# Patient Record
Sex: MALE | Race: WHITE | NOT HISPANIC OR LATINO | Employment: OTHER | URBAN - METROPOLITAN AREA
[De-identification: names, ages, dates, MRNs, and addresses within clinical notes are randomized per-mention and may not be internally consistent; named-entity substitution may affect disease eponyms.]

---

## 2018-09-20 ENCOUNTER — OFFICE VISIT (OUTPATIENT)
Dept: ORTHOPEDICS | Facility: CLINIC | Age: 63
End: 2018-09-20
Payer: COMMERCIAL

## 2018-09-20 VITALS
DIASTOLIC BLOOD PRESSURE: 100 MMHG | BODY MASS INDEX: 30.22 KG/M2 | WEIGHT: 228 LBS | HEIGHT: 73 IN | SYSTOLIC BLOOD PRESSURE: 160 MMHG

## 2018-09-20 DIAGNOSIS — M16.11 PRIMARY OSTEOARTHRITIS OF RIGHT HIP: ICD-10-CM

## 2018-09-20 DIAGNOSIS — M16.12 PRIMARY OSTEOARTHRITIS OF LEFT HIP: ICD-10-CM

## 2018-09-20 DIAGNOSIS — M54.16 LUMBAR RADICULITIS: Primary | ICD-10-CM

## 2018-09-20 PROCEDURE — 3008F BODY MASS INDEX DOCD: CPT | Mod: ,,, | Performed by: ORTHOPAEDIC SURGERY

## 2018-09-20 PROCEDURE — 72100 X-RAY EXAM L-S SPINE 2/3 VWS: CPT | Mod: ,,, | Performed by: ORTHOPAEDIC SURGERY

## 2018-09-20 PROCEDURE — 99203 OFFICE O/P NEW LOW 30 MIN: CPT | Mod: 57,25,, | Performed by: ORTHOPAEDIC SURGERY

## 2018-09-20 PROCEDURE — 72170 X-RAY EXAM OF PELVIS: CPT | Mod: ,,, | Performed by: ORTHOPAEDIC SURGERY

## 2018-09-20 RX ORDER — FUROSEMIDE 20 MG/1
20 TABLET ORAL 2 TIMES DAILY PRN
COMMUNITY
End: 2021-01-11 | Stop reason: SDUPTHER

## 2018-09-20 RX ORDER — METOPROLOL SUCCINATE 25 MG/1
50 TABLET, EXTENDED RELEASE ORAL DAILY
COMMUNITY
End: 2020-06-24

## 2018-09-20 NOTE — PROGRESS NOTES
[facility]    Subjective:     Chief Complaint:   Chief Complaint   Patient presents with    Lumbar Spine - Pain     Lumbar pain x 2 years radiates into both hips/ groin. He has left foot numbness. Limited sitting, standing and walking. NO bowel or bladder problems       Past Medical History:   Diagnosis Date    A-fib     Hypertension        Past Surgical History:   Procedure Laterality Date    CARDIAC SURGERY         Current Outpatient Medications   Medication Sig    apixaban (ELIQUIS) 5 mg (74 tabs) DsPk Take by mouth. For the first 7 days take two 5 mg tablets twice daily.  After 7 days take one 5 mg tablet twice daily.    furosemide (LASIX) 20 MG tablet Take 20 mg by mouth 2 (two) times daily.    losartan potassium (LOSARTAN ORAL) Take by mouth.    metoprolol succinate (TOPROL-XL) 25 MG 24 hr tablet Take 25 mg by mouth once daily.    spironolactone (CAROSPIR) 25 mg/5 mL Susp oral susp Take 37.5 mg by mouth once daily.     No current facility-administered medications for this visit.        Review of patient's allergies indicates:   Allergen Reactions    Lisinopril        History reviewed. No pertinent family history.    Social History     Socioeconomic History    Marital status:      Spouse name: Not on file    Number of children: Not on file    Years of education: Not on file    Highest education level: Not on file   Social Needs    Financial resource strain: Not on file    Food insecurity - worry: Not on file    Food insecurity - inability: Not on file    Transportation needs - medical: Not on file    Transportation needs - non-medical: Not on file   Occupational History    Not on file   Tobacco Use    Smoking status: Current Every Day Smoker   Substance and Sexual Activity    Alcohol use: Not on file    Drug use: Not on file    Sexual activity: Not on file   Other Topics Concern    Not on file   Social History Narrative    Not on file       History of present  illness: Patient comes in today for chief complaint of bilateral severe groin pain. The right is much worse than the left. He also has low back pain. This been going on for many years. He's got poor is having difficulty ambulating.      Review of Systems:    Constitution: Negative for chills, fever, and sweats.  Negative for unexplained weight loss.    HENT:  Negative for headaches and blurry vision.    Cardiovascular:Negative for chest pain or irregular heart beat. Negative for hypertension.    Respiratory:  Negative for cough and shortness of breath.    Gastrointestinal: Negative for abdominal pain, heartburn, melena, nausea, and vomitting.    Genitourinary:  Negative bladder incontinence and dysuria.    Musculoskeletal:  See HPI for details.     Neurological: Negative for numbness.    Psychiatric/Behavioral: Negative for depression.  The patient is not nervous/anxious.      Endocrine: Negative for polyuria    Hematologic/Lymphatic: Negative for bleeding problem.  Does not bruise/bleed easily.    Skin: Negative for poor would healing and rash    Objective:      Physical Examination:    Vital Signs:    Vitals:    09/20/18 1629   BP: (!) 160/100       Body mass index is 30.08 kg/m².    This a well-developed, well nourished patient in no acute distress.  They are alert and oriented and cooperative to examination.        Patient has full range of motion of his lumbar spine. His pain with flexion extension of the lumbar spine. Negative straight leg raises. EHLs are intact deep tendon reflexes are intact. He has a 10° hip flexion contracture on the right. He has severe pain with internal/external rotation of the right hip. His pain with external and internal rotation of the left hip. No flexion contracture on the left side.  Pertinent New Results:    XRAY Report / Interpretation:   AP of the pelvis demonstrates bone-on-bone osteophyte is of the right hip and severe osteophyte arthritis of the left hip.    And lateral of  the lumbar spine demonstrates moderate general changes of the lower lumbar segments.    Assessment/Plan:      Severe bone-on-bone osteophyte arthritis of the right hip. I've offered him a right total hip arthroplasty. Risks and benefits including dislocation leg length discrepancy RSD DVT another complications are discussed with him in great detail. He understood and wished to proceed      This note was created using Dragon voice recognition software that occasionally misinterpreted phrases or words.

## 2018-09-24 ENCOUNTER — TELEPHONE (OUTPATIENT)
Dept: ORTHOPEDICS | Facility: CLINIC | Age: 63
End: 2018-09-24

## 2018-09-24 NOTE — TELEPHONE ENCOUNTER
----- Message from Joan Gatica sent at 9/24/2018  3:58 PM CDT -----  Contact: PATIENT  PLEASE CALL ABOUT PATIENT ABOUT THE PROCEDURE DR CHÁVEZ TOLD HIM ABOUT

## 2018-09-24 NOTE — TELEPHONE ENCOUNTER
Pt would like to see about having an anterior LILIBETH. Will speak with Dr. Perdue and get back with him

## 2018-09-26 NOTE — TELEPHONE ENCOUNTER
Spoke with Dr. Perdue and he does not do an anterior approach but offered for the pt to come back in and see him. Adrianne't made

## 2018-10-02 ENCOUNTER — OFFICE VISIT (OUTPATIENT)
Dept: ORTHOPEDICS | Facility: CLINIC | Age: 63
End: 2018-10-02
Payer: COMMERCIAL

## 2018-10-02 VITALS
DIASTOLIC BLOOD PRESSURE: 98 MMHG | HEART RATE: 75 BPM | WEIGHT: 228 LBS | SYSTOLIC BLOOD PRESSURE: 148 MMHG | HEIGHT: 73 IN | BODY MASS INDEX: 30.22 KG/M2

## 2018-10-02 DIAGNOSIS — M16.11 PRIMARY OSTEOARTHRITIS OF RIGHT HIP: Primary | ICD-10-CM

## 2018-10-02 PROCEDURE — 3008F BODY MASS INDEX DOCD: CPT | Mod: ,,, | Performed by: ORTHOPAEDIC SURGERY

## 2018-10-02 PROCEDURE — 99499 UNLISTED E&M SERVICE: CPT | Mod: ,,, | Performed by: ORTHOPAEDIC SURGERY

## 2018-10-02 NOTE — PROGRESS NOTES
[facility]    Subjective:     Chief Complaint:   Chief Complaint   Patient presents with    Right Hip - Pain     Right hip pain x years. States that he would like to talk about setting up surgery today for the right hip.        Past Medical History:   Diagnosis Date    A-fib     Hypertension        Past Surgical History:   Procedure Laterality Date    CARDIAC SURGERY      KNEE SURGERY         Current Outpatient Medications   Medication Sig    apixaban (ELIQUIS) 5 mg (74 tabs) DsPk Take by mouth. For the first 7 days take two 5 mg tablets twice daily.  After 7 days take one 5 mg tablet twice daily.    furosemide (LASIX) 20 MG tablet Take 20 mg by mouth 2 (two) times daily.    losartan potassium (LOSARTAN ORAL) Take by mouth.    metoprolol succinate (TOPROL-XL) 25 MG 24 hr tablet Take 25 mg by mouth once daily.    spironolactone (CAROSPIR) 25 mg/5 mL Susp oral susp Take 37.5 mg by mouth once daily.     No current facility-administered medications for this visit.        Review of patient's allergies indicates:   Allergen Reactions    Lisinopril        History reviewed. No pertinent family history.    Social History     Socioeconomic History    Marital status:      Spouse name: Not on file    Number of children: Not on file    Years of education: Not on file    Highest education level: Not on file   Social Needs    Financial resource strain: Not on file    Food insecurity - worry: Not on file    Food insecurity - inability: Not on file    Transportation needs - medical: Not on file    Transportation needs - non-medical: Not on file   Occupational History    Not on file   Tobacco Use    Smoking status: Current Every Day Smoker   Substance and Sexual Activity    Alcohol use: Not on file    Drug use: Not on file    Sexual activity: Not on file   Other Topics Concern    Not on file   Social History Narrative    Not on file       History of present illness: Patient comes in today  with a chief complaint of right hip pain. He really comes in today for his upcoming total hip.      Review of Systems:    Constitution: Negative for chills, fever, and sweats.  Negative for unexplained weight loss.    HENT:  Negative for headaches and blurry vision.    Cardiovascular:Negative for chest pain or irregular heart beat. Negative for hypertension.    Respiratory:  Negative for cough and shortness of breath.    Gastrointestinal: Negative for abdominal pain, heartburn, melena, nausea, and vomitting.    Genitourinary:  Negative bladder incontinence and dysuria.    Musculoskeletal:  See HPI for details.     Neurological: Negative for numbness.    Psychiatric/Behavioral: Negative for depression.  The patient is not nervous/anxious.      Endocrine: Negative for polyuria    Hematologic/Lymphatic: Negative for bleeding problem.  Does not bruise/bleed easily.    Skin: Negative for poor would healing and rash    Objective:      Physical Examination:    Vital Signs:    Vitals:    10/02/18 1619   BP: (!) 148/98   Pulse: 75       Body mass index is 30.08 kg/m².    This a well-developed, well nourished patient in no acute distress.  They are alert and oriented and cooperative to examination.        Patient has limited range of motion of his right hip. No change in his exam from prior visit.  Pertinent New Results:    XRAY Report / Interpretation:   No new XRAYS Today.    Assessment/Plan:      Right hip bone-on-bone osteophyte arthritis. I answered all his questions. We spoke about the procedure in extensive detail. He will follow-up for the surgery      This note was created using Dragon voice recognition software that occasionally misinterpreted phrases or words.

## 2018-10-05 ENCOUNTER — OFFICE VISIT (OUTPATIENT)
Dept: ORTHOPEDICS | Facility: CLINIC | Age: 63
End: 2018-10-05
Payer: COMMERCIAL

## 2018-10-05 VITALS — HEIGHT: 73 IN | WEIGHT: 228 LBS | BODY MASS INDEX: 30.22 KG/M2

## 2018-10-05 DIAGNOSIS — M16.11 PRIMARY OSTEOARTHRITIS OF RIGHT HIP: Primary | ICD-10-CM

## 2018-10-05 PROCEDURE — 99213 OFFICE O/P EST LOW 20 MIN: CPT | Mod: ,,, | Performed by: ORTHOPAEDIC SURGERY

## 2018-10-05 PROCEDURE — 3008F BODY MASS INDEX DOCD: CPT | Mod: ,,, | Performed by: ORTHOPAEDIC SURGERY

## 2018-10-05 PROCEDURE — 73502 X-RAY EXAM HIP UNI 2-3 VIEWS: CPT | Mod: FY,RT,, | Performed by: ORTHOPAEDIC SURGERY

## 2018-10-05 NOTE — PROGRESS NOTES
Subjective:       Chief Complaint    Chief Complaint   Patient presents with    Hip Pain     Pt states that he has had hip pain x 2-3 years. He states that over time it is getting progressively worse. He describes his pain as a constant heavy aching pain. He states that his pain depends on his activity. He takes Tylenol for pain with some relief.       THOMAS Andrea is a 63 y.o.  male who presents painful right hip. He is requesting anterior hip surgery.      Past History  Past Medical History:   Diagnosis Date    A-fib     Hypertension      Past Surgical History:   Procedure Laterality Date    CARDIAC SURGERY      KNEE SURGERY       Social History     Socioeconomic History    Marital status:      Spouse name: Not on file    Number of children: Not on file    Years of education: Not on file    Highest education level: Not on file   Social Needs    Financial resource strain: Not on file    Food insecurity - worry: Not on file    Food insecurity - inability: Not on file    Transportation needs - medical: Not on file    Transportation needs - non-medical: Not on file   Occupational History    Not on file   Tobacco Use    Smoking status: Current Every Day Smoker   Substance and Sexual Activity    Alcohol use: Not on file    Drug use: Not on file    Sexual activity: Not on file   Other Topics Concern    Not on file   Social History Narrative    Not on file         Medications  Current Outpatient Medications   Medication Sig    apixaban (ELIQUIS) 5 mg (74 tabs) DsPk Take by mouth. For the first 7 days take two 5 mg tablets twice daily.  After 7 days take one 5 mg tablet twice daily.    furosemide (LASIX) 20 MG tablet Take 20 mg by mouth 2 (two) times daily.    losartan potassium (LOSARTAN ORAL) Take by mouth.    metoprolol succinate (TOPROL-XL) 25 MG 24 hr tablet Take 25 mg by mouth once daily.    spironolactone (CAROSPIR) 25 mg/5 mL Susp oral susp Take 37.5 mg by mouth once  daily.     No current facility-administered medications for this visit.        Allergies  Review of patient's allergies indicates:   Allergen Reactions    Lisinopril          Review of Systems     Constitutional: Negative    HENT: Negative  Eyes: Negative  Respiratory: Negative  Cardiovascular: Negative  Musculoskeletal: HPI  Skin: Negative  Neurological: Negative  Hematological: Negative  Endocrine: Negative      Physical Exam    There were no vitals filed for this visit.  Physical Examination: Well-developed, well-nourished white male, alert, cooperative. Tender over the anterior aspect of the right hip. Abduction, 30° with pain, flexion 90° with pain. There is total of 30°, rotation with pain. Is an external rotation contracture present 20°.     Skin-clear  General appearance -  well appearing, and in no distress  Mental status - awake  Neck - supple  Chest -  symmetric air entry  Heart - normal rate   Abdomen - soft      Assessment/Plan   Primary osteoarthritis of right hip    He is scheduled for October 22 on Monday. His preop October 17. He is currently on eliquis, which will have to be stopped preop day      This note was dictated using voice recognition software and may contain grammatical errors.

## 2018-10-08 DIAGNOSIS — M16.11 OSTEOARTHRITIS OF RIGHT HIP: Primary | ICD-10-CM

## 2018-10-11 ENCOUNTER — TELEPHONE (OUTPATIENT)
Dept: ORTHOPEDICS | Facility: CLINIC | Age: 63
End: 2018-10-11

## 2018-10-11 NOTE — TELEPHONE ENCOUNTER
----- Message from Erika Velasquez sent at 10/11/2018 10:07 AM CDT -----  Please does not want to schedule hip surgery with Dr. Perdue.  He is going to a different doctor.  Call him if you have any questions.

## 2018-10-17 ENCOUNTER — OFFICE VISIT (OUTPATIENT)
Dept: ORTHOPEDICS | Facility: CLINIC | Age: 63
End: 2018-10-17
Payer: COMMERCIAL

## 2018-10-17 VITALS
DIASTOLIC BLOOD PRESSURE: 84 MMHG | HEIGHT: 73 IN | SYSTOLIC BLOOD PRESSURE: 136 MMHG | WEIGHT: 228 LBS | BODY MASS INDEX: 30.22 KG/M2 | HEART RATE: 89 BPM

## 2018-10-17 DIAGNOSIS — M16.11 PRIMARY OSTEOARTHRITIS OF RIGHT HIP: Primary | ICD-10-CM

## 2018-10-17 LAB
ALBUMIN SERPL-MCNC: 4.2 G/DL (ref 3.1–4.7)
ALP SERPL-CCNC: 75 IU/L (ref 40–104)
ALT (SGPT): 19 IU/L (ref 3–33)
AST SERPL-CCNC: 24 IU/L (ref 10–40)
BILIRUB SERPL-MCNC: 0.7 MG/DL (ref 0.3–1)
BUN SERPL-MCNC: 13 MG/DL (ref 8–20)
CALCIUM SERPL-MCNC: 8.9 MG/DL (ref 7.7–10.4)
CHLORIDE: 105 MMOL/L (ref 98–110)
CO2 SERPL-SCNC: 27.2 MMOL/L (ref 22.8–31.6)
CREATININE: 1.06 MG/DL (ref 0.6–1.4)
GLUCOSE: 98 MG/DL (ref 70–99)
HCT VFR BLD AUTO: 44.3 % (ref 39–55)
HGB BLD-MCNC: 15 G/DL (ref 14–16)
MCH RBC QN AUTO: 32.3 PG (ref 25–35)
MCHC RBC AUTO-ENTMCNC: 33.9 G/DL (ref 31–36)
MCV RBC AUTO: 95.3 FL (ref 80–100)
MRSA SCREEN BY PCR: NORMAL
NUCLEATED RBCS: 0 %
PLATELET # BLD AUTO: 151 K/UL (ref 140–440)
POTASSIUM SERPL-SCNC: 4.2 MMOL/L (ref 3.5–5)
PROT SERPL-MCNC: 7.1 G/DL (ref 6–8.2)
RBC # BLD AUTO: 4.65 M/UL (ref 4.3–5.9)
SODIUM: 138 MMOL/L (ref 134–144)
WBC # BLD AUTO: 8.5 K/UL (ref 5–10)

## 2018-10-17 PROCEDURE — 99499 UNLISTED E&M SERVICE: CPT | Mod: ,,, | Performed by: ORTHOPAEDIC SURGERY

## 2018-10-17 NOTE — PROGRESS NOTES
Subjective:       Chief Complaint    Chief Complaint   Patient presents with    Pre-op Exam     Patient is scheduled for a right total hip arthroplasty on 10/22/18 w/ Dr. Cosme.  Chronic hip pain since approx. 2015.  Stopped Eliquis as of today.  Dr. Oliver advised him to start Lovenox in place of the Eliquis.       THOMAS Andrea is a 63 y.o.  male who presents preop total hip arthroplasty on the right. He has stopped his eliquis. He does have a history of a mini stroke and has atrial fibrillation.      Past History  Past Medical History:   Diagnosis Date    A-fib     A-fib     Hypertension      Past Surgical History:   Procedure Laterality Date    CARDIAC SURGERY      Correct abnormal heart rhythm    KNEE SURGERY       Social History     Socioeconomic History    Marital status:      Spouse name: Not on file    Number of children: Not on file    Years of education: Not on file    Highest education level: Not on file   Social Needs    Financial resource strain: Not on file    Food insecurity - worry: Not on file    Food insecurity - inability: Not on file    Transportation needs - medical: Not on file    Transportation needs - non-medical: Not on file   Occupational History    Not on file   Tobacco Use    Smoking status: Current Every Day Smoker    Smokeless tobacco: Never Used   Substance and Sexual Activity    Alcohol use: Not on file    Drug use: Not on file    Sexual activity: Not on file   Other Topics Concern    Not on file   Social History Narrative    Not on file         Medications  Current Outpatient Medications   Medication Sig    furosemide (LASIX) 20 MG tablet Take 20 mg by mouth 2 (two) times daily.    losartan potassium (LOSARTAN ORAL) Take by mouth.    metoprolol succinate (TOPROL-XL) 25 MG 24 hr tablet Take 25 mg by mouth 2 (two) times daily.     SPIRONOLACTONE ORAL Take 37.5 mg by mouth once daily.    apixaban (ELIQUIS) 5 mg (74 tabs) DsPk Take by  mouth. For the first 7 days take two 5 mg tablets twice daily.  After 7 days take one 5 mg tablet twice daily.     No current facility-administered medications for this visit.        Allergies  Review of patient's allergies indicates:   Allergen Reactions    Lisinopril          Review of Systems     Constitutional: Negative    HENT: Negative  Eyes: Negative  Respiratory: Negative  Cardiovascular: Negative  Musculoskeletal: HPI  Skin: Negative  Neurological: Negative  Hematological: Negative  Endocrine: Negative      Physical Exam    Vitals:    10/17/18 0852   BP: 136/84   Pulse: 89     Physical Examination: Right hip examination reveals tenderness over the front of the right hip. Abduction, 30° flexion 90°, there is a 30° external rotation contracture with no internal rotation. Neurovascular intact. Some venous insufficiency changes noted in the around the ankle and leg area     Skin-clear  General appearance -  well appearing, and in no distress  Mental status - awake  Neck - supple  Chest -  symmetric air entry  Heart - normal rate   Abdomen - soft      Assessment/Plan   Primary osteoarthritis of right hip      All questions answered. Patient is scheduled for October 22. He will undergo anterior approach total hip arthroplasty. His postoperative pain pill of choice is Percocet.      This note was dictated using voice recognition software and may contain grammatical errors.

## 2018-10-25 ENCOUNTER — TELEPHONE (OUTPATIENT)
Dept: ORTHOPEDICS | Facility: CLINIC | Age: 63
End: 2018-10-25

## 2018-10-25 NOTE — TELEPHONE ENCOUNTER
Spoke to Romelia with [facility] @ [phone], new auth created and approved. Auth # [id number] for DOS: 10/29/2018  Notified Lisa with [facility] insurance.

## 2018-10-26 DIAGNOSIS — M16.11 PRIMARY OSTEOARTHRITIS OF RIGHT HIP: Primary | ICD-10-CM

## 2018-10-26 DIAGNOSIS — Z01.818 PRE-OP TESTING: ICD-10-CM

## 2018-10-26 LAB
BASOPHILS NFR BLD: 0.1 K/UL (ref 0–0.2)
BASOPHILS NFR BLD: 0.8 %
EOSINOPHIL NFR BLD: 0.3 K/UL (ref 0–0.7)
EOSINOPHIL NFR BLD: 5.3 %
ERYTHROCYTE [DISTWIDTH] IN BLOOD BY AUTOMATED COUNT: 12.3 % (ref 11.7–14.9)
GRAN #: 3.2 K/UL (ref 1.4–6.5)
GRAN%: 53.6 %
HCT VFR BLD AUTO: 46.1 % (ref 39–55)
HGB BLD-MCNC: 15.5 G/DL (ref 14–16)
IMMATURE GRANS (ABS): 0 K/UL (ref 0–1)
IMMATURE GRANULOCYTES: 0.5 %
LYMPH #: 1.7 K/UL (ref 1.2–3.4)
LYMPH%: 28.8 %
MCH RBC QN AUTO: 32.4 PG (ref 25–35)
MCHC RBC AUTO-ENTMCNC: 33.6 G/DL (ref 31–36)
MCV RBC AUTO: 96.4 FL (ref 80–100)
MONO #: 0.7 K/UL (ref 0.1–0.6)
MONO%: 11 %
NUCLEATED RBCS: 0 %
PLATELET # BLD AUTO: 126 K/UL (ref 140–440)
PMV BLD AUTO: 12 FL (ref 8.8–12.7)
RBC # BLD AUTO: 4.78 M/UL (ref 4.3–5.9)
WBC # BLD AUTO: 6 K/UL (ref 5–10)

## 2018-10-29 LAB
HCT VFR BLD AUTO: 28.4 % (ref 39–55)
HGB BLD-MCNC: 9.7 G/DL (ref 14–16)

## 2018-10-30 LAB
HCT VFR BLD AUTO: 25.9 % (ref 39–55)
HGB BLD-MCNC: 8.7 G/DL (ref 14–16)
MCH RBC QN AUTO: 32.8 PG (ref 25–35)
MCHC RBC AUTO-ENTMCNC: 33.6 G/DL (ref 31–36)
MCV RBC AUTO: 97.7 FL (ref 80–100)
NUCLEATED RBCS: 0 %
PLATELET # BLD AUTO: 124 K/UL (ref 140–440)
RBC # BLD AUTO: 2.65 M/UL (ref 4.3–5.9)
WBC # BLD AUTO: 10.5 K/UL (ref 5–10)

## 2018-11-02 DIAGNOSIS — Z96.641 STATUS POST TOTAL HIP REPLACEMENT, RIGHT: Primary | ICD-10-CM

## 2018-11-02 DIAGNOSIS — M16.11 PRIMARY OSTEOARTHRITIS OF RIGHT HIP: ICD-10-CM

## 2018-11-02 RX ORDER — OXYCODONE AND ACETAMINOPHEN 7.5; 325 MG/1; MG/1
1 TABLET ORAL EVERY 6 HOURS PRN
Qty: 40 TABLET | Refills: 0 | Status: SHIPPED | OUTPATIENT
Start: 2018-11-02 | End: 2018-12-13

## 2018-11-02 NOTE — TELEPHONE ENCOUNTER
Patient was discharged without a RX for pain medication. Home health nurse Viviane states he is unable to take Norco due to headaches so a RX for Percocet is being given. Patient notified to have someone pick it up from the office for him.

## 2018-11-20 ENCOUNTER — OFFICE VISIT (OUTPATIENT)
Dept: ORTHOPEDICS | Facility: CLINIC | Age: 63
End: 2018-11-20
Payer: COMMERCIAL

## 2018-11-20 VITALS — BODY MASS INDEX: 30.22 KG/M2 | WEIGHT: 228 LBS | HEIGHT: 73 IN

## 2018-11-20 DIAGNOSIS — Z98.890 POSTOPERATIVE STATE: ICD-10-CM

## 2018-11-20 DIAGNOSIS — M16.11 PRIMARY OSTEOARTHRITIS OF RIGHT HIP: Primary | ICD-10-CM

## 2018-11-20 DIAGNOSIS — Z96.641 HISTORY OF TOTAL RIGHT HIP REPLACEMENT: ICD-10-CM

## 2018-11-20 PROCEDURE — 99024 POSTOP FOLLOW-UP VISIT: CPT | Mod: ,,, | Performed by: ORTHOPAEDIC SURGERY

## 2018-11-26 DIAGNOSIS — M16.11 PRIMARY OSTEOARTHRITIS OF RIGHT HIP: Primary | ICD-10-CM

## 2018-11-26 DIAGNOSIS — Z98.890 POSTOPERATIVE STATE: ICD-10-CM

## 2018-11-26 DIAGNOSIS — Z96.641 HISTORY OF TOTAL RIGHT HIP REPLACEMENT: ICD-10-CM

## 2018-12-06 ENCOUNTER — TELEPHONE (OUTPATIENT)
Dept: ORTHOPEDICS | Facility: CLINIC | Age: 63
End: 2018-12-06

## 2018-12-06 NOTE — TELEPHONE ENCOUNTER
Informed patient that Dr. Cosme is no longer with the [facility].  Advised him/her that Rhonda Gabriel, [facility], and [facility] have agreed to assume care of Dr. Cosme's patients.  He is wanting to be released to return to work before 12/17/18.  He will call to make an appointment with Dr. Perdue since he saw him back in October.  The patient had his surgery w/ Dr. Cosme because Dr. Perdue did not do the anterior approach the patient wanted done.    Return to work form will be scanned into the patient's chart for [facility] to access.

## 2018-12-13 ENCOUNTER — OFFICE VISIT (OUTPATIENT)
Dept: ORTHOPEDICS | Facility: CLINIC | Age: 63
End: 2018-12-13
Payer: COMMERCIAL

## 2018-12-13 VITALS
SYSTOLIC BLOOD PRESSURE: 128 MMHG | HEIGHT: 73 IN | BODY MASS INDEX: 30.22 KG/M2 | HEART RATE: 70 BPM | DIASTOLIC BLOOD PRESSURE: 80 MMHG | WEIGHT: 228 LBS

## 2018-12-13 DIAGNOSIS — Z96.641 STATUS POST TOTAL HIP REPLACEMENT, RIGHT: Primary | ICD-10-CM

## 2018-12-13 PROCEDURE — 99024 POSTOP FOLLOW-UP VISIT: CPT | Mod: ,,, | Performed by: ORTHOPAEDIC SURGERY

## 2018-12-13 PROCEDURE — 73501 X-RAY EXAM HIP UNI 1 VIEW: CPT | Mod: RT,,, | Performed by: ORTHOPAEDIC SURGERY

## 2018-12-13 NOTE — PROGRESS NOTES
[facility]    Subjective:     Chief Complaint:   Chief Complaint   Patient presents with    Right Hip - Post-op Evaluation     P/O RT LILIBETH 10/29/18 by DR PARSONS. States overall he is doing well, is in outpatient PT. States that he is here simply for a work release saying he can go back to work. He brought paperwork.        Past Medical History:   Diagnosis Date    A-fib     Hypertension        Past Surgical History:   Procedure Laterality Date    CARDIAC SURGERY      Correct abnormal heart rhythm    KNEE SURGERY      TOTAL HIP ARTHROPLASTY Right 10/29/2018    anterior approach (depuy hardware)    TOTAL HIP ARTHROPLASTY Right        Current Outpatient Medications   Medication Sig    apixaban (ELIQUIS) 5 mg (74 tabs) DsPk Take by mouth. For the first 7 days take two 5 mg tablets twice daily.  After 7 days take one 5 mg tablet twice daily.    furosemide (LASIX) 20 MG tablet Take 20 mg by mouth 2 (two) times daily as needed.     losartan potassium (LOSARTAN ORAL) Take by mouth.    metoprolol succinate (TOPROL-XL) 25 MG 24 hr tablet Take 25 mg by mouth 2 (two) times daily.     SPIRONOLACTONE ORAL Take 37.5 mg by mouth once daily.     No current facility-administered medications for this visit.        Review of patient's allergies indicates:   Allergen Reactions    Lisinopril        History reviewed. No pertinent family history.    Social History     Socioeconomic History    Marital status:      Spouse name: Not on file    Number of children: Not on file    Years of education: Not on file    Highest education level: Not on file   Social Needs    Financial resource strain: Not on file    Food insecurity - worry: Not on file    Food insecurity - inability: Not on file    Transportation needs - medical: Not on file    Transportation needs - non-medical: Not on file   Occupational History    Not on file   Tobacco Use    Smoking status: Current Every Day Smoker    Smokeless tobacco:  Never Used   Substance and Sexual Activity    Alcohol use: Not on file    Drug use: Not on file    Sexual activity: Not on file   Other Topics Concern    Not on file   Social History Narrative    Not on file       History of present illness: Patient returns for follow-up for his right total hip. His right total hip was done by Dr. Cosme. He is here for routine follow-up.      Review of Systems:    Constitution: Negative for chills, fever, and sweats.  Negative for unexplained weight loss.    HENT:  Negative for headaches and blurry vision.    Cardiovascular:Negative for chest pain or irregular heart beat. Negative for hypertension.    Respiratory:  Negative for cough and shortness of breath.    Gastrointestinal: Negative for abdominal pain, heartburn, melena, nausea, and vomitting.    Genitourinary:  Negative bladder incontinence and dysuria.    Musculoskeletal:  See HPI for details.     Neurological: Negative for numbness.    Psychiatric/Behavioral: Negative for depression.  The patient is not nervous/anxious.      Endocrine: Negative for polyuria    Hematologic/Lymphatic: Negative for bleeding problem.  Does not bruise/bleed easily.    Skin: Negative for poor would healing and rash    Objective:      Physical Examination:    Vital Signs:    Vitals:    12/13/18 1510   BP: 128/80   Pulse: 70       Body mass index is 30.08 kg/m².    This a well-developed, well nourished patient in no acute distress.  They are alert and oriented and cooperative to examination.        Wounds are clean dry and intact. Patient has little or no discomfort with motion of his hip. Some pain with flexion.  Pertinent New Results:    XRAY Report / Interpretation:   AP of the right hip demonstrates a right total hip arthroplasty be in ideal position    Assessment/Plan:      Stable following right total hip. No further intervention. Follow-up when necessary      This note was created using Dragon voice recognition software that  occasionally misinterpreted phrases or words.

## 2019-02-01 ENCOUNTER — OFFICE VISIT (OUTPATIENT)
Dept: PRIMARY CARE CLINIC | Facility: CLINIC | Age: 64
End: 2019-02-01
Payer: COMMERCIAL

## 2019-02-01 VITALS
DIASTOLIC BLOOD PRESSURE: 83 MMHG | SYSTOLIC BLOOD PRESSURE: 145 MMHG | OXYGEN SATURATION: 97 % | HEIGHT: 73 IN | RESPIRATION RATE: 20 BRPM | HEART RATE: 82 BPM | TEMPERATURE: 99 F | WEIGHT: 232.56 LBS | BODY MASS INDEX: 30.82 KG/M2

## 2019-02-01 DIAGNOSIS — I50.9 CONGESTIVE HEART FAILURE, UNSPECIFIED HF CHRONICITY, UNSPECIFIED HEART FAILURE TYPE: ICD-10-CM

## 2019-02-01 DIAGNOSIS — I48.0 PAROXYSMAL ATRIAL FIBRILLATION: ICD-10-CM

## 2019-02-01 DIAGNOSIS — Z79.01 CURRENT USE OF LONG TERM ANTICOAGULATION: ICD-10-CM

## 2019-02-01 DIAGNOSIS — R05.3 CHRONIC COUGH: ICD-10-CM

## 2019-02-01 DIAGNOSIS — R60.0 BILATERAL LOWER EXTREMITY EDEMA: ICD-10-CM

## 2019-02-01 DIAGNOSIS — F17.200 CURRENT EVERY DAY SMOKER: ICD-10-CM

## 2019-02-01 DIAGNOSIS — Z12.11 COLON CANCER SCREENING: ICD-10-CM

## 2019-02-01 PROCEDURE — 99999 PR PBB SHADOW E&M-NEW PATIENT-LVL IV: ICD-10-PCS | Mod: PBBFAC,,, | Performed by: NURSE PRACTITIONER

## 2019-02-01 PROCEDURE — 3008F PR BODY MASS INDEX (BMI) DOCUMENTED: ICD-10-PCS | Mod: CPTII,S$GLB,, | Performed by: NURSE PRACTITIONER

## 2019-02-01 PROCEDURE — 99214 OFFICE O/P EST MOD 30 MIN: CPT | Mod: S$GLB,,, | Performed by: NURSE PRACTITIONER

## 2019-02-01 PROCEDURE — 3008F BODY MASS INDEX DOCD: CPT | Mod: CPTII,S$GLB,, | Performed by: NURSE PRACTITIONER

## 2019-02-01 PROCEDURE — 99214 PR OFFICE/OUTPT VISIT, EST, LEVL IV, 30-39 MIN: ICD-10-PCS | Mod: S$GLB,,, | Performed by: NURSE PRACTITIONER

## 2019-02-01 PROCEDURE — 99999 PR PBB SHADOW E&M-NEW PATIENT-LVL IV: CPT | Mod: PBBFAC,,, | Performed by: NURSE PRACTITIONER

## 2019-02-01 RX ORDER — APIXABAN 5 MG/1
TABLET, FILM COATED ORAL
COMMUNITY
Start: 2019-01-23 | End: 2019-02-01 | Stop reason: SDUPTHER

## 2019-02-01 RX ORDER — LEVALBUTEROL TARTRATE 45 UG/1
1-2 AEROSOL, METERED ORAL EVERY 6 HOURS PRN
Qty: 15 G | Refills: 0 | Status: SHIPPED | OUTPATIENT
Start: 2019-02-01 | End: 2019-10-09

## 2019-02-01 RX ORDER — BENZONATATE 200 MG/1
200 CAPSULE ORAL 3 TIMES DAILY PRN
Qty: 30 CAPSULE | Refills: 0 | Status: SHIPPED | OUTPATIENT
Start: 2019-02-01 | End: 2019-02-11

## 2019-02-01 RX ORDER — METOPROLOL SUCCINATE 50 MG/1
TABLET, EXTENDED RELEASE ORAL
COMMUNITY
Start: 2019-01-07 | End: 2019-02-01 | Stop reason: SDUPTHER

## 2019-02-01 RX ORDER — VARENICLINE TARTRATE 0.5 (11)-1
KIT ORAL
Qty: 1 PACKAGE | Refills: 0 | Status: SHIPPED | OUTPATIENT
Start: 2019-02-01 | End: 2019-10-09

## 2019-02-01 RX ORDER — LOSARTAN POTASSIUM 25 MG/1
25 TABLET ORAL DAILY
COMMUNITY
Start: 2019-01-24 | End: 2021-11-19 | Stop reason: SDUPTHER

## 2019-02-01 RX ORDER — SPIRONOLACTONE 25 MG/1
TABLET ORAL
COMMUNITY
Start: 2019-01-07 | End: 2019-02-01 | Stop reason: SDUPTHER

## 2019-02-01 NOTE — PROGRESS NOTES
Subjective:       Patient ID: Gregorio Guerra is a 63 y.o. male.    Chief Complaint: Cough    Cough   This is a chronic problem. The current episode started more than 1 month ago. The problem has been waxing and waning. The problem occurs every few minutes. The cough is productive of sputum (is productive at times and non productive at other times ). Associated symptoms include wheezing (intertmittent. ). Pertinent negatives include no chest pain, chills, ear congestion, ear pain, fever, headaches, heartburn, hemoptysis, myalgias, nasal congestion, postnasal drip, rash, rhinorrhea, sore throat, shortness of breath, sweats or weight loss. Associated symptoms comments: Patient reports increased lower extremity edema.  He started noticing it 2 weeks ago with his socks making marks on his legs.  He restarted taking his Lasix routinely instead of as needed.  He does not weight him self daily.  He reports his edema is much improved.  . The symptoms are aggravated by lying down. Risk factors for lung disease include smoking/tobacco exposure. Treatments tried: used a friends nebulizer  The treatment provided mild relief. His past medical history is significant for pneumonia (states had pneumonia 3 months ago after right hip replacement ). unknown class of heart failure - followd by Dr. Pa in [facility].  Also has h/o Afib is s/p cardioversion      Review of Systems   Constitutional: Negative for chills, fatigue, fever and weight loss.   HENT: Negative for congestion, ear pain, postnasal drip, rhinorrhea, sinus pressure, sinus pain and sore throat.    Eyes: Negative.    Respiratory: Positive for cough and wheezing (intertmittent. ). Negative for hemoptysis, chest tightness and shortness of breath.    Cardiovascular: Positive for leg swelling. Negative for chest pain and palpitations.   Gastrointestinal: Negative.  Negative for heartburn.   Genitourinary: Negative.    Musculoskeletal: Negative.  Negative for  myalgias.   Skin: Negative.  Negative for pallor and rash.   Neurological: Negative for dizziness, weakness and headaches.   Hematological: Negative for adenopathy. Bruises/bleeds easily.   All other systems reviewed and are negative.      Is on anticoagulation therapy    Objective:      Physical Exam   Constitutional: He is oriented to person, place, and time. He appears well-developed and well-nourished. No distress.   HENT:   Head: Normocephalic and atraumatic.   Right Ear: External ear normal.   Left Ear: External ear normal.   Nose: Nose normal.   Mouth/Throat: Oropharynx is clear and moist. No oropharyngeal exudate.   Eyes: Conjunctivae are normal. Pupils are equal, round, and reactive to light. Right eye exhibits no discharge. Left eye exhibits no discharge. No scleral icterus.   Neck: Normal range of motion. Neck supple. No JVD present. No thyromegaly present.   Cardiovascular: Normal rate, regular rhythm, normal heart sounds and intact distal pulses.   No murmur heard.  Pulmonary/Chest: Effort normal and breath sounds normal. No respiratory distress. He has no wheezes. He has no rales.   Abdominal: Soft. Bowel sounds are normal. He exhibits no distension. There is no tenderness.   Musculoskeletal: Normal range of motion. He exhibits edema (trace bilateral pedal edema ).   Lymphadenopathy:     He has no cervical adenopathy.   Neurological: He is alert and oriented to person, place, and time.   Skin: Skin is warm and dry. Capillary refill takes less than 2 seconds. He is not diaphoretic. No pallor.   Psychiatric: He has a normal mood and affect. His behavior is normal.   Nursing note and vitals reviewed.      Assessment:       1. Chronic cough    2. Current use of long term anticoagulation    3. Bilateral lower extremity edema    4. Paroxysmal atrial fibrillation    5. Current every day smoker    6. Congestive heart failure, unspecified HF chronicity, unspecified heart failure type        Plan:       Chronic  cough  -     X-Ray Chest PA And Lateral; Future; Expected date: 02/01/2019  -     levalbuterol (XOPENEX HFA) 45 mcg/actuation inhaler; Inhale 1-2 puffs into the lungs every 6 (six) hours as needed for Wheezing or Shortness of Breath. Rescue  Dispense: 15 g; Refill: 0   Will send patient for chest xray.  He reports he had an MRI at [facility] after his surgery and it proved no significant findings.  Will have him sign consent to obtain previous radiology reports.  Suspect his cough is related to tobacco use.  He has agreed to a trial of Chantix to assist in cessation efforts.  He will be given a xopenx inhaler to use as a PRN medication for relief of wheezing.  No wheezing on exam today.  Also discussed patients need to follow up with his cardiologist as his cough may also be related to his known CHF.  He was encouraged to continue his Lasix as prescribed and weight himself daily. If he gains more than 2 to 3 pounds daily he is to call his cardiologist for an appointment.  He states he has an appointment scheduled for 2/20/19.   Current use of long term anticoagulation  Bleeding risks reinforced to patient.    Bilateral lower extremity edema  Resolving.  Pt. To avoid high sodium foods and take current medications as prescribed by cardiology.   Paroxysmal atrial fibrillation  Regular rhythm today.  Patient did undergo cardioversion and is on anticoagulation.  Cardiology following.   Current every day smoker  -     varenicline (CHANTIX STARTING MONTH BOX) 0.5 mg (11)- 1 mg (42) tablet; Take one 0.5mg tab by mouth once daily X3 days,then increase to one 0.5mg tab twice daily X4 days,then increase to one 1mg tab twice daily  Dispense: 1 Package; Refill: 0  -     levalbuterol (XOPENEX HFA) 45 mcg/actuation inhaler; Inhale 1-2 puffs into the lungs every 6 (six) hours as needed for Wheezing or Shortness of Breath. Rescue  Dispense: 15 g; Refill: 0  Counseled briefly on risks associated with tobacco use.  Chantix as  prescribed.  F/u as scheduled   Congestive heart failure, unspecified HF chronicity, unspecified heart failure type  Plan as noted.   Other orders  -     benzonatate (TESSALON) 200 MG capsule; Take 1 capsule (200 mg total) by mouth 3 (three) times daily as needed for Cough.  Dispense: 30 capsule; Refill: 0    I have reviewed the patient's past medical/surgical and social histories and updated as appropriate. Medications were reviewed and discussed as appropriate including side effects and risks versus benefit.   Plan of care was reviewed and agreed upon with the patient.  An opportunity to ask questions was provided and explanation given. Patient verbalized understanding on all information reviewed and discussed.  The patient will follow in 2 weeks to establish as a primary care patient or sooner if needed. If symptoms worsen patient may call for ASAP appointment or report to the emergency department for further evaluation.       Medication List with Changes/Refills   New Medications    BENZONATATE (TESSALON) 200 MG CAPSULE    Take 1 capsule (200 mg total) by mouth 3 (three) times daily as needed for Cough.    LEVALBUTEROL (XOPENEX HFA) 45 MCG/ACTUATION INHALER    Inhale 1-2 puffs into the lungs every 6 (six) hours as needed for Wheezing or Shortness of Breath. Rescue    VARENICLINE (CHANTIX STARTING MONTH BOX) 0.5 MG (11)- 1 MG (42) TABLET    Take one 0.5mg tab by mouth once daily X3 days,then increase to one 0.5mg tab twice daily X4 days,then increase to one 1mg tab twice daily   Current Medications    APIXABAN (ELIQUIS) 5 MG (74 TABS) DSPK    Take by mouth. For the first 7 days take two 5 mg tablets twice daily.  After 7 days take one 5 mg tablet twice daily.    FUROSEMIDE (LASIX) 20 MG TABLET    Take 20 mg by mouth 2 (two) times daily as needed.     LOSARTAN (COZAAR) 25 MG TABLET        METOPROLOL SUCCINATE (TOPROL-XL) 25 MG 24 HR TABLET    Take 25 mg by mouth 2 (two) times daily.     SPIRONOLACTONE ORAL    Take  37.5 mg by mouth once daily.   Discontinued Medications    ELIQUIS 5 MG TAB        LOSARTAN POTASSIUM (LOSARTAN ORAL)    Take by mouth.    METOPROLOL SUCCINATE (TOPROL-XL) 50 MG 24 HR TABLET        SPIRONOLACTONE (ALDACTONE) 25 MG TABLET

## 2019-02-01 NOTE — PATIENT INSTRUCTIONS
How to Quit Smoking  Smoking is one of the hardest habits to break. About half of all people who have ever smoked have been able to quit. Most people who still smoke want to quit. Here are some of the best ways to stop smoking.    Keep trying  Most smokers make many attempts at quitting before they are successful. Its important not to give up.  Go cold turkey  Most former smokers quit cold turkey (all at once). Trying to cut back gradually doesn't seem to work as well, perhaps because it continues the smoking habit. Also, it is possible to inhale more while smoking fewer cigarettes. This results in the same amount of nicotine in your body.  Get support  Support programs can be a big help, especially for heavy smokers. These groups offer lectures, ways to change behavior, and peer support. Here are some ways to find a support program:  · Free national quitline: 800-QUIT-NOW (078-180-3951).  · Hospital quit-smoking programs.  · American Lung Association: (363.566.3522).  · American Cancer Society (062-355-5455).  Support at home is important too. Nonsmokers can offer praise and encouragement. If the smoker in your life finds it hard to quit, encourage them to keep trying.  Over-the-counter medicines  Nicotine replacement therapy may make quitting easier. Certain aids, such as the nicotine patch, gum, and lozenges, are available without a prescription. It is best to use these under a doctors care, though. The skin patch provides a steady supply of nicotine. Nicotine gum and lozenges give temporary bursts of low levels of nicotine. Both methods reduce the craving for cigarettes. Warning: If you have nausea, vomiting, dizziness, weakness, or a fast heartbeat, stop using these products and see your doctor.  Prescription medicines  After reviewing your smoking patterns and past attempts to quit, your doctor may offer a prescription medicine such as bupropion, varenicline, a nicotine inhaler, or nasal spray. Each has  "advantages and side effects. Your doctor can review these with you.  Health benefits of quitting  The benefits of quitting start right away and keep improving the longer you go without smoking. These benefits occur at any age.  So whether you are 17 or 70, quitting is a good decision. Some of the benefits include:  · 20 minutes: Blood pressure and pulse return to normal.  · 8 hours: Oxygen levels return to normal.  · 2 days: Ability to smell and taste begin to improve as damaged nerves regrow.  · 2 to 3 weeks: Circulation and lung function improve.  · 1 to 9 months: Coughing, congestion, and shortness of breath decrease; tiredness decreases.  · 1 year: Risk of heart attack decreases by half.  · 5 years: Risk of lung cancer decreases by half; risk of stroke becomes the same as a nonsmokers.  For more on how to quit smoking, try these online resources:   · Med fusion.gov  · "Clearing the Air" booklet from the National Cancer Turner: [employer].gov/sites/default/files/pdf/clearing-the-air-accessible.pdf  Date Last Reviewed: 3/1/2017  © 1764-8617 [employer]. [address] [place] [address]. All rights reserved. This information is not intended as a substitute for professional medical care. Always follow your healthcare professional's instructions.        Varenicline oral tablets  What is this medicine?  VARENICLINE (dany EN i kleen) is used to help people quit smoking. It can reduce the symptoms caused by stopping smoking. It is used with a patient support program recommended by your physician.  How should I use this medicine?  Take this medicine by mouth after eating. Take with a full glass of water. Follow the directions on the prescription label. Take your doses at regular intervals. Do not take your medicine more often than directed.  There are 3 ways you can use this medicine to help you quit smoking; talk to your health care professional to decide which plan is right for you:  1) you can " choose a quit date and start this medicine 1 week before the quit date, or,  2) you can start taking this medicine before you choose a quit date, and then pick a quit date between day 8 and 35 days of treatment, or,  3) if you are not sure that you are able or willing to quit smoking right away, start taking this medicine and slowly decrease the amount you smoke as directed by your health care professional with the goal of being cigarette-free by week 12 of treatment.  Stick to your plan; ask about support groups or other ways to help you remain cigarette-free. If you are motivated to quit smoking and did not succeed during a previous attempt with this medicine for reasons other than side effects, or if you returned to smoking after this treatment, speak with your health care professional about whether another course of this medicine may be right for you.  A special MedGuide will be given to you by the pharmacist with each prescription and refill. Be sure to read this information carefully each time.  Talk to your pediatrician regarding the use of this medicine in children. This medicine is not approved for use in children.  What side effects may I notice from receiving this medicine?  Side effects that you should report to your doctor or health care professional as soon as possible:  · allergic reactions like skin rash, itching or hives, swelling of the face, lips, tongue, or throat  · acting aggressive, being angry or violent, or acting on dangerous impulses  · breathing problems  · changes in vision  · chest pain or chest tightness  · confusion, trouble speaking or understanding  · new or worsening depression, anxiety, or panic attacks  · extreme increase in activity and talking (oli)  · fast, irregular heartbeat  · feeling faint or lightheaded, falls  · fever  · pain in legs when walking  · problems with balance, talking, walking  · redness, blistering, peeling or loosening of the skin, including inside the  mouth  · ringing in ears  · seeing or hearing things that aren't there (hallucinations)  · seizures  · sleepwalking  · sudden numbness or weakness of the face, arm or leg  · thoughts about suicide or dying, or attempts to commit suicide  · trouble passing urine or change in the amount of urine  · unusual bleeding or bruising  · unusually weak or tired  Side effects that usually do not require medical attention (report to your doctor or health care professional if they continue or are bothersome):  · constipation  · headache  · nausea, vomiting  · strange dreams  · stomach gas  · trouble sleeping  What may interact with this medicine?  · alcohol or any product that contains alcohol  · insulin  · other stop smoking aids  · theophylline  · warfarin  What if I miss a dose?  If you miss a dose, take it as soon as you can. If it is almost time for your next dose, take only that dose. Do not take double or extra doses.  Where should I keep my medicine?  Keep out of the reach of children.  Store at room temperature between 15 and 30 degrees C (59 and 86 degrees F). Throw away any unused medicine after the expiration date.  What should I tell my health care provider before I take this medicine?  They need to know if you have any of these conditions:  · bipolar disorder, depression, schizophrenia or other mental illness  · heart disease  · if you often drink alcohol  · kidney disease  · peripheral vascular disease  · seizures  · stroke  · suicidal thoughts, plans, or attempt; a previous suicide attempt by you or a family member  · an unusual or allergic reaction to varenicline, other medicines, foods, dyes, or preservatives  · pregnant or trying to get pregnant  · breast-feeding  What should I watch for while using this medicine?  Visit your doctor or health care professional for regular check ups. Ask for ongoing advice and encouragement from your doctor or healthcare professional, friends, and family to help you quit. If  you smoke while on this medication, quit again  Your mouth may get dry. Chewing sugarless gum or sucking hard candy, and drinking plenty of water may help. Contact your doctor if the problem does not go away or is severe.  You may get drowsy or dizzy. Do not drive, use machinery, or do anything that needs mental alertness until you know how this medicine affects you. Do not stand or sit up quickly, especially if you are an older patient. This reduces the risk of dizzy or fainting spells.  Sleepwalking can happen during treatment with this medicine, and can sometimes lead to behavior that is harmful to you, other people, or property. Stop taking this medicine and tell your doctor if you start sleepwalking or have other unusual sleep-related activity.  Decrease the amount of alcoholic beverages that you drink during treatment with this medicine until you know if this medicine affects your ability to tolerate alcohol. Some people have experienced increased drunkenness (intoxication), unusual or sometimes aggressive behavior, or no memory of things that have happened (amnesia) during treatment with this medicine.  The use of this medicine may increase the chance of suicidal thoughts or actions. Pay special attention to how you are responding while on this medicine. Any worsening of mood, or thoughts of suicide or dying should be reported to your health care professional right away.  NOTE:This sheet is a summary. It may not cover all possible information. If you have questions about this medicine, talk to your doctor, pharmacist, or health care provider. Copyright© 2017 [employer]

## 2019-02-02 ENCOUNTER — HOSPITAL ENCOUNTER (OUTPATIENT)
Dept: RADIOLOGY | Facility: CLINIC | Age: 64
Discharge: HOME | End: 2019-02-02
Attending: NURSE PRACTITIONER
Payer: COMMERCIAL

## 2019-02-02 DIAGNOSIS — R05.3 CHRONIC COUGH: ICD-10-CM

## 2019-02-02 PROCEDURE — 71046 XR CHEST PA AND LATERAL: ICD-10-PCS | Mod: 26,,, | Performed by: RADIOLOGY

## 2019-02-02 PROCEDURE — 71046 X-RAY EXAM CHEST 2 VIEWS: CPT | Mod: 26,,, | Performed by: RADIOLOGY

## 2019-02-02 PROCEDURE — 71046 X-RAY EXAM CHEST 2 VIEWS: CPT | Mod: TC,FY,PO

## 2019-09-30 ENCOUNTER — OFFICE VISIT (OUTPATIENT)
Dept: PRIMARY CARE CLINIC | Facility: CLINIC | Age: 64
End: 2019-09-30
Attending: NURSE PRACTITIONER
Payer: COMMERCIAL

## 2019-09-30 VITALS
RESPIRATION RATE: 18 BRPM | HEIGHT: 73 IN | DIASTOLIC BLOOD PRESSURE: 88 MMHG | TEMPERATURE: 99 F | BODY MASS INDEX: 31.43 KG/M2 | OXYGEN SATURATION: 96 % | WEIGHT: 237.13 LBS | SYSTOLIC BLOOD PRESSURE: 140 MMHG | HEART RATE: 82 BPM

## 2019-09-30 DIAGNOSIS — Z79.01 CURRENT USE OF LONG TERM ANTICOAGULATION: ICD-10-CM

## 2019-09-30 DIAGNOSIS — G89.29 CHRONIC LEFT-SIDED LOW BACK PAIN WITH LEFT-SIDED SCIATICA: Primary | ICD-10-CM

## 2019-09-30 DIAGNOSIS — M54.42 CHRONIC LEFT-SIDED LOW BACK PAIN WITH LEFT-SIDED SCIATICA: Primary | ICD-10-CM

## 2019-09-30 DIAGNOSIS — M51.36 DEGENERATIVE DISC DISEASE, LUMBAR: ICD-10-CM

## 2019-09-30 DIAGNOSIS — M54.16 LUMBAR RADICULOPATHY: ICD-10-CM

## 2019-09-30 PROCEDURE — 3008F BODY MASS INDEX DOCD: CPT | Mod: CPTII,S$GLB,, | Performed by: NURSE PRACTITIONER

## 2019-09-30 PROCEDURE — 99213 OFFICE O/P EST LOW 20 MIN: CPT | Mod: S$GLB,,, | Performed by: NURSE PRACTITIONER

## 2019-09-30 PROCEDURE — 1101F PR PT FALLS ASSESS DOC 0-1 FALLS W/OUT INJ PAST YR: ICD-10-PCS | Mod: CPTII,S$GLB,, | Performed by: NURSE PRACTITIONER

## 2019-09-30 PROCEDURE — 99999 PR PBB SHADOW E&M-EST. PATIENT-LVL IV: ICD-10-PCS | Mod: PBBFAC,,, | Performed by: NURSE PRACTITIONER

## 2019-09-30 PROCEDURE — 99213 PR OFFICE/OUTPT VISIT, EST, LEVL III, 20-29 MIN: ICD-10-PCS | Mod: S$GLB,,, | Performed by: NURSE PRACTITIONER

## 2019-09-30 PROCEDURE — 3008F PR BODY MASS INDEX (BMI) DOCUMENTED: ICD-10-PCS | Mod: CPTII,S$GLB,, | Performed by: NURSE PRACTITIONER

## 2019-09-30 PROCEDURE — 99999 PR PBB SHADOW E&M-EST. PATIENT-LVL IV: CPT | Mod: PBBFAC,,, | Performed by: NURSE PRACTITIONER

## 2019-09-30 PROCEDURE — 1101F PT FALLS ASSESS-DOCD LE1/YR: CPT | Mod: CPTII,S$GLB,, | Performed by: NURSE PRACTITIONER

## 2019-09-30 RX ORDER — APIXABAN 5 MG/1
5 TABLET, FILM COATED ORAL 2 TIMES DAILY
COMMUNITY
Start: 2019-09-27 | End: 2021-07-06 | Stop reason: SDUPTHER

## 2019-09-30 RX ORDER — METOPROLOL SUCCINATE 50 MG/1
50 TABLET, EXTENDED RELEASE ORAL DAILY
Refills: 3 | COMMUNITY
Start: 2019-09-18 | End: 2021-11-19 | Stop reason: SDUPTHER

## 2019-09-30 RX ORDER — SPIRONOLACTONE 25 MG/1
25 TABLET ORAL DAILY
Refills: 3 | COMMUNITY
Start: 2019-09-18 | End: 2021-03-11 | Stop reason: SDUPTHER

## 2019-09-30 RX ORDER — SILDENAFIL 50 MG/1
TABLET, FILM COATED ORAL
Refills: 0 | COMMUNITY
Start: 2019-08-23 | End: 2020-09-08

## 2019-09-30 RX ORDER — METHOCARBAMOL 500 MG/1
500 TABLET, FILM COATED ORAL 3 TIMES DAILY PRN
Qty: 30 TABLET | Refills: 0 | Status: SHIPPED | OUTPATIENT
Start: 2019-09-30 | End: 2020-06-24

## 2019-09-30 NOTE — PATIENT INSTRUCTIONS
Methocarbamol tablets  What is this medicine?  METHOCARBAMOL (meth oh BRANDY ba mole) helps to relieve pain and stiffness in muscles caused by strains, sprains, or other injury to your muscles.  How should I use this medicine?  Take this medicine by mouth with a full glass of water. Follow the directions on the prescription label. Take your medicine at regular intervals. Do not take your medicine more often than directed.  Talk to your pediatrician regarding the use of this medicine in children. Special care may be needed.  What side effects may I notice from receiving this medicine?  Side effects that you should report to your doctor or health care professional as soon as possible:  · allergic reactions like skin rash, itching or hives, swelling of the face, lips, or tongue  · breathing problems  · confusion  · seizures  · unusually weak or tired  Side effects that usually do not require medical attention (report to your doctor or health care professional if they continue or are bothersome):  · dizziness  · headache  · metallic taste  · tiredness  · upset stomach  What may interact with this medicine?  Do not take this medication with any of the following medicines:  · narcotic medicines for cough  This medicine may also interact with the following medications:  · alcohol  · antihistamines for allergy, cough and cold  · certain medicines for anxiety or sleep  · certain medicines for depression like amitriptyline, fluoxetine, sertraline  · certain medicines for seizures like phenobarbital, primidone  · cholinesterase inhibitors like neostigmine, ambenonium, and pyridostigmine bromide  · general anesthetics like halothane, isoflurane, methoxyflurane, propofol  · local anesthetics like lidocaine, pramoxine, tetracaine  · medicines that relax muscles for surgery  · narcotic medicines for pain  · phenothiazines like chlorpromazine, mesoridazine, prochlorperazine, thioridazine  What if I miss a dose?  If you miss a dose,  take it as soon as you can. If it is almost time for your next dose, take only the next dose. Do not take double or extra doses.  Where should I keep my medicine?  Keep out of the reach of children.  Store at room temperature between 20 and 25 degrees C (68 and 77 degrees F). Keep container tightly closed. Throw away any unused medicine after the expiration date.  What should I tell my health care provider before I take this medicine?  They need to know if you have any of these conditions:  · kidney disease  · seizures  · an unusual or allergic reaction to methocarbamol, other medicines, foods, dyes, or preservatives  · pregnant or trying to get pregnant  · breast-feeding  What should I watch for while using this medicine?  Tell your doctor or health care professional if your symptoms do not start to get better or if they get worse.  You may get drowsy or dizzy. Do not drive, use machinery, or do anything that needs mental alertness until you know how this medicine affects you. Do not stand or sit up quickly, especially if you are an older patient. This reduces the risk of dizzy or fainting spells. Alcohol may interfere with the effect of this medicine. Avoid alcoholic drinks.  If you are taking another medicine that also causes drowsiness, you may have more side effects. Give your health care provider a list of all medicines you use. Your doctor will tell you how much medicine to take. Do not take more medicine than directed. Call emergency for help if you have problems breathing or unusual sleepiness.  NOTE:This sheet is a summary. It may not cover all possible information. If you have questions about this medicine, talk to your doctor, pharmacist, or health care provider. Copyright© 2017 [employer]

## 2019-09-30 NOTE — PROGRESS NOTES
"Subjective:       Patient ID: Gregorio Guerra is a 64 y.o. male.    Chief Complaint: Back Pain (lower back pain, seeing chiropractor for 20 years)    Low-back Pain   This is a chronic (Is followed by Dr. Rohit Perdue and Dr. Abraham, Ortho.  Last xray 9/18 per Dr. Mariee DDD lumbar spine.  Pt. complained at ortho visit same symptoms greater than 2 years.  Today pt. reports symptoms are without change.  He is requesting MRI of back. ) problem. The current episode started more than 1 year ago. The problem occurs constantly. The problem has been waxing and waning. Associated symptoms include arthralgias and numbness. Pertinent negatives include no abdominal pain, anorexia, change in bowel habit, chest pain, congestion, diaphoresis, fatigue, fever, joint swelling, myalgias, nausea, rash, urinary symptoms, vomiting or weakness. Associated symptoms comments: Left hip with chronic pain "needs replacement has arthritis".  Left bottom of foot "numb". Exacerbated by: certain position changes. He has tried acetaminophen, NSAIDs, rest, position changes and relaxation for the symptoms. The treatment provided no relief.     Review of Systems   Constitutional: Negative for diaphoresis, fatigue and fever.   HENT: Negative.  Negative for congestion.    Respiratory: Negative.    Cardiovascular: Negative.  Negative for chest pain, palpitations and leg swelling.   Gastrointestinal: Negative for abdominal distention, abdominal pain, anorexia, blood in stool, change in bowel habit, constipation, nausea, rectal pain and vomiting.   Genitourinary: Negative for decreased urine volume and difficulty urinating.   Musculoskeletal: Positive for arthralgias and back pain. Negative for joint swelling and myalgias.   Skin: Negative for rash.   Neurological: Positive for numbness. Negative for dizziness, weakness and light-headedness.   Psychiatric/Behavioral: Positive for sleep disturbance.   All other systems reviewed and are negative.    "   Objective:      Physical Exam   Constitutional: He is oriented to person, place, and time. He appears well-developed and well-nourished. No distress.   HENT:   Head: Normocephalic and atraumatic.   Cardiovascular: Normal rate, regular rhythm, normal heart sounds and intact distal pulses.   Pulmonary/Chest: Effort normal and breath sounds normal. No respiratory distress.   Abdominal: Soft. He exhibits no distension.   Musculoskeletal: Normal range of motion. He exhibits tenderness (pt. with complaints of pain paraspinal muscle lumbar spine with flexion.  SLR negative today.  ). He exhibits no edema or deformity.   Neurological: He is alert and oriented to person, place, and time. He has normal strength and normal reflexes. No cranial nerve deficit. He exhibits normal muscle tone. Coordination and gait normal.   Skin: Skin is warm and dry. He is not diaphoretic.   Nursing note and vitals reviewed.      Medication List with Changes/Refills   New Medications    METHOCARBAMOL (ROBAXIN) 500 MG TAB    Take 1 tablet (500 mg total) by mouth 3 (three) times daily as needed.   Current Medications    APIXABAN (ELIQUIS) 5 MG (74 TABS) DSPK    Take by mouth. For the first 7 days take two 5 mg tablets twice daily.  After 7 days take one 5 mg tablet twice daily.    ELIQUIS 5 MG TAB        FUROSEMIDE (LASIX) 20 MG TABLET    Take 20 mg by mouth 2 (two) times daily as needed.     LEVALBUTEROL (XOPENEX HFA) 45 MCG/ACTUATION INHALER    Inhale 1-2 puffs into the lungs every 6 (six) hours as needed for Wheezing or Shortness of Breath. Rescue    LOSARTAN (COZAAR) 25 MG TABLET        METOPROLOL SUCCINATE (TOPROL-XL) 25 MG 24 HR TABLET    Take 25 mg by mouth 2 (two) times daily.     METOPROLOL SUCCINATE (TOPROL-XL) 50 MG 24 HR TABLET    Take 50 mg by mouth once daily.    SILDENAFIL (VIAGRA) 50 MG TABLET    TAKE 1 TABLET BY MOUTH AS NEEDED FOR ERECTILE DYSFUNCTION    SPIRONOLACTONE (ALDACTONE) 25 MG TABLET    Take 25 mg by mouth once daily.     SPIRONOLACTONE ORAL    Take 37.5 mg by mouth once daily.    VARENICLINE (CHANTIX STARTING MONTH BOX) 0.5 MG (11)- 1 MG (42) TABLET    Take one 0.5mg tab by mouth once daily X3 days,then increase to one 0.5mg tab twice daily X4 days,then increase to one 1mg tab twice daily       Assessment:       1. Chronic left-sided low back pain with left-sided sciatica    2. Lumbar radiculopathy    3. Current use of long term anticoagulation    4. Degenerative disc disease, lumbar        Plan:       Chronic left-sided low back pain with left-sided sciatica  -     Ambulatory Referral to Back & Spine Clinic  -     CT Lumbar Spine Without Contrast; Future; Expected date: 09/30/2019  -     methocarbamol (ROBAXIN) 500 MG Tab; Take 1 tablet (500 mg total) by mouth 3 (three) times daily as needed.  Dispense: 30 tablet; Refill: 0  Pt. Was instructed to not take any NSAIDS as he is on Eliquis.  He may take OTC tylenol as per label instructions.  He will be given a one time RX for muscle relaxer to use as needed for muscle tightness and spasm.  We will place referral to back and spine clinic and also order CT for further evaluation.  Pt. Is to call his ortho Dr. Perdue and PCP for follow up appts.    Lumbar radiculopathy  -     Ambulatory Referral to Back & Spine Clinic  -     CT Lumbar Spine Without Contrast; Future; Expected date: 09/30/2019  No changes in bowel or bladder habits.  No change in symptoms.  Plan as noted above.   Current use of long term anticoagulation  Bleeding precautions advised.  NO NSAID use.   Degenerative disc disease, lumbar  -     CT Lumbar Spine Without Contrast; Future; Expected date: 09/30/2019          Follow up if symptoms worsen or fail to improve, for follow up with PCP and spine clinic.  Also call Dr. Perdue for appt. .If symptoms worsen patient may call for ASAP appointment or report to the emergency department for further evaluation.       I have reviewed the patient's past medical/surgical and  social histories and updated as appropriate. Medications were reviewed and discussed as appropriate including side effects and risks versus benefit. Plan of care was reviewed and agreed upon with the patient.  An opportunity to ask questions was provided and explanation given. Patient verbalized understanding on all information reviewed and discussed.

## 2019-10-03 ENCOUNTER — HOSPITAL ENCOUNTER (OUTPATIENT)
Dept: RADIOLOGY | Facility: HOSPITAL | Age: 64
Discharge: HOME | End: 2019-10-03
Attending: NURSE PRACTITIONER
Payer: COMMERCIAL

## 2019-10-03 DIAGNOSIS — M51.36 DEGENERATIVE DISC DISEASE, LUMBAR: ICD-10-CM

## 2019-10-03 DIAGNOSIS — M54.42 CHRONIC LEFT-SIDED LOW BACK PAIN WITH LEFT-SIDED SCIATICA: ICD-10-CM

## 2019-10-03 DIAGNOSIS — G89.29 CHRONIC LEFT-SIDED LOW BACK PAIN WITH LEFT-SIDED SCIATICA: ICD-10-CM

## 2019-10-03 DIAGNOSIS — M54.16 LUMBAR RADICULOPATHY: ICD-10-CM

## 2019-10-03 PROCEDURE — 72131 CT LUMBAR SPINE W/O DYE: CPT | Mod: TC

## 2019-10-03 PROCEDURE — 72131 CT LUMBAR SPINE WITHOUT CONTRAST: ICD-10-PCS | Mod: 26,,, | Performed by: RADIOLOGY

## 2019-10-03 PROCEDURE — 72131 CT LUMBAR SPINE W/O DYE: CPT | Mod: 26,,, | Performed by: RADIOLOGY

## 2019-10-09 ENCOUNTER — OFFICE VISIT (OUTPATIENT)
Dept: UROLOGY | Facility: CLINIC | Age: 64
End: 2019-10-09
Payer: COMMERCIAL

## 2019-10-09 ENCOUNTER — OFFICE VISIT (OUTPATIENT)
Dept: SPINE | Facility: CLINIC | Age: 64
End: 2019-10-09
Payer: COMMERCIAL

## 2019-10-09 VITALS
BODY MASS INDEX: 31.41 KG/M2 | SYSTOLIC BLOOD PRESSURE: 125 MMHG | TEMPERATURE: 98 F | DIASTOLIC BLOOD PRESSURE: 78 MMHG | HEART RATE: 80 BPM | WEIGHT: 237 LBS | RESPIRATION RATE: 18 BRPM | HEIGHT: 73 IN

## 2019-10-09 VITALS
BODY MASS INDEX: 31.44 KG/M2 | SYSTOLIC BLOOD PRESSURE: 106 MMHG | HEIGHT: 73 IN | HEART RATE: 80 BPM | DIASTOLIC BLOOD PRESSURE: 73 MMHG | WEIGHT: 237.19 LBS

## 2019-10-09 DIAGNOSIS — M54.50 CHRONIC BILATERAL LOW BACK PAIN WITHOUT SCIATICA: Primary | ICD-10-CM

## 2019-10-09 DIAGNOSIS — N40.0 BENIGN PROSTATIC HYPERPLASIA WITHOUT LOWER URINARY TRACT SYMPTOMS: ICD-10-CM

## 2019-10-09 DIAGNOSIS — M48.061 SPINAL STENOSIS OF LUMBAR REGION WITHOUT NEUROGENIC CLAUDICATION: ICD-10-CM

## 2019-10-09 DIAGNOSIS — N20.0 CALCULUS OF KIDNEY: Primary | ICD-10-CM

## 2019-10-09 DIAGNOSIS — M47.816 LUMBAR SPONDYLOSIS: ICD-10-CM

## 2019-10-09 DIAGNOSIS — G89.29 CHRONIC BILATERAL LOW BACK PAIN WITHOUT SCIATICA: Primary | ICD-10-CM

## 2019-10-09 LAB
BILIRUB SERPL-MCNC: NORMAL MG/DL
BLOOD URINE, POC: NORMAL
COLOR, POC UA: YELLOW
GLUCOSE UR QL STRIP: NORMAL
KETONES UR QL STRIP: NORMAL
LEUKOCYTE ESTERASE URINE, POC: NORMAL
NITRITE, POC UA: NORMAL
PH, POC UA: 6
PROTEIN, POC: NORMAL
SPECIFIC GRAVITY, POC UA: 1.01
UROBILINOGEN, POC UA: NORMAL

## 2019-10-09 PROCEDURE — 99999 PR PBB SHADOW E&M-EST. PATIENT-LVL IV: CPT | Mod: PBBFAC,,, | Performed by: UROLOGY

## 2019-10-09 PROCEDURE — 1101F PR PT FALLS ASSESS DOC 0-1 FALLS W/OUT INJ PAST YR: ICD-10-PCS | Mod: CPTII,S$GLB,, | Performed by: UROLOGY

## 2019-10-09 PROCEDURE — 99244 PR OFFICE CONSULTATION,LEVEL IV: ICD-10-PCS | Mod: S$GLB,,, | Performed by: PHYSICIAN ASSISTANT

## 2019-10-09 PROCEDURE — 81002 POCT URINE DIPSTICK WITHOUT MICROSCOPE: ICD-10-PCS | Mod: S$GLB,,, | Performed by: UROLOGY

## 2019-10-09 PROCEDURE — 81002 URINALYSIS NONAUTO W/O SCOPE: CPT | Mod: S$GLB,,, | Performed by: UROLOGY

## 2019-10-09 PROCEDURE — 1101F PT FALLS ASSESS-DOCD LE1/YR: CPT | Mod: CPTII,S$GLB,, | Performed by: UROLOGY

## 2019-10-09 PROCEDURE — 3008F PR BODY MASS INDEX (BMI) DOCUMENTED: ICD-10-PCS | Mod: CPTII,S$GLB,, | Performed by: UROLOGY

## 2019-10-09 PROCEDURE — 99999 PR PBB SHADOW E&M-EST. PATIENT-LVL IV: ICD-10-PCS | Mod: PBBFAC,,, | Performed by: UROLOGY

## 2019-10-09 PROCEDURE — 99204 PR OFFICE/OUTPT VISIT, NEW, LEVL IV, 45-59 MIN: ICD-10-PCS | Mod: 25,S$GLB,, | Performed by: UROLOGY

## 2019-10-09 PROCEDURE — 99999 PR PBB SHADOW E&M-EST. PATIENT-LVL IV: CPT | Mod: PBBFAC,,, | Performed by: PHYSICIAN ASSISTANT

## 2019-10-09 PROCEDURE — 99999 PR PBB SHADOW E&M-EST. PATIENT-LVL IV: ICD-10-PCS | Mod: PBBFAC,,, | Performed by: PHYSICIAN ASSISTANT

## 2019-10-09 PROCEDURE — 99204 OFFICE O/P NEW MOD 45 MIN: CPT | Mod: 25,S$GLB,, | Performed by: UROLOGY

## 2019-10-09 PROCEDURE — 3008F BODY MASS INDEX DOCD: CPT | Mod: CPTII,S$GLB,, | Performed by: UROLOGY

## 2019-10-09 PROCEDURE — 99244 OFF/OP CNSLTJ NEW/EST MOD 40: CPT | Mod: S$GLB,,, | Performed by: PHYSICIAN ASSISTANT

## 2019-10-09 NOTE — LETTER
October 9, 2019      Mandi Landry, DNP, NP-C  [address]  [address]  [facility] [address]           [place] - Urology  [address] [place] [address] [address]  [facility] [address]  Phone: [phone]  Fax: [phone]          Patient: Gregorio Guerra   MR Number: [id number]   YOB: 1955   Date of Visit: 10/9/2019       Dear Mandi Landry:    Thank you for referring Gregorio Guerra to me for evaluation. Attached you will find relevant portions of my assessment and plan of care.    If you have questions, please do not hesitate to call me. I look forward to following Gregorio Guerra along with you.    Sincerely,    Louie Sunshine MD    Enclosure  CC:  No Recipients    If you would like to receive this communication electronically, please contact [email or web address] or [phone] to request more information on [employer] access.    For providers and/or their staff who would like to refer a patient to [facility], please contact us through our one-stop-shop provider referral line, [facility], at [phone].    If you feel you have received this communication in error or would no longer like to receive these types of communications, please e-mail [email or web address]

## 2019-10-09 NOTE — LETTER
October 16, 2019      Mandi Landry, DNP, NP-C  [address]  [address]  [place] [address]           [place] [facility]  [address]  [facility] [address]  Phone: [phone]  Fax: [phone]          Patient: Gregorio Guerra   MR Number: [id number]   YOB: 1955   Date of Visit: 10/9/2019       Dear Mandi Landry:    Thank you for referring Gregorio Guerra to me for evaluation. Attached you will find relevant portions of my assessment and plan of care.    If you have questions, please do not hesitate to call me. I look forward to following Gregorio Guerra along with you.    Sincerely,    ALEX Azevedoosure  CC:  No Recipients    If you would like to receive this communication electronically, please contact [email or web address] or [phone] to request more information on [employer] Link access.    For providers and/or their staff who would like to refer a patient to [facility], please contact us through our one-stop-shop provider referral line, [facility], at [phone].    If you feel you have received this communication in error or would no longer like to receive these types of communications, please e-mail [email or web address]

## 2019-10-09 NOTE — PROGRESS NOTES
"[unfilled]  [id number]  AGE:  64 y.o.     10/09/2019      DRUG ALLERGIES:  Lisinopril    CHIEF COMPLAINT:  Renal calculi    HISTORY OF PRESENT ILLNESS:  .  This 64-year-old male had undergone a CT scan of the lumbar spine on 10/03/2019 for evaluation of back pain.  As an incidental finding he was found to have bilateral nonobstructing renal calculi for which he is coming to us for further follow-up evaluation.  He denies any flank pain.  He does have a prior history of urinary calculi having passed stones approximately 8-10 years ago and none since then.    MEDICAL HISTORY:  Hypertension, atrial fibrillation    PAST SURGICAL HISTORY:  Cardioversion, right knee arthroscopy, left hip replacement, exploratory laparotomy following an injury in .    PRESENT MEDICATIONS:  Eliquis, Cozaar, Lasix, Robaxin, Aldactone, Toprol XL.    FAMILY HISTORY:  Father  from cirrhosis.  Mother  had breast cancer.  He has had 3 sisters all of whom are .    SOCIAL HISTORY:  He works as an  and [employer].  Single no children.  Tobacco 3 cigarettes a day.  Alcohol social intake.    REVIEW OF SYMPTOMS:  GENERAL:  No weight change.  Good appetite.  HEAD AND NECK:  No headaches.  Wears prescription reading glasses.  CARDIORESPIRATORY:  No chest pain or shortness of breath no cough   GASTROINTESTINAL:  No trouble swallowing no constipation or diarrhea.  MUSCULOSKELETAL:  Arthritis and low back pain.    PHYSICAL EXAMINATION:  VITAL SIGNS:  /78   Pulse 80   Temp 98.2 °F (36.8 °C) (Oral)   Resp 18   Ht 6' 1" (1.854 m)   Wt 107.5 kg (237 lb)   BMI 31.27 kg/m²   GENERAL:  Well-developed, well-nourished 64 y.o.  -year-old male, alert, oriented, cooperative, in no acute distress.  HEAD AND NECK:  Throat clear.  No adenopathy.  CHEST:  Clear.  HEART:  Regular.  No murmur.  ABDOMEN:  Soft, benign and nontender.  Well-healed midline surgical scar.  No masses.  No hernias.  No organomegaly.  EXTERNAL " GENITAL:  Normal phallus with adequate meatus.  Testes descended and   feel normal.  No scrotal masses.  RECTAL:   25 g smooth prostate.  No nodules.  Normal sphincter tone.    UA - negative pH 6.0    PSA not documented    FINAL IMPRESSION:  .  Renal calculi, BPH    RECOMMENDATIONS:  CT scan kidney stone protocol.  KUB.  PSA.

## 2019-10-15 NOTE — PROGRESS NOTES
Back and Spine New Patient    Patient ID: Gregorio Guerra is a 64 y.o. male.    Chief Complaint   Patient presents with    Low-back Pain     He has had low back pain for 40 years but it has gotten worse in the last 2 years. Pain radiates down left buttock and he has numbness in the bottom of his left foot. Nothing helps pain. The intensity changes. Exercise makes the left foot numb quicker. Stending on concrete all day at work makes the pain worse.       Review of Systems   Constitutional: Negative for activity change, chills, fatigue and unexpected weight change.   HENT: Negative for hearing loss, tinnitus, trouble swallowing and voice change.    Eyes: Negative for visual disturbance.   Respiratory: Negative for apnea, chest tightness and shortness of breath.    Cardiovascular: Negative for chest pain and palpitations.   Gastrointestinal: Negative for abdominal pain, constipation, diarrhea, nausea and vomiting.   Genitourinary: Negative for difficulty urinating, dysuria and frequency.   Musculoskeletal: Positive for back pain and myalgias. Negative for gait problem, neck pain and neck stiffness.   Skin: Negative for wound.   Neurological: Positive for numbness. Negative for dizziness, tremors, seizures, facial asymmetry, speech difficulty, weakness, light-headedness and headaches.   Psychiatric/Behavioral: Negative for confusion and decreased concentration.       Past Medical History:   Diagnosis Date    A-fib     Hypertension      Social History     Socioeconomic History    Marital status:      Spouse name: Not on file    Number of children: Not on file    Years of education: Not on file    Highest education level: Not on file   Occupational History    Not on file   Social Needs    Financial resource strain: Not on file    Food insecurity:     Worry: Not on file     Inability: Not on file    Transportation needs:     Medical: Not on file     Non-medical: Not on file   Tobacco Use    Smoking  status: Current Every Day Smoker     Packs/day: 0.50     Types: Cigarettes    Smokeless tobacco: Never Used   Substance and Sexual Activity    Alcohol use: Yes    Drug use: No    Sexual activity: Yes     Partners: Female   Lifestyle    Physical activity:     Days per week: Not on file     Minutes per session: Not on file    Stress: Not at all   Relationships    Social connections:     Talks on phone: Not on file     Gets together: Not on file     Attends [employer] service: Not on file     Active member of club or organization: Not on file     Attends meetings of clubs or organizations: Not on file     Relationship status: Not on file   Other Topics Concern    Not on file   Social History Narrative    Not on file     Family History   Problem Relation Age of Onset    Breast cancer Mother     Breast cancer Sister      Review of patient's allergies indicates:   Allergen Reactions    Lisinopril        Current Outpatient Medications:     apixaban (ELIQUIS) 5 mg (74 tabs) DsPk, Take by mouth. For the first 7 days take two 5 mg tablets twice daily.  After 7 days take one 5 mg tablet twice daily., Disp: , Rfl:     ELIQUIS 5 mg Tab, , Disp: , Rfl:     furosemide (LASIX) 20 MG tablet, Take 20 mg by mouth 2 (two) times daily as needed. , Disp: , Rfl:     losartan (COZAAR) 25 MG tablet, , Disp: , Rfl:     methocarbamol (ROBAXIN) 500 MG Tab, Take 1 tablet (500 mg total) by mouth 3 (three) times daily as needed., Disp: 30 tablet, Rfl: 0    metoprolol succinate (TOPROL-XL) 25 MG 24 hr tablet, Take 25 mg by mouth 2 (two) times daily. , Disp: , Rfl:     metoprolol succinate (TOPROL-XL) 50 MG 24 hr tablet, Take 50 mg by mouth once daily., Disp: , Rfl: 3    sildenafil (VIAGRA) 50 MG tablet, TAKE 1 TABLET BY MOUTH AS NEEDED FOR ERECTILE DYSFUNCTION, Disp: , Rfl: 0    spironolactone (ALDACTONE) 25 MG tablet, Take 25 mg by mouth once daily., Disp: , Rfl: 3    SPIRONOLACTONE ORAL, Take 37.5 mg by mouth once daily.,  "Disp: , Rfl:     Vitals:    10/09/19 1358   BP: 106/73   BP Location: Left arm   Patient Position: Sitting   BP Method: Large (Automatic)   Pulse: 80   Weight: 107.6 kg (237 lb 3.4 oz)   Height: 6' 1" (1.854 m)       Physical Exam   Constitutional: He is oriented to person, place, and time. He appears well-developed and well-nourished.   HENT:   Head: Normocephalic and atraumatic.   Eyes: Pupils are equal, round, and reactive to light.   Neck: Normal range of motion. Neck supple.   Cardiovascular: Normal rate.   Pulmonary/Chest: Effort normal.   Abdominal: He exhibits no distension.   Musculoskeletal: Normal range of motion. He exhibits no edema.   Neurological: He is alert and oriented to person, place, and time. He has a normal Finger-Nose-Finger Test, a normal Heel to Shin Test, a normal Romberg Test and a normal Tandem Gait Test. Gait normal.   Reflex Scores:       Tricep reflexes are 2+ on the right side and 2+ on the left side.       Bicep reflexes are 2+ on the right side and 2+ on the left side.       Brachioradialis reflexes are 2+ on the right side and 2+ on the left side.       Patellar reflexes are 2+ on the right side and 2+ on the left side.       Achilles reflexes are 2+ on the right side and 2+ on the left side.  Skin: Skin is warm and dry.   Psychiatric: He has a normal mood and affect. His speech is normal and behavior is normal. Judgment and thought content normal.   Nursing note and vitals reviewed.      Neurologic Exam     Mental Status   Oriented to person, place, and time.   Oriented to person.   Oriented to place.   Oriented to time.   Follows 3 step commands.   Attention: normal. Concentration: normal.   Speech: speech is normal   Level of consciousness: alert  Knowledge: consistent with education.   Able to name object. Able to read. Able to repeat. Able to write. Normal comprehension.     Cranial Nerves     CN II   Visual acuity: normal  Right visual field deficit: none  Left visual field " deficit: none     CN III, IV, VI   Pupils are equal, round, and reactive to light.  Right pupil: Size: 3 mm. Shape: regular. Reactivity: brisk. Consensual response: intact.   Left pupil: Size: 3 mm. Shape: regular. Reactivity: brisk. Consensual response: intact.   CN III: no CN III palsy  CN VI: no CN VI palsy  Nystagmus: none   Diplopia: none  Ophthalmoparesis: none  Conjugate gaze: present    CN V   Right facial sensation deficit: none  Left facial sensation deficit: none    CN VII   Right facial weakness: none  Left facial weakness: none    CN VIII   Hearing: intact    CN IX, X   CN IX normal.   CN X normal.     CN XI   Right sternocleidomastoid strength: normal  Left sternocleidomastoid strength: normal  Right trapezius strength: normal  Left trapezius strength: normal    CN XII   Fasciculations: absent  Tongue deviation: none    Motor Exam   Muscle bulk: normal  Overall muscle tone: normal  Right arm pronator drift: absent  Left arm pronator drift: absent    Strength   Right neck flexion: 5/5  Left neck flexion: 5/5  Right neck extension: 5/5  Left neck extension: 5/5  Right deltoid: 5/5  Left deltoid: 5/5  Right biceps: 5/5  Left biceps: 5/5  Right triceps: 5/5  Left triceps: 5/5  Right wrist flexion: 5/5  Left wrist flexion: 5/5  Right wrist extension: 5/5  Left wrist extension: 5/5  Right interossei: 5/5  Left interossei: 5/5  Right abdominals: 5/5  Left abdominals: 5/5  Right iliopsoas: 5/5  Left iliopsoas: 5/5  Right quadriceps: 5/5  Left quadriceps: 5/5  Right hamstrin/5  Left hamstrin/5  Right glutei: 5/5  Left glutei: 5/5  Right anterior tibial: 5/5  Left anterior tibial: 5/5  Right posterior tibial: 5/5  Left posterior tibial: 5/5  Right peroneal: 5/5  Left peroneal: 5/5  Right gastroc: 5/5  Left gastroc: 5/5    Sensory Exam   Right arm light touch: normal  Left arm light touch: normal  Right leg light touch: normal  Left leg light touch: normal  Right arm vibration: normal  Left arm vibration:  normal  Right arm pinprick: normal  Left arm pinprick: normal    Gait, Coordination, and Reflexes     Gait  Gait: normal    Coordination   Romberg: negative  Finger to nose coordination: normal  Heel to shin coordination: normal  Tandem walking coordination: normal    Tremor   Resting tremor: absent  Intention tremor: absent  Action tremor: absent    Reflexes   Right brachioradialis: 2+  Left brachioradialis: 2+  Right biceps: 2+  Left biceps: 2+  Right triceps: 2+  Left triceps: 2+  Right patellar: 2+  Left patellar: 2+  Right achilles: 2+  Left achilles: 2+  Right Hooper: absent  Left Hooper: absent  Right ankle clonus: absent  Left ankle clonus: absent      Provider dictation:  64 year old male smoker with hypertension and atrial fibrillation who is maintained on eliquis is referred by Mandi Landry for evaluation of back pain.  He has had lower back pain for 40 years and has increased over the last 2 years.  Pain is felt across the entire lower lumbar region.  He denies any associated radicular leg pain, numbness, tingling.  However, he has isolated numbness in the left foot plantar surface.  He has tried managing pain with Tylenol and Robaxin (causes drowsiness).  He has undergone significant chiropractic care since 1985 and only stopped 3-4 years ago.  Has not had any other treatments.  Oswestry score: 44%.  PHQ:  7.    On exam he is neurologically intact with 5/5 strength, 2+ muscle stretch reflexes, and no sensory deficits throughout the upper and lower extremities.    I reviewed a CT lumbar spine from 10/03/2019 revealing multilevel degenerative changes.  At L2-3 there is a diffuse disc bulge and ligamentous hypertrophy resulting in moderate bilateral foraminal narrowing with severe central canal stenosis.  At L3-4 and L4-5 broad-based and central disc extrusions create severe right and moderate left foraminal narrowing in addition to severe central canal stenosis.    In conclusion, this is a 64-year-old  male with chronic lower back pain and no significant radiculopathy.  He does have evidence of severe central canal stenosis with degenerative changes at L2-3, L3-4, L4-5.  However with no neurogenic claudication/ lower extremity radicular symptoms the nerves are not being affected at this time and lower back pain is more likely myofascial in nature than related to the stenosis.  I am recommending he attned physical therapy.  If no improvement with physical therapy we will obtain an MRI for further assessment and referred to pain management in [place].  Follow-up with me after physical therapy.    Visit Diagnosis:  Chronic bilateral low back pain without sciatica  -     Ambulatory Referral to Physical/Occupational Therapy; Future; Expected date: 10/09/2019    Lumbar spondylosis  -     Ambulatory Referral to Physical/Occupational Therapy; Future; Expected date: 10/09/2019    Spinal stenosis of lumbar region without neurogenic claudication  -     Ambulatory Referral to Physical/Occupational Therapy; Future; Expected date: 10/09/2019        Total time spent counseling greater than fifty percent of total visit time.  Counseling included discussion regarding imaging findings, diagnosis possibilities, treatment options, risks and benefits.   The patient had many questions regarding the options and long-term effects.

## 2019-10-21 ENCOUNTER — CLINICAL SUPPORT (OUTPATIENT)
Dept: URGENT CARE | Facility: CLINIC | Age: 64
End: 2019-10-21
Payer: COMMERCIAL

## 2019-10-21 VITALS
OXYGEN SATURATION: 96 % | HEART RATE: 93 BPM | SYSTOLIC BLOOD PRESSURE: 121 MMHG | WEIGHT: 235 LBS | TEMPERATURE: 99 F | DIASTOLIC BLOOD PRESSURE: 83 MMHG | BODY MASS INDEX: 31.14 KG/M2 | HEIGHT: 73 IN

## 2019-10-21 DIAGNOSIS — R06.2 WHEEZING: ICD-10-CM

## 2019-10-21 DIAGNOSIS — J40 BRONCHITIS: ICD-10-CM

## 2019-10-21 DIAGNOSIS — R50.9 FEVER, UNSPECIFIED FEVER CAUSE: Primary | ICD-10-CM

## 2019-10-21 LAB
CTP QC/QA: YES
FLUAV AG NPH QL: NEGATIVE
FLUBV AG NPH QL: NEGATIVE

## 2019-10-21 PROCEDURE — 96372 THER/PROPH/DIAG INJ SC/IM: CPT | Mod: S$GLB,,, | Performed by: NURSE PRACTITIONER

## 2019-10-21 PROCEDURE — 71046 PR XRAY, CHEST, 2 VIEWS: ICD-10-PCS | Mod: S$GLB,,, | Performed by: NURSE PRACTITIONER

## 2019-10-21 PROCEDURE — 96372 PR INJECTION,THERAP/PROPH/DIAG2ST, IM OR SUBCUT: ICD-10-PCS | Mod: S$GLB,,, | Performed by: NURSE PRACTITIONER

## 2019-10-21 PROCEDURE — 87804 INFLUENZA ASSAY W/OPTIC: CPT | Mod: QW,,, | Performed by: NURSE PRACTITIONER

## 2019-10-21 PROCEDURE — 99204 OFFICE O/P NEW MOD 45 MIN: CPT | Mod: 25,S$GLB,, | Performed by: NURSE PRACTITIONER

## 2019-10-21 PROCEDURE — 71046 X-RAY EXAM CHEST 2 VIEWS: CPT | Mod: S$GLB,,, | Performed by: NURSE PRACTITIONER

## 2019-10-21 PROCEDURE — 99204 PR OFFICE/OUTPT VISIT, NEW, LEVL IV, 45-59 MIN: ICD-10-PCS | Mod: 25,S$GLB,, | Performed by: NURSE PRACTITIONER

## 2019-10-21 PROCEDURE — 87804 POCT INFLUENZA A/B: ICD-10-PCS | Mod: 59,QW,, | Performed by: NURSE PRACTITIONER

## 2019-10-21 RX ORDER — IPRATROPIUM BROMIDE 0.5 MG/2.5ML
0.5 SOLUTION RESPIRATORY (INHALATION)
Status: COMPLETED | OUTPATIENT
Start: 2019-10-21 | End: 2019-10-21

## 2019-10-21 RX ORDER — ALBUTEROL SULFATE 90 UG/1
2 AEROSOL, METERED RESPIRATORY (INHALATION) EVERY 6 HOURS PRN
Qty: 18 G | Refills: 0 | Status: SHIPPED | OUTPATIENT
Start: 2019-10-21 | End: 2020-01-14 | Stop reason: SDUPTHER

## 2019-10-21 RX ORDER — AZITHROMYCIN 250 MG/1
TABLET, FILM COATED ORAL
Qty: 6 TABLET | Refills: 0 | Status: SHIPPED | OUTPATIENT
Start: 2019-10-21 | End: 2020-01-14

## 2019-10-21 RX ORDER — ALBUTEROL SULFATE 0.83 MG/ML
2.5 SOLUTION RESPIRATORY (INHALATION)
Status: COMPLETED | OUTPATIENT
Start: 2019-10-21 | End: 2019-10-21

## 2019-10-21 RX ORDER — ALBUTEROL SULFATE 0.83 MG/ML
2.5 SOLUTION RESPIRATORY (INHALATION) EVERY 6 HOURS PRN
Qty: 1 BOX | Refills: 0 | Status: SHIPPED | OUTPATIENT
Start: 2019-10-21 | End: 2020-01-14 | Stop reason: SDUPTHER

## 2019-10-21 RX ORDER — DEXAMETHASONE SODIUM PHOSPHATE 4 MG/ML
8 INJECTION, SOLUTION INTRA-ARTICULAR; INTRALESIONAL; INTRAMUSCULAR; INTRAVENOUS; SOFT TISSUE
Status: COMPLETED | OUTPATIENT
Start: 2019-10-21 | End: 2019-10-21

## 2019-10-21 RX ORDER — PREDNISONE 20 MG/1
40 TABLET ORAL DAILY
Qty: 10 TABLET | Refills: 0 | Status: SHIPPED | OUTPATIENT
Start: 2019-10-21 | End: 2019-10-26

## 2019-10-21 RX ADMIN — IPRATROPIUM BROMIDE 0.5 MG: 0.5 SOLUTION RESPIRATORY (INHALATION) at 01:10

## 2019-10-21 RX ADMIN — DEXAMETHASONE SODIUM PHOSPHATE 8 MG: 4 INJECTION, SOLUTION INTRA-ARTICULAR; INTRALESIONAL; INTRAMUSCULAR; INTRAVENOUS; SOFT TISSUE at 01:10

## 2019-10-21 RX ADMIN — ALBUTEROL SULFATE 2.5 MG: 0.83 SOLUTION RESPIRATORY (INHALATION) at 01:10

## 2019-10-21 NOTE — PROGRESS NOTES
"Subjective:       Patient ID: Gregorio Guerra is a 64 y.o. male.    Vitals:  height is 6' 1" (1.854 m) and weight is 106.6 kg (235 lb). His oral temperature is 98.5 °F (36.9 °C). His blood pressure is 121/83 and his pulse is 93. His oxygen saturation is 96%.     Chief Complaint: Sinus Problem    Patient complains of sinus congestion/pressure, cough, wheezing, shortness of breath and chills that began 2 days ago. Reports he is an everyday smoker. Denies chest pain, nausea, vomiting, diarrhea, dizziness, weakness.     Sinus Problem   This is a new problem. The current episode started in the past 7 days. The problem has been gradually worsening since onset. Associated symptoms include chills, congestion, coughing, a hoarse voice, shortness of breath, sinus pressure, sneezing and a sore throat. Pertinent negatives include no headaches. Past treatments include acetaminophen (Salt gargles, Nyquil, Zycam). The treatment provided no relief.       Constitution: Positive for chills. Negative for fatigue and fever.   HENT: Positive for congestion, sinus pressure and sore throat.    Neck: Negative for painful lymph nodes.   Cardiovascular: Negative for chest pain and leg swelling.   Eyes: Negative for double vision and blurred vision.   Respiratory: Positive for cough, shortness of breath and wheezing.    Gastrointestinal: Negative for abdominal pain, nausea, vomiting and diarrhea.   Genitourinary: Negative for dysuria, frequency and urgency.   Musculoskeletal: Negative for joint pain, joint swelling, muscle cramps and muscle ache.   Skin: Negative for color change, pale and rash.   Allergic/Immunologic: Positive for sneezing. Negative for seasonal allergies.   Neurological: Negative for dizziness, history of vertigo, light-headedness, passing out and headaches.   Hematologic/Lymphatic: Negative for swollen lymph nodes, easy bruising/bleeding and history of blood clots. Does not bruise/bleed easily. "   Psychiatric/Behavioral: Negative for nervous/anxious, sleep disturbance and depression. The patient is not nervous/anxious.        Objective:      Physical Exam   Constitutional: He is oriented to person, place, and time. Vital signs are normal. He appears well-developed and well-nourished. He is cooperative.  Non-toxic appearance. He does not have a sickly appearance. He does not appear ill. No distress.   HENT:   Head: Normocephalic and atraumatic.   Right Ear: Hearing, tympanic membrane, external ear and ear canal normal.   Left Ear: Hearing, tympanic membrane, external ear and ear canal normal.   Nose: No mucosal edema, rhinorrhea or nasal deformity. No epistaxis. Right sinus exhibits maxillary sinus tenderness and frontal sinus tenderness. Left sinus exhibits maxillary sinus tenderness and frontal sinus tenderness.   Mouth/Throat: Uvula is midline, oropharynx is clear and moist and mucous membranes are normal. No trismus in the jaw. Normal dentition. No uvula swelling. No posterior oropharyngeal erythema.   Eyes: Conjunctivae and lids are normal. Right eye exhibits no discharge. Left eye exhibits no discharge. No scleral icterus.   Neck: Trachea normal, normal range of motion, full passive range of motion without pain and phonation normal. Neck supple.   Cardiovascular: Normal rate, regular rhythm, normal heart sounds, intact distal pulses and normal pulses.   Pulmonary/Chest: Effort normal. No respiratory distress. He has wheezes in the right upper field, the right lower field, the left upper field and the left lower field.   Abdominal: Soft. Normal appearance and bowel sounds are normal. He exhibits no distension, no pulsatile midline mass and no mass. There is no tenderness.   Musculoskeletal: Normal range of motion. He exhibits no edema or deformity.   Lymphadenopathy:     He has no cervical adenopathy.   Neurological: He is alert and oriented to person, place, and time. He exhibits normal muscle tone.  Coordination normal. GCS eye subscore is 4. GCS verbal subscore is 5. GCS motor subscore is 6.   Skin: Skin is warm, dry, intact, not diaphoretic and not pale.   Psychiatric: He has a normal mood and affect. His speech is normal and behavior is normal. Judgment and thought content normal. Cognition and memory are normal.   Nursing note and vitals reviewed.        Assessment:       1. Fever, unspecified fever cause    2. Bronchitis    3. Wheezing        Plan:       Lungs still wheezing throughout, but much improved,  sats increased from 93% to 96% after breathing treatment , and no respiratory distress.  I do not suspect pneumonia, pulmonary embolism or any other more serious condition requiring further treatment. Advised pt to return to clinic or go to ER for any worsening of symptoms. Based on my clinical evaluation, I do not appreciate any immediate, emergent, or life threatening condition or etiology that warrants additional workup today and feel that the patient can be discharged with close follow up care. Discussed smoking cessation.     Fever, unspecified fever cause  -     POCT Influenza A/B  -     X-Ray Chest PA And Lateral; Future; Expected date: 10/21/2019    Bronchitis    Wheezing  -     NEBULIZER KIT (SUPPLIES) FOR HOME USE    Other orders  -     dexamethasone injection 8 mg  -     albuterol nebulizer solution 2.5 mg  -     ipratropium 0.02 % nebulizer solution 0.5 mg  -     azithromycin (ZITHROMAX) 250 MG tablet; Take 2 tablets (500 mg) on  Day 1,  followed by 1 tablet (250 mg) once daily on Days 2 through 5.  Dispense: 6 tablet; Refill: 0  -     dexchlorphen-phenylephrine-DM (POLYTUSSIN DM) 1-5-10 mg/5 mL Syrp; Take 5 mLs by mouth every 4 (four) hours as needed.  Dispense: 120 mL; Refill: 0  -     predniSONE (DELTASONE) 20 MG tablet; Take 2 tablets (40 mg total) by mouth once daily. for 5 days  Dispense: 10 tablet; Refill: 0  -     albuterol (PROVENTIL HFA) 90 mcg/actuation inhaler; Inhale 2 puffs into  the lungs every 6 (six) hours as needed for Wheezing. Rescue  Dispense: 18 g; Refill: 0  -     albuterol (PROVENTIL) 2.5 mg /3 mL (0.083 %) nebulizer solution; Take 3 mLs (2.5 mg total) by nebulization every 6 (six) hours as needed for Wheezing. Rescue  Dispense: 1 Box; Refill: 0

## 2019-10-21 NOTE — PATIENT INSTRUCTIONS
Bronchitis with Wheezing (Viral or Bacterial: Adult)    Bronchitis is an infection of the air passages. It often occurs during a cold and is usually caused by a virus. Symptoms include cough with mucus (phlegm) and low-grade fever. This illness is contagious during the first few days and is spread through the air by coughing and sneezing, or by direct contact (touching the sick person and then touching your own eyes, nose, or mouth).  If there is a lot of inflammation, air flow is restricted. The air passages may also go into spasm, especially if you have asthma. This causes wheezing and difficulty breathing even in people who do not have asthma.  Bronchitis usually lasts 7 to 14 days. The wheezing should improve with treatment during the first week. An inhaler is often prescribed to relax the air passages and stop wheezing. Antibiotics will be prescribed if your doctor thinks there is also a secondary bacterial infection.  Home care  · If symptoms are severe, rest at home for the first 2 to 3 days. When you go back to your usual activities, don't let yourself get too tired.  · Do not smoke. Also avoid being exposed to secondhand smoke.  · You may use over-the-counter medicine to control fever or pain, unless another medicine was prescribed. Note: If you have chronic liver or kidney disease or have ever had a stomach ulcer or gastrointestinal bleeding, talk with your healthcare provider before using these medicines. Also talk to your provider if you are taking medicine to prevent blood clots.) Aspirin should never be given to anyone younger than 18 years of age who is ill with a viral infection or fever. It may cause severe liver or brain damage.  · Your appetite may be poor, so a light diet is fine. Avoid dehydration by drinking 6 to 8 glasses of fluids per day (such as water, soft drinks, sports drinks, juices, tea, or soup). Extra fluids will help loosen secretions in the nose and lungs.  · Over-the-counter  cough, cold, and sore-throat medicines will not shorten the length of the illness, but they may be helpful to reduce symptoms. (Note: Do not use decongestants if you have high blood pressure.)  · If you were given an inhaler, use it exactly as directed. If you need to use it more often than prescribed, your condition may be worsening. If this happens, contact your healthcare provider.  · If prescribed, finish all antibiotic medicine, even if you are feeling better after only a few days.  Follow-up care  Follow up with your healthcare provider, or as advised. If you had an X-ray or ECG (electrocardiogram), a specialist will review it. You will be notified of any new findings that may affect your care.  Note: If you are age 65 or older, or if you have a chronic lung disease or condition that affects your immune system, or you smoke, talk to your healthcare provider about having a pneumococcal vaccinations and a yearly influenza vaccination (flu shot).  When to seek medical advice  Call your healthcare provider right away if any of these occur:  · Fever of 100.4°F (38°C) or higher  · Coughing up increasing amounts of colored sputum  · Weakness, drowsiness, headache, facial pain, ear pain, or a stiff neck  Call 911, or get immediate medical care  Contact emergency services right away if any of these occur.  · Coughing up blood  · Worsening weakness, drowsiness, headache, or stiff neck  · Increased wheezing not helped with medication, shortness of breath, or pain with breathing  Date Last Reviewed: 9/13/2015  © 0428-0444 [employer]. [address] [place] [address], [place] [address]. All rights reserved. This information is not intended as a substitute for professional medical care. Always follow your healthcare professional's instructions.        Fever Control (Adult)  A fever is a normal reaction of your body to an illness. The temperature itself usually isnt harmful.  It actually helps your body fight  infections. You dont need to treat a fever unless you feel very uncomfortable.   Home care  Follow these tips to take care of yourself at home:  · If you feel warm, check your temperature.  · Dress in light clothing. This will help you lose extra body heat through your skin. The fever will go up if you wear extra layers or wrap in blankets.  · Fever causes your body to lose water through evaporation. Drink plenty of fluids. These include water, juice, clear sodas, ginger ale, or lemonade.  Fever medicines  You can take acetaminophen every 4 to 6 hours if:  · You feel very uncomfortable  · Your oral temperature is 100.4ºF (38ºC) or higher  If you cant take or keep down oral medicine, ask your pharmacist for acetaminophen suppositories. You dont need a prescription for these.  If the fever doesnt get better within 1 hour after you take acetaminophen, take ibuprofen. If this works, keep taking the ibuprofen every 6 to 8 hours.  Note: If you have chronic liver or kidney disease, talk with your healthcare provider before taking these medicines. Also talk with your provider if you ever had a stomach ulcer or GI (gastrointestinal) bleeding.  If either medicine alone doesnt keep the fever down, you may switch off between the 2 medicines every 3 to 4 hours. But do this only if your healthcare provider has told you to. For example, take ibuprofen. Wait 3 hours. Then take acetaminophen. Wait 3 hours. Take ibuprofen, and so on. Follow your providers instructions exactly.  Note: Do not give aspirin to anyone younger than age 19 who is ill with a fever. Aspirin can cause serious side effects such as liver damage and Reye syndrome. Although rare, Reye syndrome is a very serious illness usually found in children younger than age 15. The syndrome is closely linked to the use of aspirin or aspirin-containing medicine during viral infection.  Follow-up care  Follow up with your healthcare provider if you don't get better after  48 hours.  When to seek medical advice  Call your healthcare provider right away if any of these occur:  · Fever, as directed by your healthcare provider, or:  ¨ Fever of 100.4°F (38°C) or above lasting for 24 to 48 hours  ¨ Fever lasting more than 3 days, even without other symptoms  ¨ Fever that happens after visiting a foreign country  ¨ Fever that happens within a month after visiting a country with malaria. Malaria is a serious illness. A fever can still be malaria even if you took medicine to prevent it. The medicine does not work in all cases.  · Confusion or trouble thinking  · Headache or stiff neck  · Flat, small, purplish red spots on your skin  · Low blood pressure  · Fast heart rate  · Fast (rapid) breathing  · You are pregnant  · You just had surgery, another medical procedure, or were just discharged from the hospital  · Use of medicines that suppress the immune system (immunosuppressants). These include Prednisone, cancer medicines, and organ transplant rejection medicines. If you are not sure about whether your medicines suppress your immune system, ask your healthcare provider.  Call 911  Someone should call 911 if you:  · Are having trouble breathing or shortness of breath  · Are unresponsive  Important reminder  Call your healthcare provider if you get a fever after visiting a place where infectious diseases are common. Many people  a cold or other virus while traveling. This usually goes away without a problem. But, some places have more serious diseases. Fever with certain other symptoms may mean you have a serious illness. Symptoms to watch for include diarrhea, skin rashes, insect bites, and skin boils, or infections. Your provider may ask you:  · What you did on your trip  · How long you were there  · Where you stayed (hotel, native house, tent)  · What you ate and drank  · If you were bitten by insects or other bugs  · If you swam in freshwater  · If you had sex or got a tattoo or  piercing while you were there  Check the Centers for Disease Control and Prevention to get more information about specific infectious diseases in the areas you have traveled.  Date Last Reviewed: 1/1/2017  © 8232-1469 [employer]. [address] [place] [address], [place] [address]. All rights reserved. This information is not intended as a substitute for professional medical care. Always follow your healthcare professional's instructions.

## 2020-01-14 ENCOUNTER — CLINICAL SUPPORT (OUTPATIENT)
Dept: URGENT CARE | Facility: CLINIC | Age: 65
End: 2020-01-14
Payer: COMMERCIAL

## 2020-01-14 VITALS
OXYGEN SATURATION: 95 % | DIASTOLIC BLOOD PRESSURE: 72 MMHG | HEIGHT: 73 IN | TEMPERATURE: 99 F | SYSTOLIC BLOOD PRESSURE: 116 MMHG | HEART RATE: 72 BPM | RESPIRATION RATE: 16 BRPM | BODY MASS INDEX: 31.81 KG/M2 | WEIGHT: 240 LBS

## 2020-01-14 DIAGNOSIS — R06.2 WHEEZING: ICD-10-CM

## 2020-01-14 DIAGNOSIS — J40 BRONCHITIS: Primary | ICD-10-CM

## 2020-01-14 PROCEDURE — 99214 PR OFFICE/OUTPT VISIT, EST, LEVL IV, 30-39 MIN: ICD-10-PCS | Mod: 25,S$GLB,, | Performed by: NURSE PRACTITIONER

## 2020-01-14 PROCEDURE — 96372 PR INJECTION,THERAP/PROPH/DIAG2ST, IM OR SUBCUT: ICD-10-PCS | Mod: S$GLB,,, | Performed by: NURSE PRACTITIONER

## 2020-01-14 PROCEDURE — 96372 THER/PROPH/DIAG INJ SC/IM: CPT | Mod: S$GLB,,, | Performed by: NURSE PRACTITIONER

## 2020-01-14 PROCEDURE — 99214 OFFICE O/P EST MOD 30 MIN: CPT | Mod: 25,S$GLB,, | Performed by: NURSE PRACTITIONER

## 2020-01-14 RX ORDER — FLUTICASONE PROPIONATE 50 MCG
2 SPRAY, SUSPENSION (ML) NASAL DAILY
Qty: 15.8 ML | Refills: 0 | Status: SHIPPED | OUTPATIENT
Start: 2020-01-14 | End: 2021-03-23

## 2020-01-14 RX ORDER — PREDNISONE 20 MG/1
40 TABLET ORAL DAILY
Qty: 10 TABLET | Refills: 0 | Status: SHIPPED | OUTPATIENT
Start: 2020-01-14 | End: 2020-01-19

## 2020-01-14 RX ORDER — CETIRIZINE HYDROCHLORIDE 10 MG/1
10 TABLET ORAL DAILY
Qty: 30 TABLET | Refills: 0 | Status: SHIPPED | OUTPATIENT
Start: 2020-01-14 | End: 2020-06-24

## 2020-01-14 RX ORDER — ALBUTEROL SULFATE 0.83 MG/ML
2.5 SOLUTION RESPIRATORY (INHALATION) EVERY 6 HOURS PRN
Qty: 1 BOX | Refills: 0 | Status: SHIPPED | OUTPATIENT
Start: 2020-01-14 | End: 2021-01-13

## 2020-01-14 RX ORDER — AZITHROMYCIN 250 MG/1
TABLET, FILM COATED ORAL
Qty: 6 TABLET | Refills: 0 | Status: SHIPPED | OUTPATIENT
Start: 2020-01-14 | End: 2020-06-24

## 2020-01-14 RX ORDER — DEXAMETHASONE SODIUM PHOSPHATE 4 MG/ML
8 INJECTION, SOLUTION INTRA-ARTICULAR; INTRALESIONAL; INTRAMUSCULAR; INTRAVENOUS; SOFT TISSUE
Status: COMPLETED | OUTPATIENT
Start: 2020-01-14 | End: 2020-01-14

## 2020-01-14 RX ORDER — ALBUTEROL SULFATE 90 UG/1
2 AEROSOL, METERED RESPIRATORY (INHALATION) EVERY 6 HOURS PRN
Qty: 18 G | Refills: 0 | Status: SHIPPED | OUTPATIENT
Start: 2020-01-14 | End: 2021-12-21

## 2020-01-14 RX ADMIN — DEXAMETHASONE SODIUM PHOSPHATE 8 MG: 4 INJECTION, SOLUTION INTRA-ARTICULAR; INTRALESIONAL; INTRAMUSCULAR; INTRAVENOUS; SOFT TISSUE at 05:01

## 2020-01-14 NOTE — PROGRESS NOTES
"Subjective:       Patient ID: Gregorio Guerra is a 64 y.o. male.    Vitals:  height is 6' 1" (1.854 m) and weight is 108.9 kg (240 lb). His oral temperature is 98.7 °F (37.1 °C). His blood pressure is 116/72 and his pulse is 72. His respiration is 16 and oxygen saturation is 95%.     Chief Complaint: Cough    Patient complains of cough, wheezing and shortness of breath for a few days. Denies fever, nausea, vomiting, diarrhea, chest pain. Patient reports a history of bronchitis. Reports he needs a refill on albuterol.     Cough   This is a new problem. The current episode started yesterday. The problem has been gradually worsening. The cough is productive of sputum. Associated symptoms include shortness of breath and wheezing. Pertinent negatives include no chest pain, chills, fever, headaches, myalgias, rash or sore throat. Associated symptoms comments: congestion. Treatments tried: Albuterol breathing tx. The treatment provided moderate relief. His past medical history is significant for bronchitis.       Constitution: Negative for chills, fatigue and fever.   HENT: Negative for congestion and sore throat.    Neck: Negative for painful lymph nodes.   Cardiovascular: Negative for chest pain and leg swelling.   Eyes: Negative for double vision and blurred vision.   Respiratory: Positive for cough, shortness of breath and wheezing.    Gastrointestinal: Negative for abdominal pain, nausea, vomiting and diarrhea.   Endocrine: negative.   Genitourinary: Negative for dysuria, frequency and urgency.   Musculoskeletal: Negative for joint pain, joint swelling, muscle cramps and muscle ache.   Skin: Negative for color change, pale and rash.   Allergic/Immunologic: Negative for seasonal allergies.   Neurological: Negative for dizziness, history of vertigo, light-headedness, passing out and headaches.   Hematologic/Lymphatic: Negative for swollen lymph nodes, easy bruising/bleeding and history of blood clots. Does not " bruise/bleed easily.   Psychiatric/Behavioral: Negative for nervous/anxious, sleep disturbance and depression. The patient is not nervous/anxious.        Objective:      Physical Exam   Constitutional: He is oriented to person, place, and time. Vital signs are normal. He appears well-developed and well-nourished. He is cooperative.  Non-toxic appearance. He does not have a sickly appearance. He does not appear ill. No distress.   HENT:   Head: Normocephalic and atraumatic.   Right Ear: Hearing, tympanic membrane, external ear and ear canal normal.   Left Ear: Hearing, tympanic membrane, external ear and ear canal normal.   Nose: Nose normal. No mucosal edema, rhinorrhea or nasal deformity. No epistaxis. Right sinus exhibits no maxillary sinus tenderness and no frontal sinus tenderness. Left sinus exhibits no maxillary sinus tenderness and no frontal sinus tenderness.   Mouth/Throat: Uvula is midline, oropharynx is clear and moist and mucous membranes are normal. No trismus in the jaw. Normal dentition. No uvula swelling. No posterior oropharyngeal erythema.   Eyes: Conjunctivae and lids are normal. Right eye exhibits no discharge. Left eye exhibits no discharge. No scleral icterus.   Neck: Trachea normal, normal range of motion, full passive range of motion without pain and phonation normal. Neck supple.   Cardiovascular: Normal rate, regular rhythm, normal heart sounds, intact distal pulses and normal pulses.   Pulmonary/Chest: Effort normal. No respiratory distress. He has wheezes in the right lower field and the left lower field.   Abdominal: Soft. Normal appearance and bowel sounds are normal. He exhibits no distension, no pulsatile midline mass and no mass. There is no tenderness.   Musculoskeletal: Normal range of motion. He exhibits no edema or deformity.   Neurological: He is alert and oriented to person, place, and time. He exhibits normal muscle tone. Coordination normal.   Skin: Skin is warm, dry, intact,  not diaphoretic and not pale.   Psychiatric: He has a normal mood and affect. His speech is normal and behavior is normal. Judgment and thought content normal. Cognition and memory are normal.   Nursing note and vitals reviewed.        Assessment:       1. Bronchitis    2. Wheezing        Plan:       Patient declined chest xray.     Lungs clear throughout, sats 95% , and no respiratory distress.  I do not suspect pneumonia, pulmonary embolism or any other more serious condition requiring further treatment. Advised pt to return to clinic or go to ER for any worsening of symptoms. Based on my clinical evaluation, I do not appreciate any immediate, emergent, or life threatening condition or etiology that warrants additional workup today and feel that the patient can be discharged with close follow up care.     Bronchitis    Wheezing    Other orders  -     dexamethasone injection 8 mg  -     cetirizine (ZYRTEC) 10 MG tablet; Take 1 tablet (10 mg total) by mouth once daily.  Dispense: 30 tablet; Refill: 0  -     predniSONE (DELTASONE) 20 MG tablet; Take 2 tablets (40 mg total) by mouth once daily. for 5 days  Dispense: 10 tablet; Refill: 0  -     fluticasone propionate (FLONASE) 50 mcg/actuation nasal spray; 2 sprays (100 mcg total) by Each Nostril route once daily.  Dispense: 15.8 mL; Refill: 0  -     azithromycin (ZITHROMAX) 250 MG tablet; Take 2 tablets (500 mg) on  Day 1,  followed by 1 tablet (250 mg) once daily on Days 2 through 5.  Dispense: 6 tablet; Refill: 0  -     dexchlorphen-phenylephrine-DM (POLYTUSSIN DM) 1-5-10 mg/5 mL Syrp; Take 5 mLs by mouth every 4 (four) hours as needed.  Dispense: 120 mL; Refill: 0  -     albuterol (PROVENTIL) 2.5 mg /3 mL (0.083 %) nebulizer solution; Take 3 mLs (2.5 mg total) by nebulization every 6 (six) hours as needed for Wheezing. Rescue  Dispense: 1 Box; Refill: 0  -     albuterol (PROVENTIL HFA) 90 mcg/actuation inhaler; Inhale 2 puffs into the lungs every 6 (six) hours as  needed for Wheezing. Rescue  Dispense: 18 g; Refill: 0

## 2020-01-14 NOTE — PATIENT INSTRUCTIONS
Bronchitis with Wheezing (Viral or Bacterial: Adult)    Bronchitis is an infection of the air passages. It often occurs during a cold and is usually caused by a virus. Symptoms include cough with mucus (phlegm) and low-grade fever. This illness is contagious during the first few days and is spread through the air by coughing and sneezing, or by direct contact (touching the sick person and then touching your own eyes, nose, or mouth).  If there is a lot of inflammation, air flow is restricted. The air passages may also go into spasm, especially if you have asthma. This causes wheezing and difficulty breathing even in people who do not have asthma.  Bronchitis usually lasts 7 to 14 days. The wheezing should improve with treatment during the first week. An inhaler is often prescribed to relax the air passages and stop wheezing. Antibiotics will be prescribed if your doctor thinks there is also a secondary bacterial infection.  Home care  · If symptoms are severe, rest at home for the first 2 to 3 days. When you go back to your usual activities, don't let yourself get too tired.  · Do not smoke. Also avoid being exposed to secondhand smoke.  · You may use over-the-counter medicine to control fever or pain, unless another medicine was prescribed. Note: If you have chronic liver or kidney disease or have ever had a stomach ulcer or gastrointestinal bleeding, talk with your healthcare provider before using these medicines. Also talk to your provider if you are taking medicine to prevent blood clots.) Aspirin should never be given to anyone younger than 18 years of age who is ill with a viral infection or fever. It may cause severe liver or brain damage.  · Your appetite may be poor, so a light diet is fine. Avoid dehydration by drinking 6 to 8 glasses of fluids per day (such as water, soft drinks, sports drinks, juices, tea, or soup). Extra fluids will help loosen secretions in the nose and lungs.  · Over-the-counter  cough, cold, and sore-throat medicines will not shorten the length of the illness, but they may be helpful to reduce symptoms. (Note: Do not use decongestants if you have high blood pressure.)  · If you were given an inhaler, use it exactly as directed. If you need to use it more often than prescribed, your condition may be worsening. If this happens, contact your healthcare provider.  · If prescribed, finish all antibiotic medicine, even if you are feeling better after only a few days.  Follow-up care  Follow up with your healthcare provider, or as advised. If you had an X-ray or ECG (electrocardiogram), a specialist will review it. You will be notified of any new findings that may affect your care.  Note: If you are age 65 or older, or if you have a chronic lung disease or condition that affects your immune system, or you smoke, talk to your healthcare provider about having a pneumococcal vaccinations and a yearly influenza vaccination (flu shot).  When to seek medical advice  Call your healthcare provider right away if any of these occur:  · Fever of 100.4°F (38°C) or higher  · Coughing up increasing amounts of colored sputum  · Weakness, drowsiness, headache, facial pain, ear pain, or a stiff neck  Call 911, or get immediate medical care  Contact emergency services right away if any of these occur.  · Coughing up blood  · Worsening weakness, drowsiness, headache, or stiff neck  · Increased wheezing not helped with medication, shortness of breath, or pain with breathing  Date Last Reviewed: 9/13/2015  © 8227-0038 [employer]. [address] [place] [address], [place] [address]. All rights reserved. This information is not intended as a substitute for professional medical care. Always follow your healthcare professional's instructions.

## 2020-02-28 ENCOUNTER — CLINICAL SUPPORT (OUTPATIENT)
Dept: URGENT CARE | Facility: CLINIC | Age: 65
End: 2020-02-28
Payer: COMMERCIAL

## 2020-02-28 VITALS
DIASTOLIC BLOOD PRESSURE: 77 MMHG | RESPIRATION RATE: 16 BRPM | OXYGEN SATURATION: 99 % | TEMPERATURE: 97 F | WEIGHT: 240 LBS | HEART RATE: 59 BPM | SYSTOLIC BLOOD PRESSURE: 113 MMHG | BODY MASS INDEX: 31.66 KG/M2

## 2020-02-28 DIAGNOSIS — R05.9 COUGH: ICD-10-CM

## 2020-02-28 DIAGNOSIS — J40 BRONCHITIS: Primary | ICD-10-CM

## 2020-02-28 PROCEDURE — 99214 PR OFFICE/OUTPT VISIT, EST, LEVL IV, 30-39 MIN: ICD-10-PCS | Mod: 25,S$GLB,, | Performed by: NURSE PRACTITIONER

## 2020-02-28 PROCEDURE — 71046 PR XRAY, CHEST, 2 VIEWS: ICD-10-PCS | Mod: S$GLB,,, | Performed by: NURSE PRACTITIONER

## 2020-02-28 PROCEDURE — 71046 X-RAY EXAM CHEST 2 VIEWS: CPT | Mod: S$GLB,,, | Performed by: NURSE PRACTITIONER

## 2020-02-28 PROCEDURE — 99214 OFFICE O/P EST MOD 30 MIN: CPT | Mod: 25,S$GLB,, | Performed by: NURSE PRACTITIONER

## 2020-02-28 RX ORDER — IPRATROPIUM BROMIDE 0.5 MG/2.5ML
500 SOLUTION RESPIRATORY (INHALATION) 4 TIMES DAILY
Qty: 1 BOX | Refills: 0 | Status: SHIPPED | OUTPATIENT
Start: 2020-02-28 | End: 2020-06-24

## 2020-02-28 RX ORDER — DOXYCYCLINE 100 MG/1
100 CAPSULE ORAL 2 TIMES DAILY
Qty: 20 CAPSULE | Refills: 0 | Status: SHIPPED | OUTPATIENT
Start: 2020-02-28 | End: 2020-03-09

## 2020-02-28 RX ORDER — ALBUTEROL SULFATE 0.83 MG/ML
2.5 SOLUTION RESPIRATORY (INHALATION)
Status: DISCONTINUED | OUTPATIENT
Start: 2020-02-28 | End: 2021-06-16 | Stop reason: HOSPADM

## 2020-02-28 RX ORDER — IPRATROPIUM BROMIDE 0.5 MG/2.5ML
0.5 SOLUTION RESPIRATORY (INHALATION)
Status: DISCONTINUED | OUTPATIENT
Start: 2020-02-28 | End: 2024-03-01

## 2020-02-28 NOTE — PATIENT INSTRUCTIONS
Bronchitis, Antibiotic Treatment (Adult)    Bronchitis is an infection of the air passages (bronchial tubes) in your lungs. It often occurs when you have a cold. This illness is contagious during the first few days and is spread through the air by coughing and sneezing, or by direct contact (touching the sick person and then touching your own eyes, nose, or mouth).  Symptoms of bronchitis include cough with mucus (phlegm) and low-grade fever. Bronchitis usually lasts 7 to 14 days. Mild cases can be treated with simple home remedies. More severe infection is treated with an antibiotic.  Home care  Follow these guidelines when caring for yourself at home:  · If your symptoms are severe, rest at home for the first 2 to 3 days. When you go back to your usual activities, don't let yourself get too tired.  · Do not smoke. Also avoid being exposed to secondhand smoke.  · You may use over-the-counter medicines to control fever or pain, unless another medicine was prescribed. (Note: If you have chronic liver or kidney disease or have ever had a stomach ulcer or gastrointestinal bleeding, talk with your healthcare provider before using these medicines. Also talk to your provider if you are taking medicine to prevent blood clots.) Aspirin should never be given to anyone younger than 18 years of age who is ill with a viral infection or fever. It may cause severe liver or brain damage.  · Your appetite may be poor, so a light diet is fine. Avoid dehydration by drinking 6 to 8 glasses of fluids per day (such as water, soft drinks, sports drinks, juices, tea, or soup). Extra fluids will help loosen secretions in the nose and lungs.  · Over-the-counter cough, cold, and sore-throat medicines will not shorten the length of the illness, but they may be helpful to reduce symptoms. (Note: Do not use decongestants if you have high blood pressure.)  · Finish all antibiotic medicine. Do this even if you are feeling better after only a  few days.  Follow-up care  Follow up with your healthcare provider, or as advised. If you had an X-ray or ECG (electrocardiogram), a specialist will review it. You will be notified of any new findings that may affect your care.  Note: If you are age 65 or older, or if you have a chronic lung disease or condition that affects your immune system, or you smoke, talk to your healthcare provider about having pneumococcal vaccinations and a yearly influenza vaccination (flu shot).  When to seek medical advice  Call your healthcare provider right away if any of these occur:  · Fever of 100.4°F (38°C) or higher  · Coughing up increased amounts of colored sputum  · Weakness, drowsiness, headache, facial pain, ear pain, or a stiff neck  Call 911, or get immediate medical care  Contact emergency services right away if any of these occur.  · Coughing up blood  · Worsening weakness, drowsiness, headache, or stiff neck  · Trouble breathing, wheezing, or pain with breathing  Date Last Reviewed: 9/13/2015  © 5434-0785 [employer]. [address] [place] [address], [place] [address]. All rights reserved. This information is not intended as a substitute for professional medical care. Always follow your healthcare professional's instructions.

## 2020-02-28 NOTE — PROGRESS NOTES
Subjective: cough, wheezing, congestion x 3 days       Patient ID: Gregorio Guerra is a 64 y.o. male.    Vitals:  weight is 108.9 kg (240 lb). His temperature is 97.4 °F (36.3 °C). His blood pressure is 113/77 and his pulse is 59 (abnormal). His respiration is 16 and oxygen saturation is 99%.     Chief Complaint: Cough (cough, wheezing,congestion)    Cough   This is a new problem. The current episode started in the past 7 days. Associated symptoms include postnasal drip, a sore throat and wheezing. Pertinent negatives include no chest pain, chills, fever, headaches, myalgias, rash or shortness of breath.       Constitution: Positive for fatigue. Negative for chills and fever.   HENT: Positive for congestion, postnasal drip, sinus pain, sinus pressure and sore throat.    Neck: Negative for painful lymph nodes.   Cardiovascular: Negative for chest pain and leg swelling.   Eyes: Negative for double vision and blurred vision.   Respiratory: Positive for cough and wheezing. Negative for shortness of breath.    Gastrointestinal: Negative for nausea, vomiting and diarrhea.   Genitourinary: Negative for dysuria, frequency and urgency.   Musculoskeletal: Negative for joint pain, joint swelling, muscle cramps and muscle ache.   Skin: Negative for color change, pale and rash.   Allergic/Immunologic: Negative for seasonal allergies.   Neurological: Negative for dizziness, history of vertigo, light-headedness, passing out and headaches.   Hematologic/Lymphatic: Negative for swollen lymph nodes, easy bruising/bleeding and history of blood clots. Does not bruise/bleed easily.   Psychiatric/Behavioral: Negative for nervous/anxious, sleep disturbance and depression. The patient is not nervous/anxious.        Objective:      Physical Exam   Constitutional: He is oriented to person, place, and time. He appears well-developed and well-nourished. He is cooperative.  Non-toxic appearance. He does not appear ill. No distress.   HENT:    Head: Normocephalic and atraumatic.   Right Ear: Hearing, external ear and ear canal normal. A middle ear effusion is present.   Left Ear: Hearing, external ear and ear canal normal. A middle ear effusion is present.   Nose: Mucosal edema and rhinorrhea present. No nasal deformity. No epistaxis. Right sinus exhibits no maxillary sinus tenderness and no frontal sinus tenderness. Left sinus exhibits no maxillary sinus tenderness and no frontal sinus tenderness.   Mouth/Throat: Uvula is midline and mucous membranes are normal. No trismus in the jaw. Normal dentition. No uvula swelling. Posterior oropharyngeal erythema and cobblestoning present.   Eyes: Conjunctivae and lids are normal. Right eye exhibits no discharge. Left eye exhibits no discharge. No scleral icterus.   Neck: Trachea normal, normal range of motion, full passive range of motion without pain and phonation normal. Neck supple.   Cardiovascular: Normal rate, regular rhythm, normal heart sounds, intact distal pulses and normal pulses.   Pulmonary/Chest: Effort normal. No respiratory distress. He has rales (to all lung fields on expiration).   Abdominal: Soft. Normal appearance and bowel sounds are normal. He exhibits no distension, no pulsatile midline mass and no mass. There is no tenderness.   Musculoskeletal: Normal range of motion. He exhibits no edema or deformity.   Neurological: He is alert and oriented to person, place, and time. He exhibits normal muscle tone. Coordination normal.   Skin: Skin is warm, dry, intact, not diaphoretic and not pale.   Psychiatric: He has a normal mood and affect. His speech is normal and behavior is normal. Judgment and thought content normal. Cognition and memory are normal.   Nursing note and vitals reviewed.        Assessment:       1. Bronchitis    2. Cough        Plan:     cxr reviewed by me, no pneumonia noted. +increased hilar markings, no indication of CHF  BBS CTA after treatment, pt states hes feeling  better    Bronchitis    Cough  -     X-Ray Chest PA And Lateral; Future; Expected date: 02/28/2020    Other orders  -     albuterol nebulizer solution 2.5 mg  -     ipratropium 0.02 % nebulizer solution 0.5 mg  -     ipratropium (ATROVENT) 0.02 % nebulizer solution; Take 2.5 mLs (500 mcg total) by nebulization 4 (four) times daily. Rescue  Dispense: 1 Box; Refill: 0  -     dexchlorphen-phenylephrine-DM 1-5-10 mg/5 mL Syrp; Take 5 mLs by mouth every 4 (four) hours as needed.  Dispense: 480 mL; Refill: 0  -     doxycycline (MONODOX) 100 MG capsule; Take 1 capsule (100 mg total) by mouth 2 (two) times daily. for 10 days  Dispense: 20 capsule; Refill: 0

## 2020-06-24 ENCOUNTER — HOSPITAL ENCOUNTER (OUTPATIENT)
Dept: PREADMISSION TESTING | Facility: HOSPITAL | Age: 65
Discharge: HOME | End: 2020-06-24
Attending: INTERNAL MEDICINE
Payer: COMMERCIAL

## 2020-06-24 VITALS — BODY MASS INDEX: 31.81 KG/M2 | HEIGHT: 73 IN | WEIGHT: 240 LBS

## 2020-06-24 DIAGNOSIS — Z01.810 PREOP CARDIOVASCULAR EXAM: ICD-10-CM

## 2020-06-24 DIAGNOSIS — Z01.810 PREOPERATIVE CARDIOVASCULAR EXAMINATION: Primary | ICD-10-CM

## 2020-06-24 LAB
ALBUMIN SERPL BCP-MCNC: 4.3 G/DL (ref 3.5–5.2)
ALP SERPL-CCNC: 76 U/L (ref 55–135)
ALT SERPL W/O P-5'-P-CCNC: 27 U/L (ref 10–44)
ANION GAP SERPL CALC-SCNC: 9 MMOL/L (ref 8–16)
APTT PPP: 34.9 SEC (ref 23.6–33.3)
AST SERPL-CCNC: 26 U/L (ref 10–40)
BASOPHILS # BLD AUTO: 0.08 K/UL (ref 0–0.2)
BASOPHILS NFR BLD: 1.1 % (ref 0–1.9)
BILIRUB SERPL-MCNC: 0.9 MG/DL (ref 0.1–1)
BUN SERPL-MCNC: 18 MG/DL (ref 8–23)
CALCIUM SERPL-MCNC: 9.2 MG/DL (ref 8.7–10.5)
CHLORIDE SERPL-SCNC: 105 MMOL/L (ref 95–110)
CO2 SERPL-SCNC: 25 MMOL/L (ref 23–29)
CREAT SERPL-MCNC: 1.1 MG/DL (ref 0.5–1.4)
DIFFERENTIAL METHOD: ABNORMAL
EOSINOPHIL # BLD AUTO: 0.2 K/UL (ref 0–0.5)
EOSINOPHIL NFR BLD: 2.5 % (ref 0–8)
ERYTHROCYTE [DISTWIDTH] IN BLOOD BY AUTOMATED COUNT: 12.7 % (ref 11.5–14.5)
EST. GFR  (AFRICAN AMERICAN): >60 ML/MIN/1.73 M^2
EST. GFR  (NON AFRICAN AMERICAN): >60 ML/MIN/1.73 M^2
GLUCOSE SERPL-MCNC: 116 MG/DL (ref 70–110)
HCT VFR BLD AUTO: 46.7 % (ref 40–54)
HGB BLD-MCNC: 16 G/DL (ref 14–18)
IMM GRANULOCYTES # BLD AUTO: 0.02 K/UL (ref 0–0.04)
IMM GRANULOCYTES NFR BLD AUTO: 0.3 % (ref 0–0.5)
INR PPP: 1.2
LYMPHOCYTES # BLD AUTO: 2.1 K/UL (ref 1–4.8)
LYMPHOCYTES NFR BLD: 27.9 % (ref 18–48)
MCH RBC QN AUTO: 33.8 PG (ref 27–31)
MCHC RBC AUTO-ENTMCNC: 34.3 G/DL (ref 32–36)
MCV RBC AUTO: 99 FL (ref 82–98)
MONOCYTES # BLD AUTO: 0.7 K/UL (ref 0.3–1)
MONOCYTES NFR BLD: 9.3 % (ref 4–15)
NEUTROPHILS # BLD AUTO: 4.4 K/UL (ref 1.8–7.7)
NEUTROPHILS NFR BLD: 58.9 % (ref 38–73)
NRBC BLD-RTO: 0 /100 WBC
PLATELET # BLD AUTO: 141 K/UL (ref 150–350)
PMV BLD AUTO: 12.5 FL (ref 9.2–12.9)
POTASSIUM SERPL-SCNC: 3.9 MMOL/L (ref 3.5–5.1)
PROT SERPL-MCNC: 7.6 G/DL (ref 6–8.4)
PROTHROMBIN TIME: 14.6 SEC (ref 10.6–14.8)
RBC # BLD AUTO: 4.74 M/UL (ref 4.6–6.2)
SODIUM SERPL-SCNC: 139 MMOL/L (ref 136–145)
WBC # BLD AUTO: 7.5 K/UL (ref 3.9–12.7)

## 2020-06-24 PROCEDURE — 36415 COLL VENOUS BLD VENIPUNCTURE: CPT

## 2020-06-24 PROCEDURE — U0003 INFECTIOUS AGENT DETECTION BY NUCLEIC ACID (DNA OR RNA); SEVERE ACUTE RESPIRATORY SYNDROME CORONAVIRUS 2 (SARS-COV-2) (CORONAVIRUS DISEASE [COVID-19]), AMPLIFIED PROBE TECHNIQUE, MAKING USE OF HIGH THROUGHPUT TECHNOLOGIES AS DESCRIBED BY CMS-2020-01-R: HCPCS

## 2020-06-24 PROCEDURE — 85025 COMPLETE CBC W/AUTO DIFF WBC: CPT

## 2020-06-24 PROCEDURE — 85610 PROTHROMBIN TIME: CPT

## 2020-06-24 PROCEDURE — 80053 COMPREHEN METABOLIC PANEL: CPT

## 2020-06-24 PROCEDURE — 85730 THROMBOPLASTIN TIME PARTIAL: CPT

## 2020-06-26 ENCOUNTER — CLINICAL SUPPORT (OUTPATIENT)
Dept: CARDIOLOGY | Facility: HOSPITAL | Age: 65
End: 2020-06-26
Attending: INTERNAL MEDICINE
Payer: COMMERCIAL

## 2020-06-26 ENCOUNTER — ANESTHESIA (OUTPATIENT)
Dept: CARDIOLOGY | Facility: HOSPITAL | Age: 65
End: 2020-06-26
Payer: COMMERCIAL

## 2020-06-26 ENCOUNTER — ANESTHESIA EVENT (OUTPATIENT)
Dept: CARDIOLOGY | Facility: HOSPITAL | Age: 65
End: 2020-06-26
Payer: COMMERCIAL

## 2020-06-26 ENCOUNTER — HOSPITAL ENCOUNTER (OUTPATIENT)
Facility: HOSPITAL | Age: 65
Discharge: HOME | End: 2020-06-26
Attending: INTERNAL MEDICINE | Admitting: INTERNAL MEDICINE
Payer: COMMERCIAL

## 2020-06-26 VITALS
OXYGEN SATURATION: 95 % | TEMPERATURE: 98 F | SYSTOLIC BLOOD PRESSURE: 108 MMHG | DIASTOLIC BLOOD PRESSURE: 67 MMHG | RESPIRATION RATE: 20 BRPM | HEART RATE: 67 BPM

## 2020-06-26 DIAGNOSIS — I48.91 ATRIAL FIBRILLATION: ICD-10-CM

## 2020-06-26 DIAGNOSIS — I48.91 A-FIB: ICD-10-CM

## 2020-06-26 DIAGNOSIS — I48.0 PAROXYSMAL A-FIB: ICD-10-CM

## 2020-06-26 LAB — SARS-COV-2 RNA RESP QL NAA+PROBE: NOT DETECTED

## 2020-06-26 PROCEDURE — 25000003 PHARM REV CODE 250: Performed by: NURSE ANESTHETIST, CERTIFIED REGISTERED

## 2020-06-26 PROCEDURE — 37000008 HC ANESTHESIA 1ST 15 MINUTES: Performed by: INTERNAL MEDICINE

## 2020-06-26 PROCEDURE — 63600175 PHARM REV CODE 636 W HCPCS: Performed by: NURSE ANESTHETIST, CERTIFIED REGISTERED

## 2020-06-26 PROCEDURE — 93321 DOPPLER ECHO F-UP/LMTD STD: CPT

## 2020-06-26 PROCEDURE — 27000675 HC TUBING MICRODRIP: Performed by: ANESTHESIOLOGY

## 2020-06-26 PROCEDURE — 27202103: Performed by: ANESTHESIOLOGY

## 2020-06-26 PROCEDURE — 93005 ELECTROCARDIOGRAM TRACING: CPT | Mod: 59 | Performed by: INTERNAL MEDICINE

## 2020-06-26 RX ORDER — PROPOFOL 10 MG/ML
INJECTION, EMULSION INTRAVENOUS
Status: DISCONTINUED | OUTPATIENT
Start: 2020-06-26 | End: 2020-06-26

## 2020-06-26 RX ORDER — SODIUM CHLORIDE 9 MG/ML
INJECTION, SOLUTION INTRAVENOUS CONTINUOUS PRN
Status: DISCONTINUED | OUTPATIENT
Start: 2020-06-26 | End: 2020-06-26

## 2020-06-26 RX ADMIN — PROPOFOL 100 MG: 10 INJECTION, EMULSION INTRAVENOUS at 09:06

## 2020-06-26 RX ADMIN — PROPOFOL 50 MG: 10 INJECTION, EMULSION INTRAVENOUS at 09:06

## 2020-06-26 RX ADMIN — SODIUM CHLORIDE: 9 INJECTION, SOLUTION INTRAVENOUS at 08:06

## 2020-06-26 NOTE — ANESTHESIA POSTPROCEDURE EVALUATION
Anesthesia Post Evaluation    Patient: Gregorio Guerra    Procedure(s) Performed: Procedure(s) (LRB):  ECHOCARDIOGRAM,TRANSESOPHAGEAL (N/A)  CARDIOVERSION (N/A)    Final Anesthesia Type: general    Patient location: [facility].  Patient participation: Yes- Able to Participate  Level of consciousness: awake and alert  Post-procedure vital signs: reviewed and stable  Pain management: adequate  Airway patency: patent    PONV status at discharge: No PONV  Anesthetic complications: no      Cardiovascular status: blood pressure returned to baseline and stable  Respiratory status: unassisted and room air  Hydration status: euvolemic  Follow-up not needed.  Comments: Converted after one shock. Moves all extremities.  Reports feeling much better now.          Vitals Value Taken Time   /59 06/26/20 0915   Temp 97.5 06/26/20 0915   Pulse 73 06/26/20 0913   Resp 65 06/26/20 0913   SpO2 96 % 06/26/20 0913   Vitals shown include unvalidated device data.      No case tracking events are documented in the log.      Pain/Jena Score: Jena Score: 10 (6/26/2020  9:12 AM)

## 2020-06-26 NOTE — DISCHARGE INSTRUCTIONS
SAL and Cardioversion Discharge Instructions:   Start with soft foods, once you can tolerate soft foods easily, you may begin eating solid foods.    Ask your physician when you can return to your normal activities   Do not drive for at least 24 hours    SEEK CARE IMMEDIATELY IF:   You feel your heart beating fast or fluttering   You feel weak or faint   Your leg or arm is larger than usual, painful, or warm      CALL YOUR DOCTOR IMMEDIATELY IF:   You have fever or chills   You taste blood in your mouth   You have severe sore throat   You have difficulty swallowing   Your skin is itchy, swollen, or if you have a rash    You have questions or concerns about your condition or care.

## 2020-06-26 NOTE — TRANSFER OF CARE
Anesthesia Transfer of Care Note    Patient: Gregorio Guerra    Procedure(s) Performed: Procedure(s) (LRB):  ECHOCARDIOGRAM,TRANSESOPHAGEAL (N/A)  CARDIOVERSION (N/A)    Patient location: PACU    Anesthesia Type: general    Transport from OR: Transported from OR on room air with adequate spontaneous ventilation    Post pain: adequate analgesia    Post assessment: no apparent anesthetic complications    Post vital signs: stable    Level of consciousness: awake and alert    Nausea/Vomiting: no nausea/vomiting    Complications: none    Transfer of care protocol was followed      Last vitals:   Visit Vitals  /84   Pulse 81   Resp (!) 68   SpO2 95%

## 2020-06-26 NOTE — ANESTHESIA PREPROCEDURE EVALUATION
06/26/2020  Gregorio Guerra is a 65 y.o., male.    Patient Active Problem List   Diagnosis    Primary osteoarthritis of right hip    History of total right hip replacement    Postoperative state    Chronic cough    Current use of long term anticoagulation    Bilateral lower extremity edema    Paroxysmal atrial fibrillation    Current every day smoker    Congestive heart failure (CHF)    Paroxysmal A-fib       Past Surgical History:   Procedure Laterality Date    CARDIAC SURGERY      Correct abnormal heart rhythm    EXPLORATORY LAPAROTOMY  1986    KNEE SURGERY      TOTAL HIP ARTHROPLASTY Right 10/29/2018    anterior approach (depuy hardware)    TOTAL HIP ARTHROPLASTY Right         Tobacco Use:  The patient  reports that he has been smoking cigarettes. He has been smoking about 0.50 packs per day. He has never used smokeless tobacco.     No results found for this or any previous visit.          Lab Results   Component Value Date    WBC 7.50 06/24/2020    HGB 16.0 06/24/2020    HCT 46.7 06/24/2020    MCV 99 (H) 06/24/2020     (L) 06/24/2020     BMP  Lab Results   Component Value Date     06/24/2020    K 3.9 06/24/2020     06/24/2020    CO2 25 06/24/2020    BUN 18 06/24/2020    CREATININE 1.1 06/24/2020    CALCIUM 9.2 06/24/2020    ANIONGAP 9 06/24/2020     (H) 06/24/2020    GLU 98 10/17/2018    GLU 90 08/16/2005       No results found for this or any previous visit.        Anesthesia Evaluation    I have reviewed the Patient Summary Reports.    I have reviewed the Nursing Notes.    I have reviewed the Medications.     Review of Systems  Anesthesia Hx:  No problems with previous Anesthesia  Denies Family Hx of Anesthesia complications.   Denies Personal Hx of Anesthesia complications.   Social:  Non-Smoker    Hematology/Oncology:  Hematology Normal         EENT/Dental:EENT/Dental Normal   Cardiovascular:  Cardiovascular Normal Hypertension  CHF  Disorder of Cardiac Rhythm (paroxysmal atrial fibrillation)    Pulmonary:  Pulmonary Normal    Renal/:  Renal/ Normal     Hepatic/GI:  Hepatic/GI Normal    Musculoskeletal:   Arthritis (right hip)     Neurological:  Neurology Normal    Endocrine:  Endocrine Normal    Psych:  Psychiatric Normal           Physical Exam  General:  Well nourished    Airway/Jaw/Neck:  Airway Findings: Mouth Opening: Normal Tongue: Normal  General Airway Assessment: Adult  Mallampati: II  TM Distance: Normal, at least 6 cm  Jaw/Neck Findings:  Neck ROM: Normal ROM     Eyes/Ears/Nose:  Eyes/Ears/Nose Findings:    Dental:  Dental Findings: In tact   Chest/Lungs:  Chest/Lungs Findings: Clear to auscultation, Normal Respiratory Rate     Heart/Vascular:  Heart Findings: Rate: Normal  Rhythm: Irregularly Irregular  Sounds: Normal     Abdomen:  Abdomen Findings:     Musculoskeletal:  Musculoskeletal Findings:    Skin:  Skin Findings:     Mental Status:  Mental Status Findings:  Cooperative, Alert and Oriented         Anesthesia Plan  Type of Anesthesia, risks & benefits discussed:  Anesthesia Type:  MAC  Patient's Preference: MAC  Intra-op Monitoring Plan: standard ASA monitors  Intra-op Monitoring Plan Comments:   Post Op Pain Control Plan: IV/PO Opioids PRN  Post Op Pain Control Plan Comments:   Induction:   IV  Beta Blocker:         Informed Consent: Patient understands risks and agrees with Anesthesia plan.  Questions answered. Anesthesia consent signed with patient.  ASA Score: 3     Day of Surgery Review of History & Physical:    H&P update referred to the provider.         Ready For Surgery From Anesthesia Perspective.

## 2021-01-11 RX ORDER — FUROSEMIDE 20 MG/1
20 TABLET ORAL 2 TIMES DAILY PRN
Qty: 180 TABLET | Refills: 3 | Status: SHIPPED | OUTPATIENT
Start: 2021-01-11 | End: 2021-01-20 | Stop reason: SDUPTHER

## 2021-01-11 NOTE — TELEPHONE ENCOUNTER
----- Message from Santa Villavicencio, Patient Care Assistant sent at 1/11/2021  1:49 PM CST -----  Regarding: refill  Contact: pt  Type:  RX Refill Request    Who Called:  pt   Refill or New Rx:  refill   RX Name and Strength:  furosemide (LASIX) 20 MG tablet  How is the patient currently taking it? 1Xday   Is this a 30 day or 90 day RX:  30  [facility] with phone number[facility]    [facility] [address] [place] [place] [facility] [address] [facility] [address] [address]  Phone: [phone] Fax: [phone]  Local or Mail Order:  local   Ordering Provider:  Dr. hutchison  Best Call Back Number:  [phone] (home) [phone] (work)  Additional Information:  please call pt to advise. Thanks!

## 2021-03-08 ENCOUNTER — TELEPHONE (OUTPATIENT)
Dept: CARDIOLOGY | Facility: CLINIC | Age: 66
End: 2021-03-08

## 2021-03-11 RX ORDER — SPIRONOLACTONE 25 MG/1
25 TABLET ORAL DAILY
Qty: 90 TABLET | Refills: 3 | Status: SHIPPED | OUTPATIENT
Start: 2021-03-11 | End: 2022-03-25

## 2021-03-11 NOTE — TELEPHONE ENCOUNTER
----- Message from Rula Agustin sent at 3/11/2021  2:30 PM CST -----  Regarding: Sooner appt  Contact: Patient  Type: Sooner Apt Request:    Caller is requesting a sooner appt.  Caller declined first available listed below.  Caller will not accept being placed on the wait list and are requesting a message be sent to the doctor.    Name of Caller:Patient  When is the 1st available appt:07/21  Symptoms:Check up  Best Call Back #:[phone] (home) [phone] (work)  Additional Info-Please advise-Thank you~

## 2021-03-11 NOTE — TELEPHONE ENCOUNTER
----- Message from Rula Agustin sent at 3/11/2021  2:28 PM CST -----  Regarding: Refill  Contact: Patient  Type: RX Refill Request:    Who Called:  Patient  Refill or New Rx:  Refill  RX Name and Strength:  spironolactone (ALDACTONE) 25 MG tablet  How is the patient currently taking it? (ex. 1XDay):  1 x day  Is this a 30 day or 90 day RX:    Preferred Pharm[facility] with phone number[facility]    [facility] [address] [address] [employer] [address] [address]  Phone: [phone] Fax: [phone]  Local or Mail Order:  Local  Ordering Provider:    Best Call Back Number:  [phone] (home) [phone] (work)  Additional Information:

## 2021-03-16 ENCOUNTER — TELEPHONE (OUTPATIENT)
Dept: CARDIOLOGY | Facility: CLINIC | Age: 66
End: 2021-03-16

## 2021-03-23 ENCOUNTER — OFFICE VISIT (OUTPATIENT)
Dept: CARDIOLOGY | Facility: CLINIC | Age: 66
End: 2021-03-23
Payer: MEDICARE

## 2021-03-23 VITALS
OXYGEN SATURATION: 98 % | HEIGHT: 73 IN | DIASTOLIC BLOOD PRESSURE: 84 MMHG | HEART RATE: 68 BPM | BODY MASS INDEX: 33.93 KG/M2 | SYSTOLIC BLOOD PRESSURE: 128 MMHG | WEIGHT: 256 LBS | RESPIRATION RATE: 18 BRPM

## 2021-03-23 DIAGNOSIS — I48.0 PAROXYSMAL ATRIAL FIBRILLATION: ICD-10-CM

## 2021-03-23 DIAGNOSIS — I25.10 CORONARY ARTERY DISEASE INVOLVING NATIVE CORONARY ARTERY OF NATIVE HEART WITHOUT ANGINA PECTORIS: ICD-10-CM

## 2021-03-23 DIAGNOSIS — Z00.00 ROUTINE CHECK-UP: Primary | ICD-10-CM

## 2021-03-23 DIAGNOSIS — R53.83 OTHER FATIGUE: ICD-10-CM

## 2021-03-23 DIAGNOSIS — R94.31 NONSPECIFIC ABNORMAL ELECTROCARDIOGRAM (ECG) (EKG): ICD-10-CM

## 2021-03-23 DIAGNOSIS — I50.32 CHRONIC HEART FAILURE WITH PRESERVED EJECTION FRACTION: ICD-10-CM

## 2021-03-23 DIAGNOSIS — F17.200 CURRENT EVERY DAY SMOKER: ICD-10-CM

## 2021-03-23 DIAGNOSIS — R06.02 SOB (SHORTNESS OF BREATH) ON EXERTION: ICD-10-CM

## 2021-03-23 PROCEDURE — 1159F MED LIST DOCD IN RCRD: CPT | Mod: S$GLB,,, | Performed by: NURSE PRACTITIONER

## 2021-03-23 PROCEDURE — 3008F BODY MASS INDEX DOCD: CPT | Mod: CPTII,S$GLB,, | Performed by: NURSE PRACTITIONER

## 2021-03-23 PROCEDURE — 99214 PR OFFICE/OUTPT VISIT, EST, LEVL IV, 30-39 MIN: ICD-10-PCS | Mod: 25,S$GLB,, | Performed by: NURSE PRACTITIONER

## 2021-03-23 PROCEDURE — 1159F PR MEDICATION LIST DOCUMENTED IN MEDICAL RECORD: ICD-10-PCS | Mod: S$GLB,,, | Performed by: NURSE PRACTITIONER

## 2021-03-23 PROCEDURE — 93000 EKG 12-LEAD: ICD-10-PCS | Mod: S$GLB,,, | Performed by: SPECIALIST

## 2021-03-23 PROCEDURE — 3008F PR BODY MASS INDEX (BMI) DOCUMENTED: ICD-10-PCS | Mod: CPTII,S$GLB,, | Performed by: NURSE PRACTITIONER

## 2021-03-23 PROCEDURE — 99214 OFFICE O/P EST MOD 30 MIN: CPT | Mod: 25,S$GLB,, | Performed by: NURSE PRACTITIONER

## 2021-03-23 PROCEDURE — 3288F PR FALLS RISK ASSESSMENT DOCUMENTED: ICD-10-PCS | Mod: CPTII,S$GLB,, | Performed by: NURSE PRACTITIONER

## 2021-03-23 PROCEDURE — 1101F PT FALLS ASSESS-DOCD LE1/YR: CPT | Mod: CPTII,S$GLB,, | Performed by: NURSE PRACTITIONER

## 2021-03-23 PROCEDURE — 1101F PR PT FALLS ASSESS DOC 0-1 FALLS W/OUT INJ PAST YR: ICD-10-PCS | Mod: CPTII,S$GLB,, | Performed by: NURSE PRACTITIONER

## 2021-03-23 PROCEDURE — 93000 ELECTROCARDIOGRAM COMPLETE: CPT | Mod: S$GLB,,, | Performed by: SPECIALIST

## 2021-03-23 PROCEDURE — 3288F FALL RISK ASSESSMENT DOCD: CPT | Mod: CPTII,S$GLB,, | Performed by: NURSE PRACTITIONER

## 2021-04-06 ENCOUNTER — HOSPITAL ENCOUNTER (OUTPATIENT)
Dept: CARDIOLOGY | Facility: CLINIC | Age: 66
Discharge: HOME | End: 2021-04-06
Attending: NURSE PRACTITIONER
Payer: MEDICARE

## 2021-04-06 ENCOUNTER — HOSPITAL ENCOUNTER (OUTPATIENT)
Dept: RADIOLOGY | Facility: CLINIC | Age: 66
Discharge: HOME | End: 2021-04-06
Attending: NURSE PRACTITIONER
Payer: MEDICARE

## 2021-04-06 VITALS — HEIGHT: 73 IN | WEIGHT: 256 LBS | BODY MASS INDEX: 33.93 KG/M2

## 2021-04-06 DIAGNOSIS — R94.31 NONSPECIFIC ABNORMAL ELECTROCARDIOGRAM (ECG) (EKG): ICD-10-CM

## 2021-04-06 DIAGNOSIS — R06.02 SOB (SHORTNESS OF BREATH) ON EXERTION: ICD-10-CM

## 2021-04-06 DIAGNOSIS — I25.10 CORONARY ARTERY DISEASE INVOLVING NATIVE CORONARY ARTERY OF NATIVE HEART WITHOUT ANGINA PECTORIS: ICD-10-CM

## 2021-04-06 PROCEDURE — A9502 TC99M TETROFOSMIN: HCPCS | Mod: S$GLB,,, | Performed by: INTERNAL MEDICINE

## 2021-04-06 PROCEDURE — 93306 ECHO (CUPID ONLY): ICD-10-PCS | Mod: S$GLB,,, | Performed by: INTERNAL MEDICINE

## 2021-04-06 PROCEDURE — 78452 STRESS TEST WITH MYOCARDIAL PERFUSION (CUPID ONLY): ICD-10-PCS | Mod: S$GLB,,, | Performed by: INTERNAL MEDICINE

## 2021-04-06 PROCEDURE — A9502 STRESS TEST WITH MYOCARDIAL PERFUSION (CUPID ONLY): ICD-10-PCS | Mod: S$GLB,,, | Performed by: INTERNAL MEDICINE

## 2021-04-06 PROCEDURE — 78452 HT MUSCLE IMAGE SPECT MULT: CPT | Mod: S$GLB,,, | Performed by: INTERNAL MEDICINE

## 2021-04-06 PROCEDURE — 93015 STRESS TEST WITH MYOCARDIAL PERFUSION (CUPID ONLY): ICD-10-PCS | Mod: S$GLB,,, | Performed by: INTERNAL MEDICINE

## 2021-04-06 PROCEDURE — 93306 TTE W/DOPPLER COMPLETE: CPT | Mod: S$GLB,,, | Performed by: INTERNAL MEDICINE

## 2021-04-06 PROCEDURE — 93015 CV STRESS TEST SUPVJ I&R: CPT | Mod: S$GLB,,, | Performed by: INTERNAL MEDICINE

## 2021-04-06 RX ORDER — REGADENOSON 0.08 MG/ML
0.4 INJECTION, SOLUTION INTRAVENOUS ONCE
Status: COMPLETED | OUTPATIENT
Start: 2021-04-06 | End: 2021-04-06

## 2021-04-06 RX ADMIN — REGADENOSON 0.4 MG: 0.08 INJECTION, SOLUTION INTRAVENOUS at 08:04

## 2021-04-07 ENCOUNTER — HOSPITAL ENCOUNTER (OUTPATIENT)
Dept: RADIOLOGY | Facility: CLINIC | Age: 66
Discharge: HOME | End: 2021-04-07
Attending: NURSE PRACTITIONER
Payer: MEDICARE

## 2021-04-08 ENCOUNTER — OFFICE VISIT (OUTPATIENT)
Dept: CARDIOLOGY | Facility: CLINIC | Age: 66
End: 2021-04-08
Payer: MEDICARE

## 2021-04-08 VITALS
HEIGHT: 73 IN | HEART RATE: 70 BPM | OXYGEN SATURATION: 98 % | WEIGHT: 256 LBS | DIASTOLIC BLOOD PRESSURE: 72 MMHG | SYSTOLIC BLOOD PRESSURE: 138 MMHG | RESPIRATION RATE: 16 BRPM | BODY MASS INDEX: 33.93 KG/M2

## 2021-04-08 DIAGNOSIS — F17.200 CURRENT EVERY DAY SMOKER: ICD-10-CM

## 2021-04-08 DIAGNOSIS — E66.01 MORBID OBESITY: ICD-10-CM

## 2021-04-08 DIAGNOSIS — R94.31 NONSPECIFIC ABNORMAL ELECTROCARDIOGRAM (ECG) (EKG): ICD-10-CM

## 2021-04-08 DIAGNOSIS — R53.83 OTHER FATIGUE: Primary | ICD-10-CM

## 2021-04-08 DIAGNOSIS — I50.32 CHRONIC HEART FAILURE WITH PRESERVED EJECTION FRACTION: ICD-10-CM

## 2021-04-08 DIAGNOSIS — I48.0 PAROXYSMAL A-FIB: ICD-10-CM

## 2021-04-08 LAB
AORTIC ROOT ANNULUS: 3.9 CM
AORTIC VALVE CUSP SEPERATION: 2.3 CM
AV INDEX (PROSTH): 0.82
AV MEAN GRADIENT: 2 MMHG
AV PEAK GRADIENT: 5 MMHG
AV VALVE AREA: 4.04 CM2
AV VELOCITY RATIO: 0.73
BSA FOR ECHO PROCEDURE: 2.45 M2
CV ECHO LV RWT: 0.44 CM
CV PHARM DOSE: 0.4 MG
CV STRESS BASE HR: 77 BPM
DIASTOLIC BLOOD PRESSURE: 90 MMHG
DOP CALC AO PEAK VEL: 1.13 M/S
DOP CALC AO VTI: 24.3 CM
DOP CALC LVOT AREA: 4.9 CM2
DOP CALC LVOT DIAMETER: 2.5 CM
DOP CALC LVOT PEAK VEL: 0.82 M/S
DOP CALC LVOT STROKE VOLUME: 98.13 CM3
DOP CALCLVOT PEAK VEL VTI: 20 CM
E WAVE DECELERATION TIME: 253 MS
E/A RATIO: 0.53
E/E' RATIO: 8.33 M/S
ECHO LV POSTERIOR WALL: 1.15 CM (ref 0.6–1.1)
EJECTION FRACTION- HIGH: 65 %
EJECTION FRACTION: 60 %
END DIASTOLIC INDEX-HIGH: 158 ML/M2
END DIASTOLIC INDEX-LOW: 94 ML/M2
END SYSTOLIC INDEX-HIGH: 71 ML/M2
END SYSTOLIC INDEX-LOW: 33 ML/M2
FRACTIONAL SHORTENING: 35 % (ref 28–44)
INTERVENTRICULAR SEPTUM: 1.24 CM (ref 0.6–1.1)
IVRT: 90 MS
LA MAJOR: 4.1 CM
LEFT ATRIUM SIZE: 4.3 CM
LEFT INTERNAL DIMENSION IN SYSTOLE: 3.36 CM (ref 2.1–4)
LEFT VENTRICLE MASS INDEX: 103 G/M2
LEFT VENTRICULAR INTERNAL DIMENSION IN DIASTOLE: 5.17 CM (ref 3.5–6)
LEFT VENTRICULAR MASS: 245.12 G
LV LATERAL E/E' RATIO: 7.14 M/S
LV SEPTAL E/E' RATIO: 10 M/S
MV PEAK A VEL: 0.94 M/S
MV PEAK E VEL: 0.5 M/S
NUC STRESS DIASTOLIC VOLUME INDEX: 88
NUC STRESS EJECTION FRACTION: 44 %
NUC STRESS SYSTOLIC VOLUME INDEX: 49
OHS CV CPX 1 MINUTE RECOVERY HEART RATE: 90 BPM
OHS CV CPX 85 PERCENT MAX PREDICTED HEART RATE MALE: 131
OHS CV CPX MAX PREDICTED HEART RATE: 154
OHS CV CPX PATIENT IS FEMALE: 0
OHS CV CPX PATIENT IS MALE: 1
OHS CV CPX PEAK DIASTOLIC BLOOD PRESSURE: 90 MMHG
OHS CV CPX PEAK HEAR RATE: 77 BPM
OHS CV CPX PEAK RATE PRESSURE PRODUCT: 9240
OHS CV CPX PEAK SYSTOLIC BLOOD PRESSURE: 120 MMHG
OHS CV CPX PERCENT MAX PREDICTED HEART RATE ACHIEVED: 50
OHS CV CPX RATE PRESSURE PRODUCT PRESENTING: 9240
PISA TR MAX VEL: 2.01 M/S
RETIRED EF AND QEF - SEE NOTES: 53 %
RIGHT VENTRICULAR END-DIASTOLIC DIMENSION: 2.9 CM
SYSTOLIC BLOOD PRESSURE: 120 MMHG
TDI LATERAL: 0.07 M/S
TDI SEPTAL: 0.05 M/S
TDI: 0.06 M/S
TR MAX PG: 16 MMHG

## 2021-04-08 PROCEDURE — 3288F PR FALLS RISK ASSESSMENT DOCUMENTED: ICD-10-PCS | Mod: CPTII,S$GLB,, | Performed by: NURSE PRACTITIONER

## 2021-04-08 PROCEDURE — 3008F PR BODY MASS INDEX (BMI) DOCUMENTED: ICD-10-PCS | Mod: CPTII,S$GLB,, | Performed by: NURSE PRACTITIONER

## 2021-04-08 PROCEDURE — 3288F FALL RISK ASSESSMENT DOCD: CPT | Mod: CPTII,S$GLB,, | Performed by: NURSE PRACTITIONER

## 2021-04-08 PROCEDURE — 99214 PR OFFICE/OUTPT VISIT, EST, LEVL IV, 30-39 MIN: ICD-10-PCS | Mod: S$GLB,,, | Performed by: NURSE PRACTITIONER

## 2021-04-08 PROCEDURE — 1125F PR PAIN SEVERITY QUANTIFIED, PAIN PRESENT: ICD-10-PCS | Mod: S$GLB,,, | Performed by: NURSE PRACTITIONER

## 2021-04-08 PROCEDURE — 1101F PR PT FALLS ASSESS DOC 0-1 FALLS W/OUT INJ PAST YR: ICD-10-PCS | Mod: CPTII,S$GLB,, | Performed by: NURSE PRACTITIONER

## 2021-04-08 PROCEDURE — 1101F PT FALLS ASSESS-DOCD LE1/YR: CPT | Mod: CPTII,S$GLB,, | Performed by: NURSE PRACTITIONER

## 2021-04-08 PROCEDURE — 1159F PR MEDICATION LIST DOCUMENTED IN MEDICAL RECORD: ICD-10-PCS | Mod: S$GLB,,, | Performed by: NURSE PRACTITIONER

## 2021-04-08 PROCEDURE — 3008F BODY MASS INDEX DOCD: CPT | Mod: CPTII,S$GLB,, | Performed by: NURSE PRACTITIONER

## 2021-04-08 PROCEDURE — 99214 OFFICE O/P EST MOD 30 MIN: CPT | Mod: S$GLB,,, | Performed by: NURSE PRACTITIONER

## 2021-04-08 PROCEDURE — 1159F MED LIST DOCD IN RCRD: CPT | Mod: S$GLB,,, | Performed by: NURSE PRACTITIONER

## 2021-04-08 PROCEDURE — 1125F AMNT PAIN NOTED PAIN PRSNT: CPT | Mod: S$GLB,,, | Performed by: NURSE PRACTITIONER

## 2021-04-29 ENCOUNTER — PATIENT MESSAGE (OUTPATIENT)
Dept: RESEARCH | Facility: HOSPITAL | Age: 66
End: 2021-04-29

## 2021-06-14 ENCOUNTER — HOSPITAL ENCOUNTER (INPATIENT)
Facility: HOSPITAL | Age: 66
LOS: 2 days | Discharge: HOME | DRG: 507 | End: 2021-06-16
Attending: EMERGENCY MEDICINE | Admitting: INTERNAL MEDICINE
Payer: MEDICARE

## 2021-06-14 ENCOUNTER — OFFICE VISIT (OUTPATIENT)
Dept: URGENT CARE | Facility: CLINIC | Age: 66
End: 2021-06-14
Payer: MEDICARE

## 2021-06-14 VITALS
SYSTOLIC BLOOD PRESSURE: 151 MMHG | BODY MASS INDEX: 33.8 KG/M2 | TEMPERATURE: 98 F | HEIGHT: 73 IN | HEART RATE: 92 BPM | WEIGHT: 255 LBS | OXYGEN SATURATION: 99 % | DIASTOLIC BLOOD PRESSURE: 89 MMHG

## 2021-06-14 DIAGNOSIS — L03.114 CELLULITIS OF LEFT UPPER EXTREMITY: ICD-10-CM

## 2021-06-14 DIAGNOSIS — M25.522 LEFT ELBOW PAIN: Primary | ICD-10-CM

## 2021-06-14 DIAGNOSIS — M00.9: ICD-10-CM

## 2021-06-14 DIAGNOSIS — M10.9 GOUTY BURSITIS OF LEFT OLECRANON: ICD-10-CM

## 2021-06-14 DIAGNOSIS — M79.89 ARM SWELLING: Primary | ICD-10-CM

## 2021-06-14 DIAGNOSIS — M71.122 SEPTIC OLECRANON BURSITIS, LEFT: ICD-10-CM

## 2021-06-14 LAB
ALBUMIN SERPL BCP-MCNC: 4 G/DL (ref 3.5–5.2)
ALP SERPL-CCNC: 85 U/L (ref 55–135)
ALT SERPL W/O P-5'-P-CCNC: 45 U/L (ref 10–44)
ANION GAP SERPL CALC-SCNC: 11 MMOL/L (ref 8–16)
AST SERPL-CCNC: 32 U/L (ref 10–40)
BASOPHILS # BLD AUTO: 0.06 K/UL (ref 0–0.2)
BASOPHILS NFR BLD: 0.6 % (ref 0–1.9)
BILIRUB SERPL-MCNC: 2 MG/DL (ref 0.1–1)
BUN SERPL-MCNC: 15 MG/DL (ref 8–23)
CALCIUM SERPL-MCNC: 9.1 MG/DL (ref 8.7–10.5)
CHLORIDE SERPL-SCNC: 95 MMOL/L (ref 95–110)
CO2 SERPL-SCNC: 27 MMOL/L (ref 23–29)
CREAT SERPL-MCNC: 1 MG/DL (ref 0.5–1.4)
DIFFERENTIAL METHOD: ABNORMAL
EOSINOPHIL # BLD AUTO: 0.1 K/UL (ref 0–0.5)
EOSINOPHIL NFR BLD: 0.6 % (ref 0–8)
ERYTHROCYTE [DISTWIDTH] IN BLOOD BY AUTOMATED COUNT: 11.9 % (ref 11.5–14.5)
EST. GFR  (AFRICAN AMERICAN): >60 ML/MIN/1.73 M^2
EST. GFR  (NON AFRICAN AMERICAN): >60 ML/MIN/1.73 M^2
GLUCOSE SERPL-MCNC: 120 MG/DL (ref 70–110)
HCT VFR BLD AUTO: 44.3 % (ref 40–54)
HGB BLD-MCNC: 15.7 G/DL (ref 14–18)
IMM GRANULOCYTES # BLD AUTO: 0.06 K/UL (ref 0–0.04)
IMM GRANULOCYTES NFR BLD AUTO: 0.6 % (ref 0–0.5)
INR PPP: 1.4
LACTATE SERPL-SCNC: 1.1 MMOL/L (ref 0.5–1.9)
LYMPHOCYTES # BLD AUTO: 1.6 K/UL (ref 1–4.8)
LYMPHOCYTES NFR BLD: 17.5 % (ref 18–48)
MCH RBC QN AUTO: 34.8 PG (ref 27–31)
MCHC RBC AUTO-ENTMCNC: 35.4 G/DL (ref 32–36)
MCV RBC AUTO: 98 FL (ref 82–98)
MONOCYTES # BLD AUTO: 1.2 K/UL (ref 0.3–1)
MONOCYTES NFR BLD: 13.2 % (ref 4–15)
NEUTROPHILS # BLD AUTO: 6.3 K/UL (ref 1.8–7.7)
NEUTROPHILS NFR BLD: 67.5 % (ref 38–73)
NRBC BLD-RTO: 0 /100 WBC
PLATELET # BLD AUTO: 133 K/UL (ref 150–450)
PMV BLD AUTO: 12 FL (ref 9.2–12.9)
POTASSIUM SERPL-SCNC: 4.7 MMOL/L (ref 3.5–5.1)
PROT SERPL-MCNC: 7.6 G/DL (ref 6–8.4)
PROTHROMBIN TIME: 16.3 SEC (ref 11.8–14.3)
RBC # BLD AUTO: 4.51 M/UL (ref 4.6–6.2)
SARS-COV-2 RDRP RESP QL NAA+PROBE: NEGATIVE
SODIUM SERPL-SCNC: 133 MMOL/L (ref 136–145)
WBC # BLD AUTO: 9.33 K/UL (ref 3.9–12.7)

## 2021-06-14 PROCEDURE — 87040 BLOOD CULTURE FOR BACTERIA: CPT | Mod: 59 | Performed by: EMERGENCY MEDICINE

## 2021-06-14 PROCEDURE — 80053 COMPREHEN METABOLIC PANEL: CPT | Performed by: NURSE PRACTITIONER

## 2021-06-14 PROCEDURE — 3008F BODY MASS INDEX DOCD: CPT | Mod: CPTII,S$GLB,, | Performed by: NURSE PRACTITIONER

## 2021-06-14 PROCEDURE — 63600175 PHARM REV CODE 636 W HCPCS: Performed by: INTERNAL MEDICINE

## 2021-06-14 PROCEDURE — 12000002 HC ACUTE/MED SURGE SEMI-PRIVATE ROOM

## 2021-06-14 PROCEDURE — 36415 COLL VENOUS BLD VENIPUNCTURE: CPT | Performed by: NURSE PRACTITIONER

## 2021-06-14 PROCEDURE — 85025 COMPLETE CBC W/AUTO DIFF WBC: CPT | Performed by: NURSE PRACTITIONER

## 2021-06-14 PROCEDURE — 25000003 PHARM REV CODE 250: Performed by: INTERNAL MEDICINE

## 2021-06-14 PROCEDURE — 99285 EMERGENCY DEPT VISIT HI MDM: CPT | Mod: 25

## 2021-06-14 PROCEDURE — 10060 I&D ABSCESS SIMPLE/SINGLE: CPT

## 2021-06-14 PROCEDURE — 25000003 PHARM REV CODE 250

## 2021-06-14 PROCEDURE — 73080 X-RAY EXAM OF ELBOW: CPT | Mod: LT,S$GLB,, | Performed by: RADIOLOGY

## 2021-06-14 PROCEDURE — 36415 COLL VENOUS BLD VENIPUNCTURE: CPT | Performed by: EMERGENCY MEDICINE

## 2021-06-14 PROCEDURE — 25000003 PHARM REV CODE 250: Performed by: EMERGENCY MEDICINE

## 2021-06-14 PROCEDURE — 3008F PR BODY MASS INDEX (BMI) DOCUMENTED: ICD-10-PCS | Mod: CPTII,S$GLB,, | Performed by: NURSE PRACTITIONER

## 2021-06-14 PROCEDURE — 63600175 PHARM REV CODE 636 W HCPCS

## 2021-06-14 PROCEDURE — 99214 OFFICE O/P EST MOD 30 MIN: CPT | Mod: S$GLB,,, | Performed by: NURSE PRACTITIONER

## 2021-06-14 PROCEDURE — 73080 XR ELBOW COMPLETE 3 VIEW LEFT: ICD-10-PCS | Mod: LT,S$GLB,, | Performed by: RADIOLOGY

## 2021-06-14 PROCEDURE — 83605 ASSAY OF LACTIC ACID: CPT | Performed by: EMERGENCY MEDICINE

## 2021-06-14 PROCEDURE — U0002 COVID-19 LAB TEST NON-CDC: HCPCS | Performed by: INTERNAL MEDICINE

## 2021-06-14 PROCEDURE — 99214 PR OFFICE/OUTPT VISIT, EST, LEVL IV, 30-39 MIN: ICD-10-PCS | Mod: S$GLB,,, | Performed by: NURSE PRACTITIONER

## 2021-06-14 PROCEDURE — 85610 PROTHROMBIN TIME: CPT | Performed by: NURSE PRACTITIONER

## 2021-06-14 RX ORDER — POTASSIUM CHLORIDE 7.45 MG/ML
40 INJECTION INTRAVENOUS
Status: DISCONTINUED | OUTPATIENT
Start: 2021-06-14 | End: 2021-06-16 | Stop reason: HOSPADM

## 2021-06-14 RX ORDER — ONDANSETRON 2 MG/ML
4 INJECTION INTRAMUSCULAR; INTRAVENOUS EVERY 6 HOURS PRN
Status: DISCONTINUED | OUTPATIENT
Start: 2021-06-14 | End: 2021-06-16 | Stop reason: HOSPADM

## 2021-06-14 RX ORDER — VANCOMYCIN HCL IN 5 % DEXTROSE 1G/250ML
1000 PLASTIC BAG, INJECTION (ML) INTRAVENOUS
Status: DISCONTINUED | OUTPATIENT
Start: 2021-06-14 | End: 2021-06-14

## 2021-06-14 RX ORDER — OXYCODONE AND ACETAMINOPHEN 5; 325 MG/1; MG/1
1 TABLET ORAL EVERY 6 HOURS PRN
Status: DISCONTINUED | OUTPATIENT
Start: 2021-06-14 | End: 2021-06-16 | Stop reason: HOSPADM

## 2021-06-14 RX ORDER — LANOLIN ALCOHOL/MO/W.PET/CERES
800 CREAM (GRAM) TOPICAL
Status: DISCONTINUED | OUTPATIENT
Start: 2021-06-14 | End: 2021-06-16 | Stop reason: HOSPADM

## 2021-06-14 RX ORDER — ACETAMINOPHEN 325 MG/1
650 TABLET ORAL EVERY 4 HOURS PRN
Status: DISCONTINUED | OUTPATIENT
Start: 2021-06-14 | End: 2021-06-16 | Stop reason: HOSPADM

## 2021-06-14 RX ORDER — POTASSIUM CHLORIDE 7.45 MG/ML
20 INJECTION INTRAVENOUS
Status: DISCONTINUED | OUTPATIENT
Start: 2021-06-14 | End: 2021-06-16 | Stop reason: HOSPADM

## 2021-06-14 RX ORDER — HYDROMORPHONE HYDROCHLORIDE 1 MG/ML
0.5 INJECTION, SOLUTION INTRAMUSCULAR; INTRAVENOUS; SUBCUTANEOUS EVERY 6 HOURS PRN
Status: DISCONTINUED | OUTPATIENT
Start: 2021-06-14 | End: 2021-06-16 | Stop reason: HOSPADM

## 2021-06-14 RX ORDER — POTASSIUM CHLORIDE 20 MEQ/1
40 TABLET, EXTENDED RELEASE ORAL
Status: DISCONTINUED | OUTPATIENT
Start: 2021-06-14 | End: 2021-06-16 | Stop reason: HOSPADM

## 2021-06-14 RX ORDER — CLINDAMYCIN PHOSPHATE 900 MG/50ML
900 INJECTION, SOLUTION INTRAVENOUS
Status: COMPLETED | OUTPATIENT
Start: 2021-06-14 | End: 2021-06-14

## 2021-06-14 RX ORDER — FAMOTIDINE 20 MG/1
20 TABLET, FILM COATED ORAL 2 TIMES DAILY
Status: DISCONTINUED | OUTPATIENT
Start: 2021-06-14 | End: 2021-06-16 | Stop reason: HOSPADM

## 2021-06-14 RX ORDER — SODIUM CHLORIDE 9 MG/ML
INJECTION, SOLUTION INTRAVENOUS CONTINUOUS
Status: DISCONTINUED | OUTPATIENT
Start: 2021-06-14 | End: 2021-06-15

## 2021-06-14 RX ORDER — POTASSIUM CHLORIDE 20 MEQ/1
20 TABLET, EXTENDED RELEASE ORAL
Status: DISCONTINUED | OUTPATIENT
Start: 2021-06-14 | End: 2021-06-16 | Stop reason: HOSPADM

## 2021-06-14 RX ORDER — MAGNESIUM SULFATE HEPTAHYDRATE 40 MG/ML
4 INJECTION, SOLUTION INTRAVENOUS
Status: DISCONTINUED | OUTPATIENT
Start: 2021-06-14 | End: 2021-06-16 | Stop reason: HOSPADM

## 2021-06-14 RX ORDER — MAGNESIUM SULFATE HEPTAHYDRATE 40 MG/ML
2 INJECTION, SOLUTION INTRAVENOUS
Status: DISCONTINUED | OUTPATIENT
Start: 2021-06-14 | End: 2021-06-16 | Stop reason: HOSPADM

## 2021-06-14 RX ORDER — CEFEPIME HYDROCHLORIDE 1 G/50ML
2 INJECTION, SOLUTION INTRAVENOUS
Status: DISCONTINUED | OUTPATIENT
Start: 2021-06-14 | End: 2021-06-15

## 2021-06-14 RX ORDER — MAGNESIUM SULFATE 1 G/100ML
1 INJECTION INTRAVENOUS
Status: DISCONTINUED | OUTPATIENT
Start: 2021-06-14 | End: 2021-06-16 | Stop reason: HOSPADM

## 2021-06-14 RX ORDER — LIDOCAINE HYDROCHLORIDE 10 MG/ML
10 INJECTION, SOLUTION EPIDURAL; INFILTRATION; INTRACAUDAL; PERINEURAL
Status: COMPLETED | OUTPATIENT
Start: 2021-06-14 | End: 2021-06-14

## 2021-06-14 RX ORDER — METOPROLOL SUCCINATE 50 MG/1
50 TABLET, EXTENDED RELEASE ORAL DAILY
Status: DISCONTINUED | OUTPATIENT
Start: 2021-06-14 | End: 2021-06-16 | Stop reason: HOSPADM

## 2021-06-14 RX ORDER — LOSARTAN POTASSIUM 25 MG/1
25 TABLET ORAL EVERY EVENING
Status: DISCONTINUED | OUTPATIENT
Start: 2021-06-14 | End: 2021-06-16 | Stop reason: HOSPADM

## 2021-06-14 RX ADMIN — FAMOTIDINE 20 MG: 20 TABLET ORAL at 09:06

## 2021-06-14 RX ADMIN — HYDROMORPHONE HYDROCHLORIDE 0.5 MG: 1 INJECTION, SOLUTION INTRAMUSCULAR; INTRAVENOUS; SUBCUTANEOUS at 07:06

## 2021-06-14 RX ADMIN — CEFEPIME HYDROCHLORIDE 2 G: 2 INJECTION, SOLUTION INTRAVENOUS at 06:06

## 2021-06-14 RX ADMIN — CLINDAMYCIN IN 5 PERCENT DEXTROSE 900 MG: 18 INJECTION, SOLUTION INTRAVENOUS at 03:06

## 2021-06-14 RX ADMIN — METOPROLOL SUCCINATE 50 MG: 50 TABLET, FILM COATED, EXTENDED RELEASE ORAL at 04:06

## 2021-06-14 RX ADMIN — OXYCODONE HYDROCHLORIDE AND ACETAMINOPHEN 1 TABLET: 5; 325 TABLET ORAL at 09:06

## 2021-06-14 RX ADMIN — VANCOMYCIN HYDROCHLORIDE 2000 MG: 500 INJECTION, POWDER, LYOPHILIZED, FOR SOLUTION INTRAVENOUS at 09:06

## 2021-06-14 RX ADMIN — SODIUM CHLORIDE: 0.9 INJECTION, SOLUTION INTRAVENOUS at 06:06

## 2021-06-14 RX ADMIN — LIDOCAINE HYDROCHLORIDE 100 MG: 10 INJECTION, SOLUTION EPIDURAL; INFILTRATION; INTRACAUDAL; PERINEURAL at 03:06

## 2021-06-14 RX ADMIN — LOSARTAN POTASSIUM 25 MG: 25 TABLET, FILM COATED ORAL at 09:06

## 2021-06-15 PROBLEM — M10.9 GOUTY BURSITIS OF LEFT OLECRANON: Status: ACTIVE | Noted: 2021-06-15

## 2021-06-15 PROBLEM — M71.122 SEPTIC OLECRANON BURSITIS, LEFT: Status: ACTIVE | Noted: 2021-06-14

## 2021-06-15 LAB
ALBUMIN SERPL BCP-MCNC: 3.3 G/DL (ref 3.5–5.2)
ALP SERPL-CCNC: 69 U/L (ref 55–135)
ALT SERPL W/O P-5'-P-CCNC: 33 U/L (ref 10–44)
ANION GAP SERPL CALC-SCNC: 9 MMOL/L (ref 8–16)
AST SERPL-CCNC: 23 U/L (ref 10–40)
BASOPHILS # BLD AUTO: 0.04 K/UL (ref 0–0.2)
BASOPHILS NFR BLD: 0.6 % (ref 0–1.9)
BILIRUB SERPL-MCNC: 1.6 MG/DL (ref 0.1–1)
BUN SERPL-MCNC: 14 MG/DL (ref 8–23)
CALCIUM SERPL-MCNC: 8.3 MG/DL (ref 8.7–10.5)
CHLORIDE SERPL-SCNC: 104 MMOL/L (ref 95–110)
CO2 SERPL-SCNC: 23 MMOL/L (ref 23–29)
CREAT SERPL-MCNC: 0.9 MG/DL (ref 0.5–1.4)
DIFFERENTIAL METHOD: ABNORMAL
EOSINOPHIL # BLD AUTO: 0.1 K/UL (ref 0–0.5)
EOSINOPHIL NFR BLD: 1.6 % (ref 0–8)
ERYTHROCYTE [DISTWIDTH] IN BLOOD BY AUTOMATED COUNT: 12.1 % (ref 11.5–14.5)
EST. GFR  (AFRICAN AMERICAN): >60 ML/MIN/1.73 M^2
EST. GFR  (NON AFRICAN AMERICAN): >60 ML/MIN/1.73 M^2
GLUCOSE SERPL-MCNC: 119 MG/DL (ref 70–110)
HCT VFR BLD AUTO: 39.7 % (ref 40–54)
HGB BLD-MCNC: 14.1 G/DL (ref 14–18)
IMM GRANULOCYTES # BLD AUTO: 0.03 K/UL (ref 0–0.04)
IMM GRANULOCYTES NFR BLD AUTO: 0.4 % (ref 0–0.5)
LYMPHOCYTES # BLD AUTO: 1.5 K/UL (ref 1–4.8)
LYMPHOCYTES NFR BLD: 22.2 % (ref 18–48)
MAGNESIUM SERPL-MCNC: 2 MG/DL (ref 1.6–2.6)
MCH RBC QN AUTO: 35.3 PG (ref 27–31)
MCHC RBC AUTO-ENTMCNC: 35.5 G/DL (ref 32–36)
MCV RBC AUTO: 100 FL (ref 82–98)
MONOCYTES # BLD AUTO: 1 K/UL (ref 0.3–1)
MONOCYTES NFR BLD: 14 % (ref 4–15)
NEUTROPHILS # BLD AUTO: 4.3 K/UL (ref 1.8–7.7)
NEUTROPHILS NFR BLD: 61.2 % (ref 38–73)
NRBC BLD-RTO: 0 /100 WBC
PLATELET # BLD AUTO: 111 K/UL (ref 150–450)
PMV BLD AUTO: 11.9 FL (ref 9.2–12.9)
POTASSIUM SERPL-SCNC: 3.8 MMOL/L (ref 3.5–5.1)
PROT SERPL-MCNC: 6.4 G/DL (ref 6–8.4)
RBC # BLD AUTO: 3.99 M/UL (ref 4.6–6.2)
SODIUM SERPL-SCNC: 136 MMOL/L (ref 136–145)
URATE SERPL-MCNC: 7.4 MG/DL (ref 3.4–7)
WBC # BLD AUTO: 6.95 K/UL (ref 3.9–12.7)

## 2021-06-15 PROCEDURE — 12000002 HC ACUTE/MED SURGE SEMI-PRIVATE ROOM

## 2021-06-15 PROCEDURE — 36415 COLL VENOUS BLD VENIPUNCTURE: CPT | Performed by: INTERNAL MEDICINE

## 2021-06-15 PROCEDURE — 80053 COMPREHEN METABOLIC PANEL: CPT | Performed by: INTERNAL MEDICINE

## 2021-06-15 PROCEDURE — 25000003 PHARM REV CODE 250: Performed by: PHYSICIAN ASSISTANT

## 2021-06-15 PROCEDURE — 99223 PR INITIAL HOSPITAL CARE,LEVL III: ICD-10-PCS | Mod: ,,, | Performed by: INTERNAL MEDICINE

## 2021-06-15 PROCEDURE — 83735 ASSAY OF MAGNESIUM: CPT | Performed by: INTERNAL MEDICINE

## 2021-06-15 PROCEDURE — 63600175 PHARM REV CODE 636 W HCPCS

## 2021-06-15 PROCEDURE — 99223 1ST HOSP IP/OBS HIGH 75: CPT | Mod: ,,, | Performed by: INTERNAL MEDICINE

## 2021-06-15 PROCEDURE — 25000003 PHARM REV CODE 250

## 2021-06-15 PROCEDURE — 63600175 PHARM REV CODE 636 W HCPCS: Performed by: INTERNAL MEDICINE

## 2021-06-15 PROCEDURE — 85025 COMPLETE CBC W/AUTO DIFF WBC: CPT | Performed by: INTERNAL MEDICINE

## 2021-06-15 PROCEDURE — 25000003 PHARM REV CODE 250: Performed by: INTERNAL MEDICINE

## 2021-06-15 PROCEDURE — 84550 ASSAY OF BLOOD/URIC ACID: CPT | Performed by: INTERNAL MEDICINE

## 2021-06-15 RX ORDER — COLCHICINE 0.6 MG/1
0.6 TABLET, FILM COATED ORAL DAILY
Status: DISCONTINUED | OUTPATIENT
Start: 2021-06-15 | End: 2021-06-16 | Stop reason: HOSPADM

## 2021-06-15 RX ADMIN — OXYCODONE HYDROCHLORIDE AND ACETAMINOPHEN 1 TABLET: 5; 325 TABLET ORAL at 05:06

## 2021-06-15 RX ADMIN — FAMOTIDINE 20 MG: 20 TABLET ORAL at 08:06

## 2021-06-15 RX ADMIN — LOSARTAN POTASSIUM 25 MG: 25 TABLET, FILM COATED ORAL at 08:06

## 2021-06-15 RX ADMIN — HYDROMORPHONE HYDROCHLORIDE 0.5 MG: 1 INJECTION, SOLUTION INTRAMUSCULAR; INTRAVENOUS; SUBCUTANEOUS at 08:06

## 2021-06-15 RX ADMIN — OXYCODONE HYDROCHLORIDE AND ACETAMINOPHEN 1 TABLET: 5; 325 TABLET ORAL at 10:06

## 2021-06-15 RX ADMIN — OXYCODONE HYDROCHLORIDE AND ACETAMINOPHEN 1 TABLET: 5; 325 TABLET ORAL at 04:06

## 2021-06-15 RX ADMIN — COLCHICINE 0.6 MG: 0.6 TABLET, FILM COATED ORAL at 09:06

## 2021-06-15 RX ADMIN — VANCOMYCIN HYDROCHLORIDE 2000 MG: 500 INJECTION, POWDER, LYOPHILIZED, FOR SOLUTION INTRAVENOUS at 10:06

## 2021-06-15 RX ADMIN — HYDROMORPHONE HYDROCHLORIDE 0.5 MG: 1 INJECTION, SOLUTION INTRAMUSCULAR; INTRAVENOUS; SUBCUTANEOUS at 01:06

## 2021-06-15 RX ADMIN — ACETAMINOPHEN 650 MG: 325 TABLET, FILM COATED ORAL at 04:06

## 2021-06-15 RX ADMIN — ACETAMINOPHEN 650 MG: 325 TABLET, FILM COATED ORAL at 11:06

## 2021-06-15 RX ADMIN — CEFEPIME HYDROCHLORIDE 2 G: 2 INJECTION, SOLUTION INTRAVENOUS at 09:06

## 2021-06-15 RX ADMIN — HYDROMORPHONE HYDROCHLORIDE 0.5 MG: 1 INJECTION, SOLUTION INTRAMUSCULAR; INTRAVENOUS; SUBCUTANEOUS at 06:06

## 2021-06-15 RX ADMIN — CEFEPIME HYDROCHLORIDE 2 G: 2 INJECTION, SOLUTION INTRAVENOUS at 02:06

## 2021-06-15 RX ADMIN — METOPROLOL SUCCINATE 50 MG: 50 TABLET, FILM COATED, EXTENDED RELEASE ORAL at 08:06

## 2021-06-15 RX ADMIN — VANCOMYCIN HYDROCHLORIDE 2000 MG: 500 INJECTION, POWDER, LYOPHILIZED, FOR SOLUTION INTRAVENOUS at 08:06

## 2021-06-16 VITALS
OXYGEN SATURATION: 97 % | SYSTOLIC BLOOD PRESSURE: 150 MMHG | TEMPERATURE: 98 F | HEIGHT: 73 IN | DIASTOLIC BLOOD PRESSURE: 80 MMHG | BODY MASS INDEX: 33.98 KG/M2 | HEART RATE: 72 BPM | WEIGHT: 256.38 LBS | RESPIRATION RATE: 18 BRPM

## 2021-06-16 PROBLEM — M10.9 GOUTY BURSITIS OF LEFT OLECRANON: Status: RESOLVED | Noted: 2021-06-15 | Resolved: 2021-06-16

## 2021-06-16 PROBLEM — L03.114 CELLULITIS OF LEFT UPPER EXTREMITY: Status: RESOLVED | Noted: 2021-06-14 | Resolved: 2021-06-16

## 2021-06-16 PROBLEM — M71.122 SEPTIC OLECRANON BURSITIS, LEFT: Status: RESOLVED | Noted: 2021-06-14 | Resolved: 2021-06-16

## 2021-06-16 LAB
ALBUMIN SERPL BCP-MCNC: 3.5 G/DL (ref 3.5–5.2)
ALP SERPL-CCNC: 70 U/L (ref 55–135)
ALT SERPL W/O P-5'-P-CCNC: 32 U/L (ref 10–44)
ANION GAP SERPL CALC-SCNC: 9 MMOL/L (ref 8–16)
AST SERPL-CCNC: 22 U/L (ref 10–40)
BASOPHILS # BLD AUTO: 0.05 K/UL (ref 0–0.2)
BASOPHILS NFR BLD: 0.7 % (ref 0–1.9)
BILIRUB SERPL-MCNC: 1.1 MG/DL (ref 0.1–1)
BUN SERPL-MCNC: 11 MG/DL (ref 8–23)
CALCIUM SERPL-MCNC: 8.5 MG/DL (ref 8.7–10.5)
CHLORIDE SERPL-SCNC: 103 MMOL/L (ref 95–110)
CO2 SERPL-SCNC: 25 MMOL/L (ref 23–29)
CREAT SERPL-MCNC: 0.9 MG/DL (ref 0.5–1.4)
DIFFERENTIAL METHOD: ABNORMAL
EOSINOPHIL # BLD AUTO: 0.2 K/UL (ref 0–0.5)
EOSINOPHIL NFR BLD: 3 % (ref 0–8)
ERYTHROCYTE [DISTWIDTH] IN BLOOD BY AUTOMATED COUNT: 12.1 % (ref 11.5–14.5)
EST. GFR  (AFRICAN AMERICAN): >60 ML/MIN/1.73 M^2
EST. GFR  (NON AFRICAN AMERICAN): >60 ML/MIN/1.73 M^2
GLUCOSE SERPL-MCNC: 108 MG/DL (ref 70–110)
HCT VFR BLD AUTO: 42.5 % (ref 40–54)
HGB BLD-MCNC: 14.7 G/DL (ref 14–18)
IMM GRANULOCYTES # BLD AUTO: 0.02 K/UL (ref 0–0.04)
IMM GRANULOCYTES NFR BLD AUTO: 0.3 % (ref 0–0.5)
LYMPHOCYTES # BLD AUTO: 1.6 K/UL (ref 1–4.8)
LYMPHOCYTES NFR BLD: 22.9 % (ref 18–48)
MAGNESIUM SERPL-MCNC: 2.2 MG/DL (ref 1.6–2.6)
MCH RBC QN AUTO: 34.7 PG (ref 27–31)
MCHC RBC AUTO-ENTMCNC: 34.6 G/DL (ref 32–36)
MCV RBC AUTO: 100 FL (ref 82–98)
MONOCYTES # BLD AUTO: 0.8 K/UL (ref 0.3–1)
MONOCYTES NFR BLD: 11.8 % (ref 4–15)
NEUTROPHILS # BLD AUTO: 4.2 K/UL (ref 1.8–7.7)
NEUTROPHILS NFR BLD: 61.3 % (ref 38–73)
NRBC BLD-RTO: 0 /100 WBC
PLATELET # BLD AUTO: 121 K/UL (ref 150–450)
PMV BLD AUTO: 12.1 FL (ref 9.2–12.9)
POTASSIUM SERPL-SCNC: 3.9 MMOL/L (ref 3.5–5.1)
PROT SERPL-MCNC: 6.6 G/DL (ref 6–8.4)
RBC # BLD AUTO: 4.24 M/UL (ref 4.6–6.2)
SODIUM SERPL-SCNC: 137 MMOL/L (ref 136–145)
VANCOMYCIN TROUGH SERPL-MCNC: 20.2 UG/ML (ref 10–22)
WBC # BLD AUTO: 6.93 K/UL (ref 3.9–12.7)

## 2021-06-16 PROCEDURE — 85025 COMPLETE CBC W/AUTO DIFF WBC: CPT | Performed by: INTERNAL MEDICINE

## 2021-06-16 PROCEDURE — 80202 ASSAY OF VANCOMYCIN: CPT | Performed by: INTERNAL MEDICINE

## 2021-06-16 PROCEDURE — 83735 ASSAY OF MAGNESIUM: CPT | Performed by: INTERNAL MEDICINE

## 2021-06-16 PROCEDURE — 36415 COLL VENOUS BLD VENIPUNCTURE: CPT | Performed by: INTERNAL MEDICINE

## 2021-06-16 PROCEDURE — 80053 COMPREHEN METABOLIC PANEL: CPT | Performed by: INTERNAL MEDICINE

## 2021-06-16 PROCEDURE — 25000003 PHARM REV CODE 250: Performed by: INTERNAL MEDICINE

## 2021-06-16 PROCEDURE — 25000003 PHARM REV CODE 250: Performed by: PHYSICIAN ASSISTANT

## 2021-06-16 RX ORDER — OXYCODONE AND ACETAMINOPHEN 5; 325 MG/1; MG/1
1 TABLET ORAL EVERY 12 HOURS PRN
Qty: 10 TABLET | Refills: 0 | Status: SHIPPED | OUTPATIENT
Start: 2021-06-16 | End: 2023-02-03

## 2021-06-16 RX ORDER — COLCHICINE 0.6 MG/1
0.6 TABLET ORAL DAILY
Qty: 14 TABLET | Refills: 0 | Status: SHIPPED | OUTPATIENT
Start: 2021-06-17 | End: 2024-03-14 | Stop reason: SDUPTHER

## 2021-06-16 RX ORDER — DOXYCYCLINE HYCLATE 100 MG/1
100 TABLET, DELAYED RELEASE ORAL 2 TIMES DAILY
Qty: 42 TABLET | Refills: 0 | Status: SHIPPED | OUTPATIENT
Start: 2021-06-16 | End: 2021-07-07

## 2021-06-16 RX ADMIN — COLCHICINE 0.6 MG: 0.6 TABLET, FILM COATED ORAL at 08:06

## 2021-06-16 RX ADMIN — OXYCODONE HYDROCHLORIDE AND ACETAMINOPHEN 1 TABLET: 5; 325 TABLET ORAL at 02:06

## 2021-06-16 RX ADMIN — OXYCODONE HYDROCHLORIDE AND ACETAMINOPHEN 1 TABLET: 5; 325 TABLET ORAL at 08:06

## 2021-06-16 RX ADMIN — FAMOTIDINE 20 MG: 20 TABLET ORAL at 08:06

## 2021-06-16 RX ADMIN — METOPROLOL SUCCINATE 50 MG: 50 TABLET, FILM COATED, EXTENDED RELEASE ORAL at 08:06

## 2021-06-19 LAB
BACTERIA BLD CULT: NORMAL
BACTERIA BLD CULT: NORMAL

## 2021-06-22 ENCOUNTER — TELEPHONE (OUTPATIENT)
Dept: FAMILY MEDICINE | Facility: CLINIC | Age: 66
End: 2021-06-22

## 2021-07-06 RX ORDER — APIXABAN 5 MG/1
5 TABLET, FILM COATED ORAL 2 TIMES DAILY
Qty: 90 TABLET | Refills: 3 | Status: SHIPPED | OUTPATIENT
Start: 2021-07-06 | End: 2021-11-24 | Stop reason: SDUPTHER

## 2021-07-06 NOTE — TELEPHONE ENCOUNTER
----- Message from Juana Stephens sent at 7/6/2021  9:07 AM CDT -----  ELIQUIS 5 mg Tab   9/27/2019  Yes  Sig - Route: Take 5 mg by mouth 2 (two) times daily.  - Oral  Class: Historical Med  Order: [id number]  Date/Time Signed: 9/30/2019 09:41      Pharmacy      [place] [email or web address]      >>>>Pt said pharmacy has been requesting refill now hes going to be out today

## 2021-11-19 RX ORDER — LOSARTAN POTASSIUM 25 MG/1
25 TABLET ORAL DAILY
Qty: 30 TABLET | Refills: 0 | Status: SHIPPED | OUTPATIENT
Start: 2021-11-19 | End: 2021-11-24 | Stop reason: SDUPTHER

## 2021-11-19 RX ORDER — METOPROLOL SUCCINATE 50 MG/1
50 TABLET, EXTENDED RELEASE ORAL DAILY
Qty: 30 TABLET | Refills: 0 | Status: SHIPPED | OUTPATIENT
Start: 2021-11-19 | End: 2021-11-24 | Stop reason: SDUPTHER

## 2021-11-26 RX ORDER — METOPROLOL SUCCINATE 50 MG/1
50 TABLET, EXTENDED RELEASE ORAL DAILY
Qty: 30 TABLET | Refills: 0 | Status: SHIPPED | OUTPATIENT
Start: 2021-11-26 | End: 2022-02-25

## 2021-11-26 RX ORDER — LOSARTAN POTASSIUM 25 MG/1
25 TABLET ORAL DAILY
Qty: 30 TABLET | Refills: 0 | Status: SHIPPED | OUTPATIENT
Start: 2021-11-26 | End: 2021-12-29

## 2021-11-26 RX ORDER — APIXABAN 5 MG/1
5 TABLET, FILM COATED ORAL 2 TIMES DAILY
Qty: 90 TABLET | Refills: 3 | Status: SHIPPED | OUTPATIENT
Start: 2021-11-26 | End: 2022-02-14 | Stop reason: SDUPTHER

## 2021-11-26 RX ORDER — FUROSEMIDE 20 MG/1
20 TABLET ORAL DAILY
Qty: 30 TABLET | Refills: 3 | Status: SHIPPED | OUTPATIENT
Start: 2021-11-26 | End: 2022-10-17 | Stop reason: SDUPTHER

## 2021-11-26 RX ORDER — METOPROLOL SUCCINATE 50 MG/1
50 TABLET, EXTENDED RELEASE ORAL DAILY
Qty: 30 TABLET | Refills: 2 | Status: SHIPPED | OUTPATIENT
Start: 2021-11-26 | End: 2023-02-03 | Stop reason: SDUPTHER

## 2021-12-21 ENCOUNTER — OFFICE VISIT (OUTPATIENT)
Dept: URGENT CARE | Facility: CLINIC | Age: 66
End: 2021-12-21
Payer: MEDICARE

## 2021-12-21 VITALS
TEMPERATURE: 99 F | HEART RATE: 84 BPM | DIASTOLIC BLOOD PRESSURE: 77 MMHG | RESPIRATION RATE: 20 BRPM | WEIGHT: 258 LBS | HEIGHT: 73 IN | SYSTOLIC BLOOD PRESSURE: 142 MMHG | OXYGEN SATURATION: 95 % | BODY MASS INDEX: 34.19 KG/M2

## 2021-12-21 DIAGNOSIS — J40 BRONCHITIS: ICD-10-CM

## 2021-12-21 DIAGNOSIS — Z20.822 COVID-19 VIRUS NOT DETECTED: ICD-10-CM

## 2021-12-21 DIAGNOSIS — R50.9 FEVER, UNSPECIFIED FEVER CAUSE: ICD-10-CM

## 2021-12-21 DIAGNOSIS — R05.9 COUGH: Primary | ICD-10-CM

## 2021-12-21 LAB
CTP QC/QA: YES
CTP QC/QA: YES
FLUAV AG NPH QL: NEGATIVE
FLUBV AG NPH QL: NEGATIVE
SARS-COV-2 RDRP RESP QL NAA+PROBE: NEGATIVE

## 2021-12-21 PROCEDURE — 87804 POCT INFLUENZA A/B: ICD-10-PCS | Mod: QW,,, | Performed by: NURSE PRACTITIONER

## 2021-12-21 PROCEDURE — 4010F PR ACE/ARB THEARPY RXD/TAKEN: ICD-10-PCS | Mod: CPTII,S$GLB,, | Performed by: NURSE PRACTITIONER

## 2021-12-21 PROCEDURE — U0002: ICD-10-PCS | Mod: QW,S$GLB,, | Performed by: NURSE PRACTITIONER

## 2021-12-21 PROCEDURE — 99214 PR OFFICE/OUTPT VISIT, EST, LEVL IV, 30-39 MIN: ICD-10-PCS | Mod: 25,S$GLB,, | Performed by: NURSE PRACTITIONER

## 2021-12-21 PROCEDURE — 96372 PR INJECTION,THERAP/PROPH/DIAG2ST, IM OR SUBCUT: ICD-10-PCS | Mod: S$GLB,,, | Performed by: NURSE PRACTITIONER

## 2021-12-21 PROCEDURE — 87804 INFLUENZA ASSAY W/OPTIC: CPT | Mod: QW,,, | Performed by: NURSE PRACTITIONER

## 2021-12-21 PROCEDURE — 99214 OFFICE O/P EST MOD 30 MIN: CPT | Mod: 25,S$GLB,, | Performed by: NURSE PRACTITIONER

## 2021-12-21 PROCEDURE — 96372 THER/PROPH/DIAG INJ SC/IM: CPT | Mod: S$GLB,,, | Performed by: NURSE PRACTITIONER

## 2021-12-21 PROCEDURE — U0002 COVID-19 LAB TEST NON-CDC: HCPCS | Mod: QW,S$GLB,, | Performed by: NURSE PRACTITIONER

## 2021-12-21 PROCEDURE — 4010F ACE/ARB THERAPY RXD/TAKEN: CPT | Mod: CPTII,S$GLB,, | Performed by: NURSE PRACTITIONER

## 2021-12-21 RX ORDER — ALBUTEROL SULFATE 90 UG/1
2 AEROSOL, METERED RESPIRATORY (INHALATION) EVERY 6 HOURS PRN
Qty: 18 G | Refills: 0 | Status: SHIPPED | OUTPATIENT
Start: 2021-12-21

## 2021-12-21 RX ORDER — PREDNISONE 20 MG/1
20 TABLET ORAL 2 TIMES DAILY
Qty: 6 TABLET | Refills: 0 | Status: SHIPPED | OUTPATIENT
Start: 2021-12-21 | End: 2021-12-24

## 2021-12-21 RX ORDER — CODEINE PHOSPHATE AND GUAIFENESIN 10; 100 MG/5ML; MG/5ML
5 SOLUTION ORAL 3 TIMES DAILY PRN
Qty: 118 ML | Refills: 0 | Status: SHIPPED | OUTPATIENT
Start: 2021-12-21 | End: 2021-12-31

## 2021-12-21 RX ORDER — GUAIFENESIN 600 MG/1
1200 TABLET, EXTENDED RELEASE ORAL 2 TIMES DAILY
Qty: 40 TABLET | Refills: 0 | Status: SHIPPED | OUTPATIENT
Start: 2021-12-21 | End: 2021-12-31

## 2021-12-21 RX ORDER — IPRATROPIUM BROMIDE 0.5 MG/2.5ML
500 SOLUTION RESPIRATORY (INHALATION) EVERY 6 HOURS PRN
Qty: 75 ML | Refills: 0 | Status: SHIPPED | OUTPATIENT
Start: 2021-12-21

## 2021-12-21 RX ORDER — BENZONATATE 100 MG/1
100 CAPSULE ORAL 3 TIMES DAILY PRN
Qty: 30 CAPSULE | Refills: 0 | Status: SHIPPED | OUTPATIENT
Start: 2021-12-21 | End: 2021-12-31

## 2021-12-21 RX ORDER — DOXYCYCLINE 100 MG/1
100 CAPSULE ORAL 2 TIMES DAILY
Qty: 14 CAPSULE | Refills: 0 | Status: SHIPPED | OUTPATIENT
Start: 2021-12-21 | End: 2021-12-28

## 2021-12-21 RX ORDER — DEXAMETHASONE SODIUM PHOSPHATE 4 MG/ML
8 INJECTION, SOLUTION INTRA-ARTICULAR; INTRALESIONAL; INTRAMUSCULAR; INTRAVENOUS; SOFT TISSUE
Status: COMPLETED | OUTPATIENT
Start: 2021-12-21 | End: 2021-12-21

## 2021-12-21 RX ORDER — ALBUTEROL SULFATE 0.83 MG/ML
2.5 SOLUTION RESPIRATORY (INHALATION) EVERY 6 HOURS PRN
Qty: 25 EACH | Refills: 0 | Status: SHIPPED | OUTPATIENT
Start: 2021-12-21 | End: 2022-12-21

## 2021-12-21 RX ADMIN — DEXAMETHASONE SODIUM PHOSPHATE 8 MG: 4 INJECTION, SOLUTION INTRA-ARTICULAR; INTRALESIONAL; INTRAMUSCULAR; INTRAVENOUS; SOFT TISSUE at 11:12

## 2022-02-14 RX ORDER — APIXABAN 5 MG/1
5 TABLET, FILM COATED ORAL 2 TIMES DAILY
Qty: 90 TABLET | Refills: 3 | Status: SHIPPED | OUTPATIENT
Start: 2022-02-14 | End: 2022-10-17 | Stop reason: SDUPTHER

## 2022-02-14 NOTE — TELEPHONE ENCOUNTER
----- Message from Julia Das sent at 2/14/2022 10:04 AM CST -----  Type:  RX Refill Request    Who Called:  Patient  Refill or New Rx:  Refill  RX Name and Strength:  ELIQUIS 5 mg Tab  How is the patient currently taking it? (ex. 1XDay):  Take 1 tablet (5 mg total) by mouth 2 (two) times daily. - Oral  Is this a 30 day or 90 day RX:  90 tablet  Preferred Pharmacy with phone number:    [facility] [address] [address]  Phone: [phone] Fax: [phone]  Local or Mail Order:  local  Ordering Provider:  Rocio Mak NP  Best Call Back Number:  [phone]

## 2022-03-22 ENCOUNTER — OFFICE VISIT (OUTPATIENT)
Dept: PAIN MEDICINE | Facility: CLINIC | Age: 67
End: 2022-03-22
Payer: MEDICARE

## 2022-03-22 ENCOUNTER — TELEPHONE (OUTPATIENT)
Dept: PAIN MEDICINE | Facility: CLINIC | Age: 67
End: 2022-03-22

## 2022-03-22 VITALS
HEART RATE: 70 BPM | DIASTOLIC BLOOD PRESSURE: 94 MMHG | BODY MASS INDEX: 34.19 KG/M2 | WEIGHT: 258 LBS | SYSTOLIC BLOOD PRESSURE: 141 MMHG | HEIGHT: 73 IN

## 2022-03-22 DIAGNOSIS — M51.36 DDD (DEGENERATIVE DISC DISEASE), LUMBAR: ICD-10-CM

## 2022-03-22 DIAGNOSIS — M47.896 OTHER SPONDYLOSIS, LUMBAR REGION: ICD-10-CM

## 2022-03-22 DIAGNOSIS — M48.00 CENTRAL STENOSIS OF SPINAL CANAL: Primary | ICD-10-CM

## 2022-03-22 PROCEDURE — 99204 OFFICE O/P NEW MOD 45 MIN: CPT | Mod: S$GLB,,, | Performed by: ANESTHESIOLOGY

## 2022-03-22 PROCEDURE — 99204 PR OFFICE/OUTPT VISIT, NEW, LEVL IV, 45-59 MIN: ICD-10-PCS | Mod: S$GLB,,, | Performed by: ANESTHESIOLOGY

## 2022-03-22 PROCEDURE — 99999 PR PBB SHADOW E&M-EST. PATIENT-LVL III: ICD-10-PCS | Mod: PBBFAC,,, | Performed by: ANESTHESIOLOGY

## 2022-03-22 PROCEDURE — 99999 PR PBB SHADOW E&M-EST. PATIENT-LVL III: CPT | Mod: PBBFAC,,, | Performed by: ANESTHESIOLOGY

## 2022-03-22 NOTE — PROGRESS NOTES
Referring Physician: Stephane, Aaareferral    PCP: Primary Doctor No    CC: back pain    HPI:   Gregorio Guerra is a 66 y.o. male with PMH significant for hx of right hip arthroplasty, atrial fibrillation on Eliquis, hx of right arthroscopic knee surgery, and HTN presents for the evaluation of back pain. The patient reports that his pain began approximately 30 years ago after no inciting incident or trauma. The patient reports of worsening pain since onset. The patient localizes his pain to area across his lower back. The patient denies of radiation of his back pain. The patient describes his pain as a constant type of pain. The patient reports of numbness in the bottom of his left foot. The patient reports that his pain is a 4/10. The patient reports that his pain can increase to a 10/10 at its worst. Patient denies of any urinary/fecal incontinence, saddle anesthesia, or weakness.     Aggravating factors: bending, lifting items     Mitigating factors: rest    Relevant Surgeries: no    Interventional Therapies: no    : Not applicable        Non-pharmacologic Treatment:     · Physical Therapy: yes; reports of some benefit but denies of improvement of functional capacity  · Ice/Heat: yes; denies of meaningful relief. Heat with some improvement of pain.   · TENS: yes; did at the chiropractor and while in PT.  · Massage: no  · Chiropractic care: yes; did for over twenty years. Worsened pain at the last session.   · Acupuncture: no         Pain Medications:         · Currently taking: OTC Tylenol, daily marijuana use     · Has tried in the past:    · Opioids: yes  · NSAIDS: no; avoids secondary to anticoagulation status  · Tylenol: yes  · Muscle relaxants: yes; tried from a family member with some mild relief  · TCAs: no  · SNRIs: no  · Anticonvulsants: no  · topical creams: yes; lidocaine patches    Anticoagulation: yes; eliquis    ROS:  Review of Systems   Constitutional: Negative for chills and fever.   HENT:  "Negative for tinnitus.    Eyes: Negative for visual disturbance.   Respiratory: Negative for shortness of breath.    Cardiovascular: Negative for chest pain.   Gastrointestinal: Negative for nausea and vomiting.   Genitourinary: Negative for difficulty urinating.   Musculoskeletal: Positive for back pain.   Skin: Negative for rash.   Allergic/Immunologic: Negative for immunocompromised state.   Neurological: Positive for numbness. Negative for syncope.   Hematological: Does not bruise/bleed easily.   Psychiatric/Behavioral: Negative for suicidal ideas.        Past Medical History:   Diagnosis Date    A-fib     Arthritis     Current use of long term anticoagulation 2/1/2019    Hypertension     Septic olecranon bursitis, left 6/14/2021     Past Surgical History:   Procedure Laterality Date    CARDIAC SURGERY      Correct abnormal heart rhythm    EXPLORATORY LAPAROTOMY  1986    KNEE SURGERY      TOTAL HIP ARTHROPLASTY Right 10/29/2018    anterior approach (depuy hardware)    TOTAL HIP ARTHROPLASTY Right     TREATMENT OF CARDIAC ARRHYTHMIA N/A 6/26/2020    Procedure: CARDIOVERSION;  Surgeon: Rogelio Pa MD;  [facility] CATH[facility]EP LAB;  Service: Cardiology;  Laterality: N/A;     Family History   Problem Relation Age of Onset    Breast cancer Mother     Breast cancer Sister      Social History     Socioeconomic History    Marital status:    Tobacco Use    Smoking status: Current Every Day Smoker     Packs/day: 0.50     Types: Cigarettes    Smokeless tobacco: Never Used   Substance and Sexual Activity    Alcohol use: Yes    Drug use: No    Sexual activity: Yes     Partners: Female     Allergies: See med card    Vitals:    03/22/22 0825   BP: (!) 141/94   Pulse: 70   Weight: 117 kg (258 lb)   Height: 6' 1" (1.854 m)   PainSc:   5   PainLoc: Back     Physical exam:  Physical Exam  Vitals and nursing note reviewed.   Constitutional:       Appearance: He is not diaphoretic.   HENT:      " Head: Normocephalic and atraumatic.   Eyes:      General:         Right eye: No discharge.         Left eye: No discharge.      Conjunctiva/sclera: Conjunctivae normal.   Cardiovascular:      Rate and Rhythm: Normal rate.   Pulmonary:      Effort: Pulmonary effort is normal. No respiratory distress.      Breath sounds: Normal breath sounds.   Abdominal:      Palpations: Abdomen is soft.   Skin:     General: Skin is warm and dry.      Findings: No rash.   Neurological:      Mental Status: He is alert and oriented to person, place, and time.   Psychiatric:         Mood and Affect: Mood and affect normal.         Cognition and Memory: Memory normal.         Judgment: Judgment normal.        UPPER EXTREMITIES: Normal alignment, normal range of motion, no atrophy, no skin changes,  hair growth and nail growth normal and equal bilaterally. No swelling, no tenderness.    LOWER EXTREMITIES:  Normal alignment, normal range of motion, no atrophy, no skin changes,  hair growth and nail growth normal and equal bilaterally. No swelling, no tenderness.    LUMBAR SPINE  Lumbar spine: ROM is full with flexion extension and oblique extension with increased pain with extension     ((--)) Supine straight leg raise    ((+)) Facet loading   Internal and external rotation of the hip causes no increased pain on either side.  Myofascial exam: Tenderness to palpation across lumbar paraspinous muscles.    ((+)) TTP at the SI joint on the left  ((--)) YAMILETH's test  ((--)) One leg stand    ((--)) Distraction test  ((--)) Thigh thrust    CRANIAL NERVES:  II:  PERRL bilaterally,   III,IV,VI: EOMI.    V:  Facial sensation equal bilaterally  VII:  Facial motor function normal.  VIII:  Hearing equal to finger rub bilaterally  IX/X: Gag normal, palate symmetric  XI:  Shoulder shrug equal, head turn equal  XII:  Tongue midline without fasciculations    MOTOR: Tone and bulk: normal     MUSCLE STRENGTH:  Hip Flexion: Right 5/5, Left 5/5  Hip Extension:  Right 5/5, Left 5/5  Leg Flexion: Right 5/5, Left 5/5  Leg Extension: Right 5/5, Left 5/5  Plantar Flexion: Right 5/5, Left 5/5  Dorsiflexion: Right 5/5, Left 5/5    SENSATION: Light touch and pinprick intact bilaterally  REFLEXES: normal, symmetric, nonbrisk.  Toes down, no clonus. Negative mcduffie's sign bilaterally.  GAIT: antalgic gait    Imaging: CT Lumbar Spine without contrast (10/3/2019):   There is 1-2 mm retrolisthesis of L3 on L4.  Otherwise, lumbar spine alignment is within normal limits.The vertebral body heights are well-maintained.No fractures are identified.  Multilevel degenerative disc disease is noted, most pronounced at L2-3, L3-4 and L5-S1.     The surrounding visualized paravertebral soft tissue structures demonstrate no pathology. Limited views of the abdomen demonstrate bilateral renal calculi, with approximately 5 stones in the right kidney and 2 in the left kidney.  The largest on the right kidney is located in the lower pole measures approximately 4 mm.  The largest stone in the left kidney is located in the lower pole multiple and measures 6 mm.  There is mild calcific atherosclerotic calcification of the abdominal aorta and branch vessels.  No aortic aneurysm.     L1-2: Mild diffuse disc bulge.  Mild bilateral facet arthropathy.  Ligamentum flavum infolding.  Mild bilateral neural foraminal stenosis.  Mild spinal canal stenosis.     L2-3: Diffuse disc bulge.  Mild bilateral facet arthropathy.  Ligamentum flavum infolding.  Moderate bilateral neural foraminal stenosis.  Severe spinal canal stenosis.     L3-4: Diffuse disc bulge with superimposed central disc extrusion with slight caudal migration of disc material to the pedicular level.  Mild bilateral facet arthropathy.  Ligamentum flavum infolding.  Severe right and moderate left neural foraminal stenosis.  Severe spinal canal stenosis.     L4-5: Diffuse disc bulge with superimposed central disc extrusion with slight caudal migration of  disc material to the pedicular level.  Mild bilateral facet arthropathy.  Severe right and moderate left neural foraminal stenosis.  Moderate to severe spinal canal stenosis.     L5-S1: Posterior disc osteophyte complex.  Moderate bilateral facet arthropathy.  Moderate bilateral neural foraminal stenosis.  There is no spinal canal stenosis.     Impression:     1. Multilevel degenerative changes of the lumbar spine, as described above, most pronounced at L2-3 and L4-5 with severe spinal canal stenosis.  2. Bilateral nonobstructive nephrolithiasis.     Assessment: Gregorio Guerra is a 66 y.o. male with PMH significant for hx of right hip arthroplasty, atrial fibrillation on Eliquis, hx of right arthroscopic knee surgery, and HTN presents for the evaluation of back pain. CT of the lumbar spine reviewed with the patient personally. CT significant for multi-level severe spinal canal stenosis, DDD, and facet arthropathy. I suspect the majority of his pain is secondary to severe spinal canal stenosis. Treatment plan outlined below.     Plan:  - Schedule for L3-L4 lumbar interlaminar epidural steroid injection. Patient will require clearance hold his Eliquis prior to his procedure  - I have stressed the importance of physical activity and a home exercise plan to help with chronic pain and improve health.  - Okay to continue daily marijuana use for chronic pain  - RTC for the procedure as outlined above    Thank you for referring this interesting patient, and I look forward to continuing to collaborate in his care.    Lita Billings MD  Pain Management

## 2022-03-22 NOTE — H&P (VIEW-ONLY)
Referring Physician: Stephane, Aaareferral    PCP: Primary Doctor No    CC: back pain    HPI:   Gregorio Guerra is a 66 y.o. male with PMH significant for hx of right hip arthroplasty, atrial fibrillation on Eliquis, hx of right arthroscopic knee surgery, and HTN presents for the evaluation of back pain. The patient reports that his pain began approximately 30 years ago after no inciting incident or trauma. The patient reports of worsening pain since onset. The patient localizes his pain to area across his lower back. The patient denies of radiation of his back pain. The patient describes his pain as a constant type of pain. The patient reports of numbness in the bottom of his left foot. The patient reports that his pain is a 4/10. The patient reports that his pain can increase to a 10/10 at its worst. Patient denies of any urinary/fecal incontinence, saddle anesthesia, or weakness.     Aggravating factors: bending, lifting items     Mitigating factors: rest    Relevant Surgeries: no    Interventional Therapies: no    : Not applicable        Non-pharmacologic Treatment:     · Physical Therapy: yes; reports of some benefit but denies of improvement of functional capacity  · Ice/Heat: yes; denies of meaningful relief. Heat with some improvement of pain.   · TENS: yes; did at the chiropractor and while in PT.  · Massage: no  · Chiropractic care: yes; did for over twenty years. Worsened pain at the last session.   · Acupuncture: no         Pain Medications:         · Currently taking: OTC Tylenol, daily marijuana use     · Has tried in the past:    · Opioids: yes  · NSAIDS: no; avoids secondary to anticoagulation status  · Tylenol: yes  · Muscle relaxants: yes; tried from a family member with some mild relief  · TCAs: no  · SNRIs: no  · Anticonvulsants: no  · topical creams: yes; lidocaine patches    Anticoagulation: yes; eliquis    ROS:  Review of Systems   Constitutional: Negative for chills and fever.   HENT:  "Negative for tinnitus.    Eyes: Negative for visual disturbance.   Respiratory: Negative for shortness of breath.    Cardiovascular: Negative for chest pain.   Gastrointestinal: Negative for nausea and vomiting.   Genitourinary: Negative for difficulty urinating.   Musculoskeletal: Positive for back pain.   Skin: Negative for rash.   Allergic/Immunologic: Negative for immunocompromised state.   Neurological: Positive for numbness. Negative for syncope.   Hematological: Does not bruise/bleed easily.   Psychiatric/Behavioral: Negative for suicidal ideas.        Past Medical History:   Diagnosis Date    A-fib     Arthritis     Current use of long term anticoagulation 2/1/2019    Hypertension     Septic olecranon bursitis, left 6/14/2021     Past Surgical History:   Procedure Laterality Date    CARDIAC SURGERY      Correct abnormal heart rhythm    EXPLORATORY LAPAROTOMY  1986    KNEE SURGERY      TOTAL HIP ARTHROPLASTY Right 10/29/2018    anterior approach (depuy hardware)    TOTAL HIP ARTHROPLASTY Right     TREATMENT OF CARDIAC ARRHYTHMIA N/A 6/26/2020    Procedure: CARDIOVERSION;  Surgeon: Rogelio Pa MD;  Location: [facility] CATH/EP LAB;  Service: Cardiology;  Laterality: N/A;     Family History   Problem Relation Age of Onset    Breast cancer Mother     Breast cancer Sister      Social History     Socioeconomic History    Marital status:    Tobacco Use    Smoking status: Current Every Day Smoker     Packs/day: 0.50     Types: Cigarettes    Smokeless tobacco: Never Used   Substance and Sexual Activity    Alcohol use: Yes    Drug use: No    Sexual activity: Yes     Partners: Female     Allergies: See med card    Vitals:    03/22/22 0825   BP: (!) 141/94   Pulse: 70   Weight: 117 kg (258 lb)   Height: 6' 1" (1.854 m)   PainSc:   5   PainLoc: Back     Physical exam:  Physical Exam  Vitals and nursing note reviewed.   Constitutional:       Appearance: He is not diaphoretic.   HENT:      " Head: Normocephalic and atraumatic.   Eyes:      General:         Right eye: No discharge.         Left eye: No discharge.      Conjunctiva/sclera: Conjunctivae normal.   Cardiovascular:      Rate and Rhythm: Normal rate.   Pulmonary:      Effort: Pulmonary effort is normal. No respiratory distress.      Breath sounds: Normal breath sounds.   Abdominal:      Palpations: Abdomen is soft.   Skin:     General: Skin is warm and dry.      Findings: No rash.   Neurological:      Mental Status: He is alert and oriented to person, place, and time.   Psychiatric:         Mood and Affect: Mood and affect normal.         Cognition and Memory: Memory normal.         Judgment: Judgment normal.        UPPER EXTREMITIES: Normal alignment, normal range of motion, no atrophy, no skin changes,  hair growth and nail growth normal and equal bilaterally. No swelling, no tenderness.    LOWER EXTREMITIES:  Normal alignment, normal range of motion, no atrophy, no skin changes,  hair growth and nail growth normal and equal bilaterally. No swelling, no tenderness.    LUMBAR SPINE  Lumbar spine: ROM is full with flexion extension and oblique extension with increased pain with extension     ((--)) Supine straight leg raise    ((+)) Facet loading   Internal and external rotation of the hip causes no increased pain on either side.  Myofascial exam: Tenderness to palpation across lumbar paraspinous muscles.    ((+)) TTP at the SI joint on the left  ((--)) YAMILETH's test  ((--)) One leg stand    ((--)) Distraction test  ((--)) Thigh thrust    CRANIAL NERVES:  II:  PERRL bilaterally,   III,IV,VI: EOMI.    V:  Facial sensation equal bilaterally  VII:  Facial motor function normal.  VIII:  Hearing equal to finger rub bilaterally  IX/X: Gag normal, palate symmetric  XI:  Shoulder shrug equal, head turn equal  XII:  Tongue midline without fasciculations    MOTOR: Tone and bulk: normal     MUSCLE STRENGTH:  Hip Flexion: Right 5/5, Left 5/5  Hip Extension:  Right 5/5, Left 5/5  Leg Flexion: Right 5/5, Left 5/5  Leg Extension: Right 5/5, Left 5/5  Plantar Flexion: Right 5/5, Left 5/5  Dorsiflexion: Right 5/5, Left 5/5    SENSATION: Light touch and pinprick intact bilaterally  REFLEXES: normal, symmetric, nonbrisk.  Toes down, no clonus. Negative mcduffie's sign bilaterally.  GAIT: antalgic gait    Imaging: CT Lumbar Spine without contrast (10/3/2019):   There is 1-2 mm retrolisthesis of L3 on L4.  Otherwise, lumbar spine alignment is within normal limits.The vertebral body heights are well-maintained.No fractures are identified.  Multilevel degenerative disc disease is noted, most pronounced at L2-3, L3-4 and L5-S1.     The surrounding visualized paravertebral soft tissue structures demonstrate no pathology. Limited views of the abdomen demonstrate bilateral renal calculi, with approximately 5 stones in the right kidney and 2 in the left kidney.  The largest on the right kidney is located in the lower pole measures approximately 4 mm.  The largest stone in the left kidney is located in the lower pole multiple and measures 6 mm.  There is mild calcific atherosclerotic calcification of the abdominal aorta and branch vessels.  No aortic aneurysm.     L1-2: Mild diffuse disc bulge.  Mild bilateral facet arthropathy.  Ligamentum flavum infolding.  Mild bilateral neural foraminal stenosis.  Mild spinal canal stenosis.     L2-3: Diffuse disc bulge.  Mild bilateral facet arthropathy.  Ligamentum flavum infolding.  Moderate bilateral neural foraminal stenosis.  Severe spinal canal stenosis.     L3-4: Diffuse disc bulge with superimposed central disc extrusion with slight caudal migration of disc material to the pedicular level.  Mild bilateral facet arthropathy.  Ligamentum flavum infolding.  Severe right and moderate left neural foraminal stenosis.  Severe spinal canal stenosis.     L4-5: Diffuse disc bulge with superimposed central disc extrusion with slight caudal migration of  disc material to the pedicular level.  Mild bilateral facet arthropathy.  Severe right and moderate left neural foraminal stenosis.  Moderate to severe spinal canal stenosis.     L5-S1: Posterior disc osteophyte complex.  Moderate bilateral facet arthropathy.  Moderate bilateral neural foraminal stenosis.  There is no spinal canal stenosis.     Impression:     1. Multilevel degenerative changes of the lumbar spine, as described above, most pronounced at L2-3 and L4-5 with severe spinal canal stenosis.  2. Bilateral nonobstructive nephrolithiasis.     Assessment: Gregorio Guerra is a 66 y.o. male with PMH significant for hx of right hip arthroplasty, atrial fibrillation on Eliquis, hx of right arthroscopic knee surgery, and HTN presents for the evaluation of back pain. CT of the lumbar spine reviewed with the patient personally. CT significant for multi-level severe spinal canal stenosis, DDD, and facet arthropathy. I suspect the majority of his pain is secondary to severe spinal canal stenosis. Treatment plan outlined below.     Plan:  - Schedule for L3-L4 lumbar interlaminar epidural steroid injection. Patient will require clearance hold his Eliquis prior to his procedure  - I have stressed the importance of physical activity and a home exercise plan to help with chronic pain and improve health.  - Okay to continue daily marijuana use for chronic pain  - RTC for the procedure as outlined above    Thank you for referring this interesting patient, and I look forward to continuing to collaborate in his care.    Lita Billings MD  Pain Management

## 2022-03-22 NOTE — TELEPHONE ENCOUNTER
Needs clearance to hold Eliquis x3 days prior to procedure with Dr. kendrick on 04/04/22. Ok to hold?

## 2022-03-23 ENCOUNTER — TELEPHONE (OUTPATIENT)
Dept: CARDIOLOGY | Facility: CLINIC | Age: 67
End: 2022-03-23
Payer: MEDICARE

## 2022-03-29 ENCOUNTER — TELEPHONE (OUTPATIENT)
Dept: PAIN MEDICINE | Facility: CLINIC | Age: 67
End: 2022-03-29
Payer: MEDICARE

## 2022-04-01 NOTE — DISCHARGE INSTRUCTIONS
PLEASE MAKE SURE YOU HAVE ALL OF YOUR BELONGINGS BEFORE LEAVING     Pain injection instructions:     This procedure may take a couple weeks to relieve pain  You may get some pain relief from the local anesthetic initally.   Steroids can have side effects of flushed face or nervous feeling.    No driving for 24 hrs.   Activity as tolerated- gradually increase activities.  Dont lift over 10 lbs for 24 hrs   No heat at injection sites for 2 full days. No heating pads, hot tubs, saunas, or swimming in any body of water or pool for 2 full days.  Use ice pack for mild swelling and for comfort , apply for 20 minutes, remove for 20 minute intervals. No direct contact of ice itself  to skin.  May shower today.  Do not allow shower water to beat on injections site(s) for 2 full days. No tub baths for two full days.      Resume Aspirin, Plavix, or Coumadin the day after the procedure unless otherwise instructed.   If diabetic,monitor your glucose carefully as steroids can increase your glucose level    Seek immediate medical help for:   Severe increase in your usual pain or appearance of new pain.  Prolonged (more than 8 hours) or increasing weakness or numbness in the legs or arms.   .    Fever above 100.4 degrees F ,Drainage,redness,active bleeding, or increased swelling at the injection site.  Headache, shortness of breath, chest pain, or breathing problems.    After Surgery:  Always be aware that any surgery can cause these symptoms:    Pain- Medication can be prescribed for pain to decrease your pain but may not completely take your pain away. Over the Counter pain medicine my be enough and you can always use Ice and rest to help ease pain.    Bleeding- a little bleeding after a surgery is usually within normal.  If there is a lot of blood you need to notify your MD.  Emergency treatments of bleeding are cold application, elevation of the bleeding site and compression.    Infection- Infection after surgery is NOT a normal  occurrence.  Signs of infection are fever, swelling, hot to touch the incision.  If this occurs notify your MD immediately.    Nausea- this can be common after a surgery especially if you have had anesthesia medicine or are taking pain medicine.  Steroids have a side effect of nausea sometimes. Staying on clear liquids, bland foods, gingerale, or over the counter anti nausea medicines can help.  If you vomit more than once, notify your MD.  Anti Nausea medicines can be prescribed.

## 2022-04-04 ENCOUNTER — HOSPITAL ENCOUNTER (OUTPATIENT)
Facility: AMBULARY SURGERY CENTER | Age: 67
Discharge: HOME | End: 2022-04-04
Attending: ANESTHESIOLOGY | Admitting: ANESTHESIOLOGY
Payer: MEDICARE

## 2022-04-04 VITALS
HEART RATE: 75 BPM | SYSTOLIC BLOOD PRESSURE: 140 MMHG | WEIGHT: 257.94 LBS | DIASTOLIC BLOOD PRESSURE: 78 MMHG | TEMPERATURE: 97 F | OXYGEN SATURATION: 96 % | RESPIRATION RATE: 18 BRPM | BODY MASS INDEX: 34.03 KG/M2

## 2022-04-04 DIAGNOSIS — M51.36 DEGENERATIVE DISC DISEASE, LUMBAR: Primary | ICD-10-CM

## 2022-04-04 PROCEDURE — 62323 NJX INTERLAMINAR LMBR/SAC: CPT | Mod: ,,, | Performed by: ANESTHESIOLOGY

## 2022-04-04 PROCEDURE — 62323 PR INJ LUMBAR/SACRAL, W/IMAGING GUIDANCE: ICD-10-PCS | Mod: ,,, | Performed by: ANESTHESIOLOGY

## 2022-04-04 PROCEDURE — 62323 NJX INTERLAMINAR LMBR/SAC: CPT | Performed by: ANESTHESIOLOGY

## 2022-04-04 RX ORDER — SODIUM CHLORIDE, SODIUM LACTATE, POTASSIUM CHLORIDE, CALCIUM CHLORIDE 600; 310; 30; 20 MG/100ML; MG/100ML; MG/100ML; MG/100ML
INJECTION, SOLUTION INTRAVENOUS ONCE AS NEEDED
Status: COMPLETED | OUTPATIENT
Start: 2022-04-04 | End: 2022-04-04

## 2022-04-04 RX ORDER — SODIUM CHLORIDE 0.9 % (FLUSH) 0.9 %
SYRINGE (ML) INJECTION
Status: DISCONTINUED | OUTPATIENT
Start: 2022-04-04 | End: 2022-04-04 | Stop reason: HOSPADM

## 2022-04-04 RX ORDER — DEXAMETHASONE SODIUM PHOSPHATE 10 MG/ML
INJECTION INTRAMUSCULAR; INTRAVENOUS
Status: DISCONTINUED | OUTPATIENT
Start: 2022-04-04 | End: 2022-04-04 | Stop reason: HOSPADM

## 2022-04-04 RX ORDER — FENTANYL CITRATE 50 UG/ML
INJECTION, SOLUTION INTRAMUSCULAR; INTRAVENOUS
Status: DISCONTINUED | OUTPATIENT
Start: 2022-04-04 | End: 2022-04-04 | Stop reason: HOSPADM

## 2022-04-04 RX ORDER — MIDAZOLAM HYDROCHLORIDE 1 MG/ML
INJECTION, SOLUTION INTRAMUSCULAR; INTRAVENOUS
Status: DISCONTINUED | OUTPATIENT
Start: 2022-04-04 | End: 2022-04-04 | Stop reason: HOSPADM

## 2022-04-04 RX ORDER — LIDOCAINE HYDROCHLORIDE 10 MG/ML
INJECTION, SOLUTION EPIDURAL; INFILTRATION; INTRACAUDAL; PERINEURAL
Status: DISCONTINUED | OUTPATIENT
Start: 2022-04-04 | End: 2022-04-04 | Stop reason: HOSPADM

## 2022-04-04 RX ADMIN — SODIUM CHLORIDE, SODIUM LACTATE, POTASSIUM CHLORIDE, CALCIUM CHLORIDE: 600; 310; 30; 20 INJECTION, SOLUTION INTRAVENOUS at 12:04

## 2022-04-04 NOTE — PLAN OF CARE
Discharge instructions given to pt, verbalized understanding.  Able to tolerate PO fluids.  IV removed.  No c/o pain.  Ambulating out to girlfriend  per RN in no distress.

## 2022-04-04 NOTE — DISCHARGE SUMMARY
[facility]  Discharge Note  Short Stay    Procedure(s) (LRB):  Injection, Steroid, Epidural (N/A)    OUTCOME: Patient tolerated treatment/procedure well without complication and is now ready for discharge.    DISPOSITION: Home or Self Care    FINAL DIAGNOSIS:  Degenerative disc disease, lumbar    FOLLOWUP: In clinic    DISCHARGE INSTRUCTIONS:    Discharge Procedure Orders   Diet general     Call MD for:  temperature >100.4     Call MD for:  persistent nausea and vomiting     Call MD for:  severe uncontrolled pain     Call MD for:  difficulty breathing, headache or visual disturbances     Call MD for:  redness, tenderness, or signs of infection (pain, swelling, redness, odor or green/yellow discharge around incision site)     Call MD for:  hives     Call MD for:  persistent dizziness or light-headedness     Call MD for:  extreme fatigue        TIME SPENT ON DISCHARGE: 30 minutes

## 2022-04-04 NOTE — OP NOTE
PROCEDURE DATE: 4/4/2022    PROCEDURE:  Lumbar interlaminar epidural steroid injection at L3-L4 under fluoroscopic guidance.    DIAGNOSIS: Lumbar radiculopathy  POSTOP DIAGNOSIS: SAME    PHYSICIAN: Lita Billings M.D.    MEDICATIONS INJECTED: 10 mg dexamethasone with 4 ml of preservative free NaCl    LOCAL ANESTHETIC INJECTED:    Lidocaine 1% 4 ml total    SEDATION MEDICATIONS: RN IV sedation    ESTIMATED BLOOD LOSS:  none    COMPLICATIONS:  none    TECHNIQUE:  Time-out taken to identify patient and procedure prior to starting the procedure.  With the patient laying in a prone position, the area was prepped and draped in the usual sterile fashion using ChloraPrep and a fenestrated drape.  After determining the target level with an AP fluoroscopic view, local anesthetic was given using a 25-gauge 1.5 inch needle by raising a wheal and then infiltrating toward the interlaminar entry space.  A 3.5 inch 20-gauge Touhy needle was introduced under AP fluoroscopic guidance to the interlaminar space of L3-L4. Once the trajectory was established, the needle was visualized in the lateral view and advanced using loss of resistance technique. Once in the desired position, 2 mL contrast was injected to confirm placement and there was no vascular uptake nor intrathecal spread.  The medication was then injected slowly. The patient tolerated the procedure well.      The patient was monitored after the procedure.   They were given post-procedure and discharge instructions to follow at home.  The patient was discharged in a stable condition.

## 2022-05-03 ENCOUNTER — TELEPHONE (OUTPATIENT)
Dept: PAIN MEDICINE | Facility: CLINIC | Age: 67
End: 2022-05-03
Payer: MEDICARE

## 2022-05-03 NOTE — TELEPHONE ENCOUNTER
Spoke with pt advised that injections that need to go to the [facility] if they need x ray guidance. However Dr kendrick can examine his hip and see what is needed

## 2022-05-03 NOTE — TELEPHONE ENCOUNTER
----- Message from Susy Flores sent at 5/3/2022  9:17 AM CDT -----  Contact: Pt  Type: Needs Medical Advice    Who Called:Pt  Best Call Back Number:[phone]    Additional Information Requesting a call back regarding Pt was calling in regards to upcoming appt pt wanted to speak with office about seeing if they can squeeze in kash injection that day as well pt stated to please call when available Thank you  Please Advise-Thank you

## 2022-05-06 ENCOUNTER — TELEPHONE (OUTPATIENT)
Dept: PAIN MEDICINE | Facility: CLINIC | Age: 67
End: 2022-05-06
Payer: MEDICARE

## 2022-05-06 NOTE — TELEPHONE ENCOUNTER
----- Message from Fatuma Jules sent at 5/6/2022  1:38 PM CDT -----  Type:  Sooner Appointment Request    Caller is requesting a sooner appointment.  Caller declined first available appointment listed below.  Caller will not accept being placed on the waitlist and is requesting a message be sent to doctor.    Name of Caller:  patient   When is the first available appointment?  6/16  Symptoms:  follow up  Best Call Back Number:  [phone]   Additional Information:  please advise-thank you

## 2022-05-24 ENCOUNTER — OFFICE VISIT (OUTPATIENT)
Dept: PAIN MEDICINE | Facility: CLINIC | Age: 67
End: 2022-05-24
Payer: MEDICARE

## 2022-05-24 VITALS
WEIGHT: 257 LBS | DIASTOLIC BLOOD PRESSURE: 76 MMHG | BODY MASS INDEX: 34.06 KG/M2 | HEIGHT: 73 IN | HEART RATE: 87 BPM | SYSTOLIC BLOOD PRESSURE: 142 MMHG

## 2022-05-24 DIAGNOSIS — M70.62 GREATER TROCHANTERIC BURSITIS OF LEFT HIP: ICD-10-CM

## 2022-05-24 DIAGNOSIS — M51.36 DDD (DEGENERATIVE DISC DISEASE), LUMBAR: ICD-10-CM

## 2022-05-24 DIAGNOSIS — M48.00 CENTRAL STENOSIS OF SPINAL CANAL: Primary | ICD-10-CM

## 2022-05-24 DIAGNOSIS — M47.896 OTHER SPONDYLOSIS, LUMBAR REGION: ICD-10-CM

## 2022-05-24 DIAGNOSIS — M54.16 LUMBAR RADICULOPATHY, CHRONIC: ICD-10-CM

## 2022-05-24 PROCEDURE — 99214 PR OFFICE/OUTPT VISIT, EST, LEVL IV, 30-39 MIN: ICD-10-PCS | Mod: 25,S$GLB,, | Performed by: ANESTHESIOLOGY

## 2022-05-24 PROCEDURE — 99999 PR PBB SHADOW E&M-EST. PATIENT-LVL IV: ICD-10-PCS | Mod: PBBFAC,,, | Performed by: ANESTHESIOLOGY

## 2022-05-24 PROCEDURE — 20610 DRAIN/INJ JOINT/BURSA W/O US: CPT | Mod: LT,S$GLB,, | Performed by: ANESTHESIOLOGY

## 2022-05-24 PROCEDURE — 20610 LARGE JOINT ASPIRATION/INJECTION: L GREATER TROCHANTERIC BURSA: ICD-10-PCS | Mod: LT,S$GLB,, | Performed by: ANESTHESIOLOGY

## 2022-05-24 PROCEDURE — 99214 OFFICE O/P EST MOD 30 MIN: CPT | Mod: 25,S$GLB,, | Performed by: ANESTHESIOLOGY

## 2022-05-24 PROCEDURE — 99999 PR PBB SHADOW E&M-EST. PATIENT-LVL IV: CPT | Mod: PBBFAC,,, | Performed by: ANESTHESIOLOGY

## 2022-05-24 RX ORDER — METHYLPREDNISOLONE ACETATE 40 MG/ML
40 INJECTION, SUSPENSION INTRA-ARTICULAR; INTRALESIONAL; INTRAMUSCULAR; SOFT TISSUE
Status: DISCONTINUED | OUTPATIENT
Start: 2022-05-24 | End: 2022-05-24 | Stop reason: HOSPADM

## 2022-05-24 RX ORDER — HYDROCODONE BITARTRATE AND ACETAMINOPHEN 7.5; 325 MG/1; MG/1
1 TABLET ORAL EVERY 6 HOURS PRN
Qty: 30 TABLET | Refills: 0 | Status: SHIPPED | OUTPATIENT
Start: 2022-05-24 | End: 2022-06-20 | Stop reason: SDUPTHER

## 2022-05-24 RX ADMIN — METHYLPREDNISOLONE ACETATE 40 MG: 40 INJECTION, SUSPENSION INTRA-ARTICULAR; INTRALESIONAL; INTRAMUSCULAR; SOFT TISSUE at 03:05

## 2022-05-24 NOTE — PROCEDURES
Large Joint Aspiration/Injection: L greater trochanteric bursa    Date/Time: 5/24/2022 3:15 PM  Performed by: Lita Billings MD  Authorized by: Lita Billings MD     Consent Done?:  Yes (Written)  Indications:  Pain  Timeout: prior to procedure the correct patient, procedure, and site was verified    Prep: patient was prepped and draped in usual sterile fashion      Local anesthesia used?: Yes    Anesthesia method: cold spray used for topicalization.  Local anesthetic:  Bupivacaine 0.25% without epinephrine  Anesthetic total (ml):  4      Details:  Needle Size:  25 G  Ultrasonic Guidance for needle placement?: No    Approach:  Lateral  Location:  Hip  Site:  L greater trochanteric bursa  Medications:  40 mg methylPREDNISolone acetate 40 mg/mL  Patient tolerance:  Patient tolerated the procedure well with no immediate complications

## 2022-05-24 NOTE — PROGRESS NOTES
FOLLOW UP NOTE:     CHIEF COMPLAINT: back and hip pain    INITIAL HISTORY OF PRESENT ILLNESS: Gregorio Guerra is a 66 y.o. male with PMH significant for hx of right hip arthroplasty, atrial fibrillation on Eliquis, hx of right arthroscopic knee surgery, and HTN presents for the evaluation of back pain. The patient reports that his pain began approximately 30 years ago after no inciting incident or trauma. The patient reports of worsening pain since onset. The patient localizes his pain to area across his lower back. The patient denies of radiation of his back pain. The patient describes his pain as a constant type of pain. The patient reports of numbness in the bottom of his left foot. The patient reports that his pain is a 4/10. The patient reports that his pain can increase to a 10/10 at its worst. Patient denies of any urinary/fecal incontinence, saddle anesthesia, or weakness.      Aggravating factors: bending, lifting items      Mitigating factors: rest    INTERVAL HISTORY OF PRESENT ILLNESS: Gregorio Guerra is a 67 y.o. male with PMH significant for hx of right hip arthroplasty, atrial fibrillation on Eliquis, hx of right arthroscopic knee surgery, and HTN presents as an established patient for the continued management of back and hip pain. The patient is s/p lumbar interlaminar epidural steroid injection at L3-L4 on 4/4/2022, and he reports of minimal relief. Today, the patient localizes his worst pain to the lateral aspect of his left hip. He continues to report of persistent pain in his lower back with stabbing pain in his legs and feet (equally). He also reports of numbness in both feet. The patient reports that his pain is worsened with physical activity in general and bending. The patient reports that his pain is improved with sitting and rest. The patient denies of any significant changes in his health since his last appointment. The patient also denies of any changes in the character of his  "pain since his last appointment. The patient reports that his current pain is a 7/10. Patient denies of any urinary/fecal incontinence, saddle anesthesia, or weakness.     INTERVENTIONAL PAIN HISTORY:  4/4/2022: Lumbar interlaminar epidural steroid injection at L3-L4 - minimal relief    CURRENT PAIN MEDICATIONS:   OTC Tylenol  daily marijuana use         ROS:  Review of Systems   Constitutional: Negative for chills and fever.   HENT: Negative for tinnitus.    Eyes: Negative for visual disturbance.   Respiratory: Negative for shortness of breath.    Cardiovascular: Negative for chest pain.   Gastrointestinal: Negative for nausea and vomiting.   Genitourinary: Negative for difficulty urinating.   Musculoskeletal: Positive for arthralgias and back pain.   Skin: Negative for rash.   Allergic/Immunologic: Negative for immunocompromised state.   Neurological: Positive for numbness. Negative for syncope.   Hematological: Does not bruise/bleed easily.   Psychiatric/Behavioral: Negative for suicidal ideas.        MEDICAL, SURGICAL, FAMILY, SOCIAL HX: reviewed    MEDICATIONS/ALLERGIES: reviewed    PHYSICAL EXAM:    VITALS: Vitals reviewed.   Vitals:    05/24/22 1511   BP: (!) 142/76   Pulse: 87   Weight: 116.6 kg (257 lb)   Height: 6' 1" (1.854 m)   PainSc:   6   PainLoc: Hip       Physical Exam  Vitals and nursing note reviewed.   Constitutional:       Appearance: He is not diaphoretic.   HENT:      Head: Normocephalic and atraumatic.   Eyes:      General:         Right eye: No discharge.         Left eye: No discharge.      Conjunctiva/sclera: Conjunctivae normal.   Cardiovascular:      Rate and Rhythm: Normal rate.   Pulmonary:      Effort: Pulmonary effort is normal. No respiratory distress.      Breath sounds: Normal breath sounds.   Abdominal:      Palpations: Abdomen is soft.   Skin:     General: Skin is warm and dry.      Findings: No rash.   Neurological:      Mental Status: He is alert and oriented to person, place, " and time.   Psychiatric:         Mood and Affect: Mood and affect normal.         Cognition and Memory: Memory normal.         Judgment: Judgment normal.        UPPER EXTREMITIES: Normal alignment, normal range of motion, no atrophy, no skin changes,  hair growth and nail growth normal and equal bilaterally. No swelling, no tenderness.    LOWER EXTREMITIES:  Normal alignment, normal range of motion, no atrophy, no skin changes,  hair growth and nail growth normal and equal bilaterally. No swelling, no tenderness.     LUMBAR SPINE  Lumbar spine: ROM is full with flexion extension and oblique extension with increased pain with extension     ((--)) Supine straight leg raise    ((+)) Facet loading   Internal and external rotation of the hip causes no increased pain on either side. TTP overlying the left greater trochanter.   Myofascial exam: Tenderness to palpation across lumbar paraspinous muscles.    MOTOR: Tone and bulk: normal      MUSCLE STRENGTH:  Hip Flexion: Right 5/5, Left 5/5  Hip Extension: Right 5/5, Left 5/5  Leg Flexion: Right 5/5, Left 5/5  Leg Extension: Right 5/5, Left 5/5  Plantar Flexion: Right 5/5, Left 5/5  Dorsiflexion: Right 5/5, Left 5/5     SENSATION: Light touch and pinprick intact bilaterally  REFLEXES: normal, symmetric, nonbrisk.  Toes down, no clonus. Negative mcduffie's sign bilaterally.  GAIT: antalgic gait    IMAGING: no new imaging to review    ASSESSMENT: Gregorio Guerra is a 67 y.o. male with PMH significant for hx of right hip arthroplasty, atrial fibrillation on Eliquis, hx of right arthroscopic knee surgery, and HTN presents as an established patient for the continued management of back and hip pain. The patient is s/p lumbar interlaminar epidural steroid injection at L3-L4 on 4/4/2022, and he reports of minimal relief. Today, the patient localizes his worst pain to the lateral aspect of his left hip and continues to report of persistent pain in his lower back with stabbing pain  in his legs and feet (equally). I suspect the severe spinal canal stenosis seen from CT scan in 2019 has progressed in severity. Treatment plan outlined below.     PLAN:  1. Ordered MRI of the lumbar spine without contrast ordered for further evaluation   2. Prescribed short course of Norco 7.5-325 mg PO q 6 hr PRN for breakthrough pain; # 30 tablets; 0 refills.  reviewed without discrepancies.   3. I have stressed the importance of physical activity and a home exercise plan to help with chronic pain and improve health.  4. Performed left greater trochanteric bursa injection in clinic today per patient request  5. RTC s/p imaging to discuss results and interventions as appropriate.    Lita Billings MD  Pain Management

## 2022-06-20 DIAGNOSIS — M51.36 DDD (DEGENERATIVE DISC DISEASE), LUMBAR: ICD-10-CM

## 2022-06-20 DIAGNOSIS — M47.896 OTHER SPONDYLOSIS, LUMBAR REGION: ICD-10-CM

## 2022-06-20 DIAGNOSIS — M54.16 LUMBAR RADICULOPATHY, CHRONIC: ICD-10-CM

## 2022-06-20 DIAGNOSIS — M48.00 CENTRAL STENOSIS OF SPINAL CANAL: ICD-10-CM

## 2022-06-20 RX ORDER — HYDROCODONE BITARTRATE AND ACETAMINOPHEN 7.5; 325 MG/1; MG/1
1 TABLET ORAL EVERY 6 HOURS PRN
Qty: 30 TABLET | Refills: 0 | Status: SHIPPED | OUTPATIENT
Start: 2022-06-20 | End: 2022-07-23 | Stop reason: SDUPTHER

## 2022-06-20 NOTE — TELEPHONE ENCOUNTER
----- Message from Trisha Yi sent at 6/20/2022  3:56 PM CDT -----  Regarding: Refill  Type: RX Refill Request    Who Called:DANNY BRYANT [[id number]]    RX Name and Strength:HYDROcodone-acetaminophen (NORCO) 7.5-325 mg per tablet    Preferred Pharmacy with phone number:[facility] [address], [phone]    Would the patient rather a call back or a response via [facility]?call back     Best Call Back Number:[phone]    Additional Information:

## 2022-07-25 RX ORDER — SPIRONOLACTONE 25 MG/1
25 TABLET ORAL DAILY
Qty: 90 TABLET | Refills: 0 | OUTPATIENT
Start: 2022-07-25

## 2022-10-14 NOTE — PROGRESS NOTES
Subjective:       Chief Complaint    Chief Complaint   Patient presents with    Post-op Evaluation     3 weeks & 1 day.  DOS: 10/29/2018, right anterior LILIBETH.  Only issues to reports are some soreness & tighness.  Using cane.  Buddy were removed by Ashly Naqvi P.T. 3 times a week.         THOMAS Andrea is a 63 y.o.  male who presents follow-up right hip arthroplasty anterior approach. Patient doing well.      Past History  Past Medical History:   Diagnosis Date    A-fib     Hypertension      Past Surgical History:   Procedure Laterality Date    CARDIAC SURGERY      Correct abnormal heart rhythm    KNEE SURGERY      TOTAL HIP ARTHROPLASTY Right 10/29/2018    anterior approach (depuy hardware)     Social History     Socioeconomic History    Marital status:      Spouse name: Not on file    Number of children: Not on file    Years of education: Not on file    Highest education level: Not on file   Social Needs    Financial resource strain: Not on file    Food insecurity - worry: Not on file    Food insecurity - inability: Not on file    Transportation needs - medical: Not on file    Transportation needs - non-medical: Not on file   Occupational History    Not on file   Tobacco Use    Smoking status: Current Every Day Smoker    Smokeless tobacco: Never Used   Substance and Sexual Activity    Alcohol use: Not on file    Drug use: Not on file    Sexual activity: Not on file   Other Topics Concern    Not on file   Social History Narrative    Not on file         Medications  Current Outpatient Medications   Medication Sig    apixaban (ELIQUIS) 5 mg (74 tabs) DsPk Take by mouth. For the first 7 days take two 5 mg tablets twice daily.  After 7 days take one 5 mg tablet twice daily.    furosemide (LASIX) 20 MG tablet Take 20 mg by mouth 2 (two) times daily as needed.     losartan potassium (LOSARTAN ORAL) Take by mouth.    metoprolol succinate (TOPROL-XL) 25 MG 24 hr tablet Take 25  mg by mouth 2 (two) times daily.     oxyCODONE-acetaminophen (PERCOCET) 7.5-325 mg per tablet Take 1 tablet by mouth every 6 (six) hours as needed for Pain.    SPIRONOLACTONE ORAL Take 37.5 mg by mouth once daily.     No current facility-administered medications for this visit.        Allergies  Review of patient's allergies indicates:   Allergen Reactions    Lisinopril          Review of Systems     Constitutional: Negative    HENT: Negative  Eyes: Negative  Respiratory: Negative  Cardiovascular: Negative  Musculoskeletal: HPI  Skin: Negative  Neurological: Negative  Hematological: Negative  Endocrine: Negative      Physical Exam    There were no vitals filed for this visit.  Physical Examination: Right hip shows a healed incision there is a little bit indurated but nontender. Abduction 40° flexion 90° external rotation 35° internal rotation 30°. Relating with his cane. Leg lengths look good     Skin-clear   General appearance -  well appearing, and in no distress  Mental status - awake  Neck - supple  Chest -  symmetric air entry  Heart - normal rate   Abdomen - soft      Assessment/Plan   Primary osteoarthritis of right hip    Postoperative state    History of total right hip replacement    Encouraged to get into an indoor pool for exercise. He's been advised the hip will continue to improve for a year.        This note was dictated using voice recognition software and may contain grammatical errors.   No

## 2022-12-17 ENCOUNTER — PATIENT MESSAGE (OUTPATIENT)
Dept: PAIN MEDICINE | Facility: CLINIC | Age: 67
End: 2022-12-17
Payer: MEDICARE

## 2022-12-25 ENCOUNTER — PATIENT MESSAGE (OUTPATIENT)
Dept: PAIN MEDICINE | Facility: CLINIC | Age: 67
End: 2022-12-25
Payer: MEDICARE

## 2023-01-04 ENCOUNTER — HOSPITAL ENCOUNTER (OUTPATIENT)
Dept: RADIOLOGY | Facility: HOSPITAL | Age: 68
Discharge: HOME | End: 2023-01-04
Attending: ANESTHESIOLOGY
Payer: MEDICARE

## 2023-01-04 PROCEDURE — 72148 MRI LUMBAR SPINE W/O DYE: CPT | Mod: 26,,, | Performed by: RADIOLOGY

## 2023-01-04 PROCEDURE — 72148 MRI LUMBAR SPINE WITHOUT CONTRAST: ICD-10-PCS | Mod: 26,,, | Performed by: RADIOLOGY

## 2023-01-04 PROCEDURE — 72148 MRI LUMBAR SPINE W/O DYE: CPT | Mod: TC

## 2023-01-09 ENCOUNTER — TELEPHONE (OUTPATIENT)
Dept: PAIN MEDICINE | Facility: CLINIC | Age: 68
End: 2023-01-09
Payer: MEDICARE

## 2023-01-09 ENCOUNTER — PATIENT MESSAGE (OUTPATIENT)
Dept: CARDIOLOGY | Facility: CLINIC | Age: 68
End: 2023-01-09
Payer: MEDICARE

## 2023-01-09 NOTE — TELEPHONE ENCOUNTER
Can you look at this pt's MRI and advise? They are calling for results and Dr. Billings was the ordering provider.

## 2023-01-09 NOTE — TELEPHONE ENCOUNTER
----- Message from Starr Sexton LPN sent at 1/9/2023  3:28 PM CST -----  Contact: pt    ----- Message -----  From: Jessi Bishop  Sent: 1/9/2023   2:56 PM CST  To: Awa London Staff    Type:  Test Results    Who Called:  pt  Name of Test (Lab/Mammo/Etc):  MRI/Results  Date of Test:  1/4  Ordering Provider:  Dr. Billings   Where the test was performed:  [facility]  Best Call Back Number:  [phone]    Additional Information:  Please give pt a call back       Wife is asking it to sent to email also

## 2023-01-09 NOTE — TELEPHONE ENCOUNTER
----- Message from Jesis Bishop sent at 1/9/2023  2:34 PM CST -----  Contact: pt  Type:  Test Results    Who Called:  pt  Name of Test (Lab/Mammo/Etc):  MRI/Results  Date of Test:  1/4  Ordering Provider:  Dr. Billings   Where the test was performed:  [facility]  Best Call Back Number:  [phone]    Additional Information:  Please give pt a call back       Wife is asking it to sent to email also

## 2023-01-10 DIAGNOSIS — M51.36 DDD (DEGENERATIVE DISC DISEASE), LUMBAR: ICD-10-CM

## 2023-01-10 DIAGNOSIS — M89.8X8 MASS OF SPINE: ICD-10-CM

## 2023-01-10 DIAGNOSIS — R22.9 LOCALIZED SWELLING, MASS AND LUMP, UNSPECIFIED: ICD-10-CM

## 2023-01-10 DIAGNOSIS — M54.16 LUMBAR RADICULOPATHY, CHRONIC: ICD-10-CM

## 2023-01-10 DIAGNOSIS — M48.00 CENTRAL STENOSIS OF SPINAL CANAL: Primary | ICD-10-CM

## 2023-01-11 ENCOUNTER — TELEPHONE (OUTPATIENT)
Dept: PAIN MEDICINE | Facility: CLINIC | Age: 68
End: 2023-01-11
Payer: MEDICARE

## 2023-01-11 DIAGNOSIS — M54.16 LUMBAR RADICULOPATHY, CHRONIC: ICD-10-CM

## 2023-01-11 DIAGNOSIS — M47.896 OTHER SPONDYLOSIS, LUMBAR REGION: ICD-10-CM

## 2023-01-11 DIAGNOSIS — M48.00 CENTRAL STENOSIS OF SPINAL CANAL: Primary | ICD-10-CM

## 2023-01-11 NOTE — TELEPHONE ENCOUNTER
----- Message from Chaya Ratliff NP sent at 1/11/2023 10:06 AM CST -----  Regarding: referral  Please inquire the NSGY that the patient is requesting.   Thank you,   Chaya Ratliff NP   ----- Message -----  From: Jessi Patricio RN  Sent: 1/11/2023   9:54 AM CST  To: Gaudencio Larkin Staff    Pt denied referral to Dr. Delgadillo and requests a referral be sent to a Neurosurgeon in [place].

## 2023-01-17 ENCOUNTER — TELEPHONE (OUTPATIENT)
Dept: PAIN MEDICINE | Facility: CLINIC | Age: 68
End: 2023-01-17
Payer: MEDICARE

## 2023-01-17 NOTE — TELEPHONE ENCOUNTER
----- Message from Socorro De La Vega sent at 1/17/2023  9:29 AM CST -----  Type: Patient Call Back         Who called: Pt          What is the request in detail: Pt called in regarding requesting MRI L SPINE NON CONT that was done on 1/4/23 to be put on a scan disc and would like the department to notify when ready for  .         Can the clinic reply by [facility]? No          Would the patient rather a call back or a response via [facility]?  Call back          Best call back number:[phone] (mobile)          Additional Information: Pt is requesting to  for 12pm if possible ?           Thank You

## 2023-01-19 ENCOUNTER — TELEPHONE (OUTPATIENT)
Dept: CARDIOLOGY | Facility: CLINIC | Age: 68
End: 2023-01-19
Payer: MEDICARE

## 2023-01-19 NOTE — TELEPHONE ENCOUNTER
----- Message from Bartolo Solomon sent at 1/19/2023  8:36 AM CST -----  Contact: Gabbie  Type: Needs Medical Advice  Who Called: Gabbie with [facility]  Symptoms (please be specific):   How long has patient had these symptoms:    Pharmacy name and phone #:    Best Call Back Number: [phone], fax# [phone]  Additional Information: Gabbie requesting a call back concerning a medical clearance for the pt.Clearance was sent on 01/17/23 and requesting to hear back concerning the clearance.

## 2023-01-19 NOTE — LETTER
2023    Gregorio Guerra  [address]  [place] [address]             [facility]  [address]  [place] [address]  Phone: [phone]  Fax: [phone] Patient: Gregorio Guerra  : 1955  Referring Doctor:Leonel Velasco MD  Telephone:[phone]  Date of Last Office Visit:2021  Procedure: Lumbar epidural injection  Consulting Provider: Wilfredo Billings MD    Current Outpatient Medications   Medication Sig    albuterol (VENTOLIN HFA) 90 mcg/actuation inhaler Inhale 2 puffs into the lungs every 6 (six) hours as needed for Wheezing. Rescue    colchicine (COLCRYS) 0.6 mg tablet Take 1 tablet (0.6 mg total) by mouth once daily. for 14 days    ELIQUIS 5 mg Tab Take 1 tablet (5 mg total) by mouth 2 (two) times daily.    furosemide (LASIX) 20 MG tablet Take 1 tablet (20 mg total) by mouth once daily.    HYDROcodone-acetaminophen (NORCO) 7.5-325 mg per tablet Take 1 tablet by mouth every 6 (six) hours as needed for Pain.    ipratropium (ATROVENT) 0.02 % nebulizer solution Take 2.5 mLs (500 mcg total) by nebulization every 6 (six) hours as needed for Wheezing. Rescue    losartan (COZAAR) 25 MG tablet Take 1 tablet (25 mg total) by mouth once daily.    metoprolol succinate (TOPROL-XL) 50 MG 24 hr tablet Take 1 tablet (50 mg total) by mouth once daily.    metoprolol succinate (TOPROL-XL) 50 MG 24 hr tablet Take 1 tablet (50 mg total) by mouth once daily.    oxyCODONE-acetaminophen (PERCOCET) 5-325 mg per tablet Take 1 tablet by mouth every 12 (twelve) hours as needed for Pain.    sildenafiL (VIAGRA) 50 MG tablet TAKE 1 TABLET BY MOUTH AS NEEDED FOR ERECTILE DYSFUNCTION (Patient taking differently: Take 50 mg by mouth daily as needed.)    spironolactone (ALDACTONE) 25 MG tablet Take 1 tablet (25 mg total) by mouth once daily.     Current Facility-Administered Medications   Medication    ipratropium 0.02 %  nebulizer solution 0.5 mg       This patient has been assessed for risk factors for clearance of surgery with the following stipulations:Moderate Risk     ___ No contraindications  _x__ Recommendations for antiplatelet/anticoagulant medications:Hold Eliquis for x 3 days prior to procedure.  _x__ Cleared for surgery with the following contraindications/precautions: moderate risks.   ___ Not cleared for surgery due to the following reasons:      If you have any questions regarding the above, please contact my office at [phone]    Sincerely,      Rocio Mak NP-C  Department of Cardiology   [facility]

## 2023-01-20 ENCOUNTER — TELEPHONE (OUTPATIENT)
Dept: CARDIOLOGY | Facility: CLINIC | Age: 68
End: 2023-01-20

## 2023-01-20 ENCOUNTER — CLINICAL SUPPORT (OUTPATIENT)
Dept: CARDIOLOGY | Facility: CLINIC | Age: 68
End: 2023-01-20
Payer: MEDICARE

## 2023-01-20 DIAGNOSIS — I50.32 CHRONIC HEART FAILURE WITH PRESERVED EJECTION FRACTION: Primary | ICD-10-CM

## 2023-01-20 DIAGNOSIS — I25.10 CORONARY ARTERY DISEASE INVOLVING NATIVE CORONARY ARTERY OF NATIVE HEART WITHOUT ANGINA PECTORIS: ICD-10-CM

## 2023-01-20 PROCEDURE — 99999 PR PBB SHADOW E&M-EST. PATIENT-LVL III: CPT | Mod: PBBFAC,,,

## 2023-01-20 PROCEDURE — 93000 ELECTROCARDIOGRAM COMPLETE: CPT | Mod: S$GLB,,, | Performed by: INTERNAL MEDICINE

## 2023-01-20 PROCEDURE — 93000 EKG 12-LEAD: ICD-10-PCS | Mod: S$GLB,,, | Performed by: INTERNAL MEDICINE

## 2023-01-20 PROCEDURE — 99999 PR PBB SHADOW E&M-EST. PATIENT-LVL III: ICD-10-PCS | Mod: PBBFAC,,,

## 2023-01-20 NOTE — TELEPHONE ENCOUNTER
----- Message from Carmen Rolle sent at 1/20/2023  9:29 AM CST -----  Contact: [phone]  Type: Needs Medical Advice  Who Called:  Gabbie with [facility]     Best Call Back Number: [phone] / Fax [phone]  or [phone] (Back Fax)   Additional Information: Pls resend clearance form for pt that was faxed yesterday. They did not receive it.

## 2023-02-02 ENCOUNTER — HOSPITAL ENCOUNTER (OUTPATIENT)
Facility: HOSPITAL | Age: 68
Discharge: HOME | End: 2023-02-02
Attending: ANESTHESIOLOGY | Admitting: ANESTHESIOLOGY
Payer: MEDICARE

## 2023-02-02 VITALS
RESPIRATION RATE: 20 BRPM | HEART RATE: 64 BPM | DIASTOLIC BLOOD PRESSURE: 93 MMHG | OXYGEN SATURATION: 96 % | SYSTOLIC BLOOD PRESSURE: 161 MMHG | BODY MASS INDEX: 33.91 KG/M2 | TEMPERATURE: 98 F | HEIGHT: 73 IN

## 2023-02-02 DIAGNOSIS — M48.07 SPINAL STENOSIS, LUMBOSACRAL REGION: ICD-10-CM

## 2023-02-02 DIAGNOSIS — M48.062 SPINAL STENOSIS OF LUMBAR REGION WITH NEUROGENIC CLAUDICATION: Primary | ICD-10-CM

## 2023-02-02 PROCEDURE — 64483 NJX AA&/STRD TFRM EPI L/S 1: CPT | Mod: 50 | Performed by: ANESTHESIOLOGY

## 2023-02-02 PROCEDURE — 25500020 PHARM REV CODE 255: Performed by: ANESTHESIOLOGY

## 2023-02-02 PROCEDURE — 63600175 PHARM REV CODE 636 W HCPCS: Performed by: ANESTHESIOLOGY

## 2023-02-02 PROCEDURE — 99152 MOD SED SAME PHYS/QHP 5/>YRS: CPT | Performed by: ANESTHESIOLOGY

## 2023-02-02 PROCEDURE — 25000003 PHARM REV CODE 250: Performed by: ANESTHESIOLOGY

## 2023-02-02 RX ORDER — KETOROLAC TROMETHAMINE 30 MG/ML
30 INJECTION, SOLUTION INTRAMUSCULAR; INTRAVENOUS ONCE
Status: COMPLETED | OUTPATIENT
Start: 2023-02-02 | End: 2023-02-02

## 2023-02-02 RX ORDER — BUPIVACAINE HYDROCHLORIDE 2.5 MG/ML
INJECTION, SOLUTION EPIDURAL; INFILTRATION; INTRACAUDAL
Status: DISCONTINUED | OUTPATIENT
Start: 2023-02-02 | End: 2023-02-02 | Stop reason: HOSPADM

## 2023-02-02 RX ORDER — DEXAMETHASONE SODIUM PHOSPHATE 4 MG/ML
INJECTION, SOLUTION INTRA-ARTICULAR; INTRALESIONAL; INTRAMUSCULAR; INTRAVENOUS; SOFT TISSUE
Status: DISCONTINUED | OUTPATIENT
Start: 2023-02-02 | End: 2023-02-02 | Stop reason: HOSPADM

## 2023-02-02 RX ORDER — LIDOCAINE HYDROCHLORIDE 10 MG/ML
INJECTION INFILTRATION; PERINEURAL
Status: DISCONTINUED | OUTPATIENT
Start: 2023-02-02 | End: 2023-02-02 | Stop reason: HOSPADM

## 2023-02-02 RX ORDER — SODIUM CHLORIDE, SODIUM LACTATE, POTASSIUM CHLORIDE, CALCIUM CHLORIDE 600; 310; 30; 20 MG/100ML; MG/100ML; MG/100ML; MG/100ML
INJECTION, SOLUTION INTRAVENOUS ONCE AS NEEDED
Status: DISCONTINUED | OUTPATIENT
Start: 2023-02-02 | End: 2023-02-02 | Stop reason: HOSPADM

## 2023-02-02 RX ORDER — MIDAZOLAM HYDROCHLORIDE 1 MG/ML
INJECTION, SOLUTION INTRAMUSCULAR; INTRAVENOUS
Status: DISCONTINUED | OUTPATIENT
Start: 2023-02-02 | End: 2023-02-02 | Stop reason: HOSPADM

## 2023-02-02 RX ORDER — FENTANYL CITRATE 50 UG/ML
INJECTION, SOLUTION INTRAMUSCULAR; INTRAVENOUS
Status: DISCONTINUED | OUTPATIENT
Start: 2023-02-02 | End: 2023-02-02 | Stop reason: HOSPADM

## 2023-02-02 RX ADMIN — KETOROLAC TROMETHAMINE 30 MG: 30 INJECTION, SOLUTION INTRAMUSCULAR; INTRAVENOUS at 09:02

## 2023-02-02 NOTE — PLAN OF CARE
"Has met unit/department guidelines for discharge from each phase of the post procedure continuum. Leaving floor per w/c with RN. RICARDO x3. Resp even and unlabored room air. No distress noted. Tolerating Po fluids without c/o nausea/vomiting. Reports "numbness to legs". Dr. Billings aware and reassured pt this is normal due to the medications used for injection. All personal belongings returned to pt.   " negative...

## 2023-02-02 NOTE — DISCHARGE INSTRUCTIONS
[phone]  [facility]   [phone]  [facility]      [phone]    Managing nausea    Some people have an upset stomach after surgery. This is often because of anesthesia, pain, or pain medicine, or the stress of surgery. These tips will help you handle nausea and eat healthy foods as you get better. If you were on a special food plan before surgery, ask your healthcare provider if you should follow it while you get better. These tips may help:  Do not push yourself to eat. Your body will tell you when to eat and how much.  Start off with clear liquids and soup. They are easier to digest.  Next try semi-solid foods, such as mashed potatoes, applesauce, and gelatin, as you feel ready.  Slowly move to solid foods. Dont eat fatty, rich, or spicy foods at first.  Do not force yourself to have 3 large meals a day. Instead eat smaller amounts more often.  Take pain medicines with a small amount of solid food, such as crackers or toast, to avoid nausea.

## 2023-02-02 NOTE — H&P
"FOLLOW UP NOTE:     CHIEF COMPLAINT: back pain    INTERVAL HISTORY OF PRESENT ILLNESS: Gregorio Guerra is a 67 y.o. male who presents for bilateral L2-L3 lumbar transforaminal epidural steroid injection. The patient denies of any significant changes in his health since his last appointment. The patient also denies of any changes in the character of his pain since his last appointment.     ROS:  Review of Systems   Constitutional:  Negative for chills and fever.   HENT:  Negative for tinnitus.    Eyes:  Negative for visual disturbance.   Respiratory:  Negative for shortness of breath.    Cardiovascular:  Negative for chest pain.   Gastrointestinal:  Negative for nausea and vomiting.   Genitourinary:  Negative for difficulty urinating.   Musculoskeletal:  Positive for back pain.   Skin:  Negative for rash.   Allergic/Immunologic: Negative for immunocompromised state.   Neurological:  Negative for syncope.   Hematological:  Does not bruise/bleed easily.   Psychiatric/Behavioral:  Negative for suicidal ideas.       MEDICAL, SURGICAL, FAMILY, SOCIAL HX: reviewed    MEDICATIONS/ALLERGIES: reviewed    PHYSICAL EXAM:    VITALS: Vitals reviewed.   Vitals:    02/02/23 0809 02/02/23 0812   BP: (!) 154/82    Pulse: 67    Resp: 19    Temp:  97.5 °F (36.4 °C)   SpO2: 95%    Height: 6' 1" (1.854 m)        Physical Exam  Vitals and nursing note reviewed.   Constitutional:       Appearance: Normal appearance. He is not toxic-appearing or diaphoretic.   HENT:      Head: Normocephalic and atraumatic.   Eyes:      General:         Right eye: No discharge.         Left eye: No discharge.      Extraocular Movements: Extraocular movements intact.      Conjunctiva/sclera: Conjunctivae normal.   Cardiovascular:      Rate and Rhythm: Normal rate.   Pulmonary:      Effort: Pulmonary effort is normal. No respiratory distress.      Breath sounds: Normal breath sounds.   Abdominal:      Palpations: Abdomen is soft.   Skin:     General: Skin " is warm and dry.      Findings: No rash.   Neurological:      Mental Status: He is alert and oriented to person, place, and time.   Psychiatric:         Mood and Affect: Mood and affect normal.         Cognition and Memory: Memory normal.         Judgment: Judgment normal.        EXTREMITIES:    Gen: No cyanosis, edema, varicosities, or tenderness to palpation BLE   Skin: Warm, pink, dry, no rashes, no lesions BLE   Strength: 5/5 motor strength BLE   ROM: hips, knees and ankles without pain or instability.     NEUROLOGICAL:    Gen: No clonus or spasticity.   Gait: Normal without antalgic lean   DTR's: 2+ in bilateral patellar, and ankle   BABINSKI: Absent bilaterally  Sensory: Intact to light touch and proprioception BLE    ASSESSMENT: Gregorio Guerra is a 67 y.o. male who presents for bilateral L2-L3 lumbar transforaminal epidural steroid injection.     PLAN:  Proceed with bilateral L2-L3 lumbar transforaminal epidural steroid injection as previously discussed.    This patient has been cleared for surgery in an ambulatory surgical facility    ASA 3,  Mallampatti Score 3  No history of anesthetic complications  Plan for RN IV sedation    Lita Billings MD  Pain Management

## 2023-02-02 NOTE — OP NOTE
PROCEDURE DATE: 2/2/2023    PROCEDURE: Bilateral L2-L3 transforaminal epidural steroid injection under fluoroscopy    DIAGNOSIS: M51.16 Intervertebral disc disorders with radiculopathy, lumbar region  Post op diagnosis: Same    PHYSICIAN: Lita Billings MD    MEDICATIONS INJECTED:  Dexamethasone 5mg (0.5ml) and 1.5ml 0.25% bupivicaine at each nerve root.     LOCAL ANESTHETIC INJECTED:  Lidocaine 1%. 3 ml per site.    SEDATION MEDICATIONS: RN IV sedation    ESTIMATED BLOOD LOSS:  none    COMPLICATIONS:  none    TECHNIQUE:   A time-out was taken to identify patient and procedure side prior to starting the procedure. The patient was placed in a prone position, prepped and draped in the usual sterile fashion using ChloraPrep and sterile towels.  The area to be injected was determined under fluoroscopic guidance in AP and oblique view.  Local anesthetic was given by raising a wheal and going down to the hub of a 25-gauge 1.5 inch needle.  In oblique view, a 5 inch 22-gauge bent-tip spinal needle was introduced towards 6 oclock position of the pedicle of each above named nerve root level.  The needle was walked medially then hinged into the neural foramen and position was confirmed in AP and lateral views.  2 mL contrast dye was injected to confirm appropriate placement and that there was no vascular uptake.  After negative aspiration for blood or CSF, the medication was then injected. This was performed at the L2-L3 level(s). The patient tolerated the procedure well.    The patient was monitored after the procedure.  Patient was given post procedure and discharge instructions to follow at home. The patient was discharged in a stable condition.

## 2023-02-02 NOTE — DISCHARGE SUMMARY
[facility] Surgery  Discharge Note  Short Stay    Procedure(s) (LRB):  INJECTION, STEROID, EPIDURAL (Bilateral)      OUTCOME:   Patient tolerated treatment/procedure well without complication and is now ready for discharge.    DISPOSITION: Home or Self Care    FINAL DIAGNOSIS:  Lumbar radiculopathy    FOLLOWUP: In clinic    DISCHARGE INSTRUCTIONS:    Discharge Procedure Orders   Diet general     Call MD for:  temperature >100.4     Call MD for:  persistent nausea and vomiting     Call MD for:  severe uncontrolled pain     Call MD for:  difficulty breathing, headache or visual disturbances     Call MD for:  redness, tenderness, or signs of infection (pain, swelling, redness, odor or green/yellow discharge around incision site)     Call MD for:  hives     Call MD for:  persistent dizziness or light-headedness     Call MD for:  extreme fatigue        TIME SPENT ON DISCHARGE: 30 minutes

## 2023-02-03 ENCOUNTER — OFFICE VISIT (OUTPATIENT)
Dept: CARDIOLOGY | Facility: CLINIC | Age: 68
End: 2023-02-03
Payer: MEDICARE

## 2023-02-03 VITALS
WEIGHT: 263 LBS | HEART RATE: 72 BPM | RESPIRATION RATE: 16 BRPM | DIASTOLIC BLOOD PRESSURE: 90 MMHG | SYSTOLIC BLOOD PRESSURE: 140 MMHG | BODY MASS INDEX: 34.85 KG/M2 | OXYGEN SATURATION: 98 % | HEIGHT: 73 IN

## 2023-02-03 DIAGNOSIS — I25.10 CAD IN NATIVE ARTERY: ICD-10-CM

## 2023-02-03 DIAGNOSIS — F17.200 CURRENT EVERY DAY SMOKER: ICD-10-CM

## 2023-02-03 DIAGNOSIS — I48.0 PAROXYSMAL ATRIAL FIBRILLATION: ICD-10-CM

## 2023-02-03 DIAGNOSIS — E78.2 MIXED HYPERLIPIDEMIA: ICD-10-CM

## 2023-02-03 DIAGNOSIS — I50.32 CHRONIC HEART FAILURE WITH PRESERVED EJECTION FRACTION: Primary | ICD-10-CM

## 2023-02-03 DIAGNOSIS — E66.01 MORBID OBESITY: ICD-10-CM

## 2023-02-03 DIAGNOSIS — I10 ESSENTIAL HYPERTENSION: ICD-10-CM

## 2023-02-03 PROCEDURE — 3080F DIAST BP >= 90 MM HG: CPT | Mod: CPTII,S$GLB,, | Performed by: INTERNAL MEDICINE

## 2023-02-03 PROCEDURE — 1125F PR PAIN SEVERITY QUANTIFIED, PAIN PRESENT: ICD-10-PCS | Mod: CPTII,S$GLB,, | Performed by: INTERNAL MEDICINE

## 2023-02-03 PROCEDURE — 99215 OFFICE O/P EST HI 40 MIN: CPT | Mod: S$GLB,,, | Performed by: INTERNAL MEDICINE

## 2023-02-03 PROCEDURE — 3077F SYST BP >= 140 MM HG: CPT | Mod: CPTII,S$GLB,, | Performed by: INTERNAL MEDICINE

## 2023-02-03 PROCEDURE — 1160F RVW MEDS BY RX/DR IN RCRD: CPT | Mod: CPTII,S$GLB,, | Performed by: INTERNAL MEDICINE

## 2023-02-03 PROCEDURE — 3077F PR MOST RECENT SYSTOLIC BLOOD PRESSURE >= 140 MM HG: ICD-10-PCS | Mod: CPTII,S$GLB,, | Performed by: INTERNAL MEDICINE

## 2023-02-03 PROCEDURE — 3288F PR FALLS RISK ASSESSMENT DOCUMENTED: ICD-10-PCS | Mod: CPTII,S$GLB,, | Performed by: INTERNAL MEDICINE

## 2023-02-03 PROCEDURE — 1160F PR REVIEW ALL MEDS BY PRESCRIBER/CLIN PHARMACIST DOCUMENTED: ICD-10-PCS | Mod: CPTII,S$GLB,, | Performed by: INTERNAL MEDICINE

## 2023-02-03 PROCEDURE — 1101F PR PT FALLS ASSESS DOC 0-1 FALLS W/OUT INJ PAST YR: ICD-10-PCS | Mod: CPTII,S$GLB,, | Performed by: INTERNAL MEDICINE

## 2023-02-03 PROCEDURE — 1101F PT FALLS ASSESS-DOCD LE1/YR: CPT | Mod: CPTII,S$GLB,, | Performed by: INTERNAL MEDICINE

## 2023-02-03 PROCEDURE — 3008F BODY MASS INDEX DOCD: CPT | Mod: CPTII,S$GLB,, | Performed by: INTERNAL MEDICINE

## 2023-02-03 PROCEDURE — 1159F PR MEDICATION LIST DOCUMENTED IN MEDICAL RECORD: ICD-10-PCS | Mod: CPTII,S$GLB,, | Performed by: INTERNAL MEDICINE

## 2023-02-03 PROCEDURE — 4010F ACE/ARB THERAPY RXD/TAKEN: CPT | Mod: CPTII,S$GLB,, | Performed by: INTERNAL MEDICINE

## 2023-02-03 PROCEDURE — 4010F PR ACE/ARB THEARPY RXD/TAKEN: ICD-10-PCS | Mod: CPTII,S$GLB,, | Performed by: INTERNAL MEDICINE

## 2023-02-03 PROCEDURE — 1159F MED LIST DOCD IN RCRD: CPT | Mod: CPTII,S$GLB,, | Performed by: INTERNAL MEDICINE

## 2023-02-03 PROCEDURE — 1125F AMNT PAIN NOTED PAIN PRSNT: CPT | Mod: CPTII,S$GLB,, | Performed by: INTERNAL MEDICINE

## 2023-02-03 PROCEDURE — 3288F FALL RISK ASSESSMENT DOCD: CPT | Mod: CPTII,S$GLB,, | Performed by: INTERNAL MEDICINE

## 2023-02-03 PROCEDURE — 3080F PR MOST RECENT DIASTOLIC BLOOD PRESSURE >= 90 MM HG: ICD-10-PCS | Mod: CPTII,S$GLB,, | Performed by: INTERNAL MEDICINE

## 2023-02-03 PROCEDURE — 99215 PR OFFICE/OUTPT VISIT, EST, LEVL V, 40-54 MIN: ICD-10-PCS | Mod: S$GLB,,, | Performed by: INTERNAL MEDICINE

## 2023-02-03 PROCEDURE — 3008F PR BODY MASS INDEX (BMI) DOCUMENTED: ICD-10-PCS | Mod: CPTII,S$GLB,, | Performed by: INTERNAL MEDICINE

## 2023-02-03 RX ORDER — HYDROCODONE BITARTRATE AND ACETAMINOPHEN 10; 325 MG/1; MG/1
TABLET ORAL
COMMUNITY
Start: 2023-01-30

## 2023-02-03 RX ORDER — CYCLOBENZAPRINE HCL 10 MG
TABLET ORAL
COMMUNITY
Start: 2022-12-17

## 2023-02-03 NOTE — PROGRESS NOTES
Subjective:    Patient ID:  Gregorio Guerra is a 67 y.o. male     Chief Complaint   Patient presents with    Atrial Fibrillation       HPI:  Mr Gregorio Guerra is a 67 y.o. male is here for follow-up.    Patient has been doing well no specific complaints at the present time.  He does have a bad back and he is coping with it.  And he is unable to do adequate exercises because of his back pain.    His breathing has been good denies any shortness of breath or difficulty in breathing denies any chest pain tightness or heaviness denies any dizziness or lightheadedness denies any loss of consciousness falls or head injury.    He is taking all his medications regularly.    And denies any bleeding issues of blood in the stool urine.        Review of patient's allergies indicates:   Allergen Reactions    Lisinopril      Other reaction(s): Other (See Comments)       Past Medical History:   Diagnosis Date    A-fib     Arthritis     Current use of long term anticoagulation 2/1/2019    Hypertension     Septic olecranon bursitis, left 6/14/2021     Past Surgical History:   Procedure Laterality Date    CARDIAC SURGERY      Correct abnormal heart rhythm    EPIDURAL STEROID INJECTION N/A 4/4/2022    Procedure: Injection, Steroid, Epidural;  Surgeon: Lita Billings MD;  Location: [facility] OR;  Service: Pain Management;  Laterality: N/A;  L3-4     EXPLORATORY LAPAROTOMY  1986    KNEE SURGERY      TOTAL HIP ARTHROPLASTY Right 10/29/2018    anterior approach (depuy hardware)    TOTAL HIP ARTHROPLASTY Right     TREATMENT OF CARDIAC ARRHYTHMIA N/A 6/26/2020    Procedure: CARDIOVERSION;  Surgeon: Rogelio Pa MD;  Location: [facility] CATH/EP LAB;  Service: Cardiology;  Laterality: N/A;     Social History     Tobacco Use    Smoking status: Every Day     Packs/day: 0.50     Types: Cigarettes    Smokeless tobacco: Never   Substance Use Topics    Alcohol use: Yes    Drug use: No     Family History   Problem Relation Age of Onset     Breast cancer Mother     Breast cancer Sister         Review of Systems:   Constitution: Negative for diaphoresis and fever.   HEENT: Negative for nosebleeds.    Cardiovascular: Negative for chest pain       No dyspnea on exertion       No leg swelling        No palpitations  Respiratory: Negative for shortness of breath and wheezing.    Hematologic/Lymphatic: Negative for bleeding problem. Does not bruise/bleed easily.   Skin: Negative for color change and rash.   Musculoskeletal: Negative for falls and myalgias.  Chronic back pain.    Gastrointestinal: Negative for hematemesis and hematochezia.   Genitourinary: Negative for hematuria.   Neurological: Negative for dizziness and light-headedness.   Psychiatric/Behavioral: Negative for altered mental status and memory loss.          Objective:        Vitals:    02/03/23 1153   BP: (!) 140/90   Pulse: 72   Resp: 16       Lab Results   Component Value Date    WBC 6.93 06/16/2021    HGB 14.7 06/16/2021    HCT 42.5 06/16/2021     (L) 06/16/2021    CHOL 174 08/16/2005    TRIG 79 08/16/2005    HDL 52.0 08/16/2005    ALT 32 06/16/2021    AST 22 06/16/2021     06/16/2021    K 3.9 06/16/2021     06/16/2021    CREATININE 0.9 06/16/2021    BUN 11 06/16/2021    CO2 25 06/16/2021    PSA 0.6 08/16/2005    INR 1.4 06/14/2021        ECHOCARDIOGRAM RESULTS  Results for orders placed during the hospital encounter of 04/06/21    Echo Color Flow Doppler? Yes    Interpretation Summary  · The left ventricle is normal in size with concentric remodeling and normal systolic function.  · The estimated ejection fraction is 60%.  · Normal left ventricular diastolic function.  · Normal right ventricular size with normal right ventricular systolic function.  · Mild left atrial enlargement.  · Mild mitral regurgitation.        CURRENT/PREVIOUS VISIT EKG  Results for orders placed or performed in visit on 01/20/23   IN OFFICE EKG 12-LEAD (to [place])    Collection Time: 01/20/23  11:25 AM    Narrative    Test Reason : I50.32,I25.10,    Vent. Rate : 074 BPM     Atrial Rate : 074 BPM     P-R Int : 152 ms          QRS Dur : 116 ms      QT Int : 422 ms       P-R-T Axes : 027 -11 -11 degrees     QTc Int : 468 ms    Sinus rhythm with Premature atrial complexes  Incomplete right bundle branch block  Borderline Abnormal ECG  When compared with ECG of 23-MAR-2021 14:31,  Premature atrial complexes are now Present  T wave inversion now evident in Inferior leads  Confirmed by Leonel Velasco MD ([id number]) on 1/29/2023 4:07:00 PM    Referred By:             Confirmed By:Leonel Velasco MD     No valid procedures specified.   Results for orders placed during the hospital encounter of 04/06/21    Nuclear Stress - Cardiology Interpreted    Interpretation Summary    Abnormal myocardial perfusion scan.    There is a moderate to severe intensity, moderate to large sized, mostly fixed with some reversibilty defect in the basal to distal inferior wall(s).    There is a second small sized, mild to moderate intensity,  in the apex and anterobasilar wall(s).    The gated perfusion images showed an ejection fraction of 44% post stress. Normal ejection fraction is greater than 53%.    The EKG portion of this study is negative for ischemia.    The patient reported no chest pain during the stress test.      Physical Exam:  CONSTITUTIONAL: No fever, no chills  HEENT: Normocephalic, atraumatic,pupils reactive to light                 NECK:  No JVD no carotid bruit  CVS: S1S2+, RRR, systolic murmurs,   LUNGS: Clear  ABDOMEN: Soft, NT, BS+ obese  EXTREMITIES: No cyanosis, edema  : No john catheter  NEURO: AAO X 3  PSY: Normal affect      Medication List with Changes/Refills   Current Medications    ALBUTEROL (VENTOLIN HFA) 90 MCG/ACTUATION INHALER    Inhale 2 puffs into the lungs every 6 (six) hours as needed for Wheezing. Rescue    COLCHICINE (COLCRYS) 0.6 MG TABLET    Take 1 tablet (0.6 mg total) by mouth once daily. for 14  days    CYCLOBENZAPRINE (FLEXERIL) 10 MG TABLET        ELIQUIS 5 MG TAB    Take 1 tablet (5 mg total) by mouth 2 (two) times daily.    FUROSEMIDE (LASIX) 20 MG TABLET    Take 1 tablet (20 mg total) by mouth once daily.    HYDROCODONE-ACETAMINOPHEN (NORCO)  MG PER TABLET    Take by mouth.    IPRATROPIUM (ATROVENT) 0.02 % NEBULIZER SOLUTION    Take 2.5 mLs (500 mcg total) by nebulization every 6 (six) hours as needed for Wheezing. Rescue    LOSARTAN (COZAAR) 25 MG TABLET    Take 1 tablet (25 mg total) by mouth once daily.    METOPROLOL SUCCINATE (TOPROL-XL) 50 MG 24 HR TABLET    Take 1 tablet (50 mg total) by mouth once daily.    SILDENAFIL (VIAGRA) 50 MG TABLET    TAKE 1 TABLET BY MOUTH AS NEEDED FOR ERECTILE DYSFUNCTION    SPIRONOLACTONE (ALDACTONE) 25 MG TABLET    Take 1 tablet (25 mg total) by mouth once daily.   Discontinued Medications    HYDROCODONE-ACETAMINOPHEN (NORCO) 7.5-325 MG PER TABLET    Take 1 tablet by mouth every 6 (six) hours as needed for Pain.    METOPROLOL SUCCINATE (TOPROL-XL) 50 MG 24 HR TABLET    Take 1 tablet (50 mg total) by mouth once daily.    OXYCODONE-ACETAMINOPHEN (PERCOCET) 5-325 MG PER TABLET    Take 1 tablet by mouth every 12 (twelve) hours as needed for Pain.             Assessment:       1. Chronic heart failure with preserved ejection fraction    2. Paroxysmal atrial fibrillation    3. CAD in native artery    4. Current every day smoker    5. Morbid obesity    6. Essential hypertension    7. Mixed hyperlipidemia         Plan:   1. Chronic diastolic heart failure   Patient is currently on furosemide 20 mg p.o. q.day continue the same.  And is also on spironolactone 25 mg p.o. q.day.  2. Paroxysmal atrial fibrillation   Patient is currently on Eliquis 5 mg p.o. b.i.d. continue the same no bleeding issues.    He is on metoprolol succinate 50 mg p.o. q.day continue the same.    3. EKG   Patient recently had an EKG which showed normal sinus rhythm with premature atrial complexes  with heart rate of 74 beats per minute incomplete right bundle branch block and no acute ST T-wave changes.  4. Essential hypertension   Patient's blood pressure is stable at 1 40/90 and he is currently on furosemide 20 mg p.o. q.day in the spironolactone 25 mg p.o. q.day losartan 25 mg p.o. q.day and metoprolol succinate 50 mg p.o. q.day.  Continue the same.    5. Mixed hyperlipidemia     Noon will recheck his cholesterol panel.  And at the present time patient is not on any specific medication.    6. Obesity   Discussed with patient in regards with losing some weight.  Patient would do better with weight loss.  7. Continue current management and I will see him back in the office in 6 months time.  Will also order is blood work prior to that.          Problem List Items Addressed This Visit          Cardiac/Vascular    Paroxysmal atrial fibrillation    Chronic heart failure with preserved ejection fraction - Primary       Endocrine    Morbid obesity       Other    Current every day smoker     Other Visit Diagnoses       CAD in native artery        Essential hypertension        Mixed hyperlipidemia                No follow-ups on file.

## 2023-03-02 ENCOUNTER — HOSPITAL ENCOUNTER (OUTPATIENT)
Facility: HOSPITAL | Age: 68
Discharge: HOME | End: 2023-03-02
Attending: ANESTHESIOLOGY | Admitting: ANESTHESIOLOGY
Payer: MEDICARE

## 2023-03-02 VITALS
WEIGHT: 263 LBS | RESPIRATION RATE: 20 BRPM | TEMPERATURE: 98 F | HEIGHT: 73 IN | HEART RATE: 80 BPM | BODY MASS INDEX: 34.85 KG/M2 | OXYGEN SATURATION: 95 % | SYSTOLIC BLOOD PRESSURE: 141 MMHG | DIASTOLIC BLOOD PRESSURE: 101 MMHG

## 2023-03-02 DIAGNOSIS — M25.552 CHRONIC LEFT HIP PAIN: Primary | ICD-10-CM

## 2023-03-02 DIAGNOSIS — G89.29 CHRONIC LEFT HIP PAIN: Primary | ICD-10-CM

## 2023-03-02 PROCEDURE — 25500020 PHARM REV CODE 255: Performed by: ANESTHESIOLOGY

## 2023-03-02 PROCEDURE — 25000003 PHARM REV CODE 250: Performed by: ANESTHESIOLOGY

## 2023-03-02 PROCEDURE — 20610 DRAIN/INJ JOINT/BURSA W/O US: CPT | Mod: LT | Performed by: ANESTHESIOLOGY

## 2023-03-02 PROCEDURE — 63600175 PHARM REV CODE 636 W HCPCS: Performed by: ANESTHESIOLOGY

## 2023-03-02 RX ORDER — BUPIVACAINE HYDROCHLORIDE 5 MG/ML
INJECTION, SOLUTION EPIDURAL; INTRACAUDAL
Status: DISCONTINUED | OUTPATIENT
Start: 2023-03-02 | End: 2023-03-02 | Stop reason: HOSPADM

## 2023-03-02 RX ORDER — SODIUM CHLORIDE, SODIUM LACTATE, POTASSIUM CHLORIDE, CALCIUM CHLORIDE 600; 310; 30; 20 MG/100ML; MG/100ML; MG/100ML; MG/100ML
INJECTION, SOLUTION INTRAVENOUS ONCE AS NEEDED
Status: DISCONTINUED | OUTPATIENT
Start: 2023-03-02 | End: 2023-03-02 | Stop reason: HOSPADM

## 2023-03-02 RX ORDER — LIDOCAINE HYDROCHLORIDE 10 MG/ML
INJECTION INFILTRATION; PERINEURAL
Status: DISCONTINUED | OUTPATIENT
Start: 2023-03-02 | End: 2023-03-02 | Stop reason: HOSPADM

## 2023-03-02 RX ORDER — METHYLPREDNISOLONE ACETATE 80 MG/ML
INJECTION, SUSPENSION INTRA-ARTICULAR; INTRALESIONAL; INTRAMUSCULAR; SOFT TISSUE
Status: DISCONTINUED | OUTPATIENT
Start: 2023-03-02 | End: 2023-03-02 | Stop reason: HOSPADM

## 2023-03-02 RX ORDER — FENTANYL CITRATE 50 UG/ML
INJECTION, SOLUTION INTRAMUSCULAR; INTRAVENOUS
Status: DISCONTINUED | OUTPATIENT
Start: 2023-03-02 | End: 2023-03-02 | Stop reason: HOSPADM

## 2023-03-02 RX ORDER — MIDAZOLAM HYDROCHLORIDE 5 MG/ML
INJECTION INTRAMUSCULAR; INTRAVENOUS
Status: DISCONTINUED | OUTPATIENT
Start: 2023-03-02 | End: 2023-03-02 | Stop reason: HOSPADM

## 2023-03-02 NOTE — OP NOTE
PROCEDURE DATE: 3/2/2023    PROCEDURE:  Left  Hip joint injection under fluoroscopy.      Diagnosis: Chronic left hip pain  Post Op diagnosis: Same       PHYSICIAN: Lita Billings MD                                                                               Local anesthetic:  Lidocaine 1%, 4 ml total                                                                                                              MEDICATIONS INJECTED:  Methylprednisone 80mg and bupivacaine 0.25% 4 mL                                                                             ESTIMATED BLOOD LOSS:  none                                                                                                                             COMPLICATIONS:  none                                                                                                                                    TECHNIQUE: A time-out taken to identify patient and procedure side prior to starting the procedure.  With the patient lying in the supine position, the left greater trochanter, femoral neck and femoral head were visualized. The area was prepped and draped in the usual sterile fashion.  Local anesthetic was used, given by raising a wheal and going down to the hub of the 25-gauge needle 1.5inch needle.  A 3.5inch 22-gauge needle was introduced under fluoroscopy to the lateral portion of the femoral neck and then walked medially to enter the hip joint capsule.  When the tip of the needle was presumed to be in appropriate position, 2 mL contrast was injected and noted to spread throughout the joint space.  After negative aspiration for blood, the medication as noted above was then injected slowly.  The patient tolerated the procedure well.                                                                                                                                                                                                The patient was monitored after the  procedure and was given post procedure and discharge instructions to follow at home. The patient was discharged in a stable condition.

## 2023-03-02 NOTE — DISCHARGE INSTRUCTIONS

## 2023-03-02 NOTE — PLAN OF CARE
Has met unit/department guidelines for discharge from each phase of the post procedure continuum. Leaving floor per w/c with RN. RICARDO x3. Resp even and unlabored room air. No distress noted. Tolerating Po fluids without c/o nausea/vomiting. Neuro checks WNL. Dsg clean, dry and intact. All personal belongings returned to pt.

## 2023-03-02 NOTE — DISCHARGE SUMMARY
[facility] Surgery  Discharge Note  Short Stay    Procedure(s) (LRB):  INJECTION, JOINT, SHOULDER, HIP, OR KNEE (Left)      OUTCOME: Patient tolerated treatment/procedure well without complication and is now ready for discharge.    DISPOSITION: Home or Self Care    FINAL DIAGNOSIS:  Chronic left hip pain    FOLLOWUP: In clinic    DISCHARGE INSTRUCTIONS:    Discharge Procedure Orders   Diet general     Call MD for:  temperature >100.4     Call MD for:  persistent nausea and vomiting     Call MD for:  severe uncontrolled pain     Call MD for:  difficulty breathing, headache or visual disturbances     Call MD for:  redness, tenderness, or signs of infection (pain, swelling, redness, odor or green/yellow discharge around incision site)     Call MD for:  hives     Call MD for:  persistent dizziness or light-headedness     Call MD for:  extreme fatigue        TIME SPENT ON DISCHARGE: 30 minutes

## 2023-03-02 NOTE — H&P
"FOLLOW UP NOTE:     CHIEF COMPLAINT: left hip pain    INTERVAL HISTORY OF PRESENT ILLNESS: Gregorio Guerra is a 67 y.o. male who presents for left hip intra-articular steroid injection under fluoroscopic guidance. The patient denies of any significant changes in his health since his last appointment. The patient also denies of any changes in the character of his pain since his last appointment.     ROS:  Review of Systems   Constitutional:  Negative for chills and fever.   HENT:  Negative for tinnitus.    Eyes:  Negative for visual disturbance.   Respiratory:  Negative for shortness of breath.    Cardiovascular:  Negative for chest pain.   Gastrointestinal:  Negative for nausea and vomiting.   Genitourinary:  Negative for difficulty urinating.   Musculoskeletal:  Positive for arthralgias and back pain.   Skin:  Negative for rash.   Allergic/Immunologic: Negative for immunocompromised state.   Neurological:  Negative for syncope.   Hematological:  Does not bruise/bleed easily.   Psychiatric/Behavioral:  Negative for suicidal ideas.       MEDICAL, SURGICAL, FAMILY, SOCIAL HX: reviewed    MEDICATIONS/ALLERGIES: reviewed    PHYSICAL EXAM:    VITALS: Vitals reviewed.   Vitals:    03/02/23 0829   BP: (!) 167/104   Pulse: 82   Resp: 12   Temp: 98.1 °F (36.7 °C)   TempSrc: Oral   SpO2: 95%   Weight: 119.3 kg (263 lb)   Height: 6' 1" (1.854 m)       Physical Exam  Vitals and nursing note reviewed.   Constitutional:       Appearance: Normal appearance. He is not toxic-appearing or diaphoretic.   HENT:      Head: Normocephalic and atraumatic.   Eyes:      General:         Right eye: No discharge.         Left eye: No discharge.      Extraocular Movements: Extraocular movements intact.      Conjunctiva/sclera: Conjunctivae normal.   Cardiovascular:      Rate and Rhythm: Normal rate.   Pulmonary:      Effort: Pulmonary effort is normal. No respiratory distress.      Breath sounds: Normal breath sounds.   Abdominal:      " Palpations: Abdomen is soft.   Skin:     General: Skin is warm and dry.      Findings: No rash.   Neurological:      Mental Status: He is alert and oriented to person, place, and time.   Psychiatric:         Mood and Affect: Mood and affect normal.         Cognition and Memory: Memory normal.         Judgment: Judgment normal.        EXTREMITIES:    Gen: No cyanosis, edema, varicosities, or tenderness to palpation BLE   Skin: Warm, pink, dry, no rashes, no lesions BLE   Strength: 5/5 motor strength BLE   ROM: hips, knees and ankles without pain or instability.     NEUROLOGICAL:    Gen: No clonus or spasticity.   Gait: Normal without antalgic lean   DTR's: 2+ in bilateral patellar, and ankle   BABINSKI: Absent bilaterally  Sensory: Intact to light touch and proprioception BLE    ASSESSMENT: Gregorio Guerra is a 67 y.o. male who presents for left hip intra-articular steroid injection under fluoroscopic guidance.     PLAN:  Proceed with left hip intra-articular steroid injection under fluoroscopic guidance as previously discussed.    This patient has been cleared for surgery in an ambulatory surgical facility    ASA 3,  Mallampatti Score 3  No history of anesthetic complications  Plan for RN IV sedation    Lita Billings MD  Pain Management

## 2023-05-16 RX ORDER — SPIRONOLACTONE 25 MG/1
25 TABLET ORAL DAILY
Qty: 90 TABLET | Refills: 1 | Status: SHIPPED | OUTPATIENT
Start: 2023-05-16 | End: 2023-11-16 | Stop reason: SDUPTHER

## 2023-06-27 ENCOUNTER — HOSPITAL ENCOUNTER (OUTPATIENT)
Facility: HOSPITAL | Age: 68
Discharge: HOME | End: 2023-06-27
Attending: ANESTHESIOLOGY | Admitting: ANESTHESIOLOGY
Payer: MEDICARE

## 2023-06-27 VITALS
DIASTOLIC BLOOD PRESSURE: 88 MMHG | BODY MASS INDEX: 34.46 KG/M2 | TEMPERATURE: 97 F | HEART RATE: 85 BPM | WEIGHT: 260 LBS | HEIGHT: 73 IN | SYSTOLIC BLOOD PRESSURE: 134 MMHG | OXYGEN SATURATION: 96 % | RESPIRATION RATE: 16 BRPM

## 2023-06-27 DIAGNOSIS — G89.29 CHRONIC LEFT HIP PAIN: Primary | ICD-10-CM

## 2023-06-27 DIAGNOSIS — M25.552 CHRONIC LEFT HIP PAIN: Primary | ICD-10-CM

## 2023-06-27 PROCEDURE — 25000003 PHARM REV CODE 250: Performed by: ANESTHESIOLOGY

## 2023-06-27 PROCEDURE — 20610 DRAIN/INJ JOINT/BURSA W/O US: CPT | Mod: LT | Performed by: ANESTHESIOLOGY

## 2023-06-27 PROCEDURE — 63600175 PHARM REV CODE 636 W HCPCS: Performed by: ANESTHESIOLOGY

## 2023-06-27 PROCEDURE — 25500020 PHARM REV CODE 255: Performed by: ANESTHESIOLOGY

## 2023-06-27 RX ORDER — FENTANYL CITRATE 50 UG/ML
INJECTION, SOLUTION INTRAMUSCULAR; INTRAVENOUS
Status: DISCONTINUED | OUTPATIENT
Start: 2023-06-27 | End: 2023-06-27 | Stop reason: HOSPADM

## 2023-06-27 RX ORDER — LIDOCAINE HYDROCHLORIDE 10 MG/ML
INJECTION INFILTRATION; PERINEURAL
Status: DISCONTINUED | OUTPATIENT
Start: 2023-06-27 | End: 2023-06-27 | Stop reason: HOSPADM

## 2023-06-27 RX ORDER — SODIUM CHLORIDE, SODIUM LACTATE, POTASSIUM CHLORIDE, CALCIUM CHLORIDE 600; 310; 30; 20 MG/100ML; MG/100ML; MG/100ML; MG/100ML
INJECTION, SOLUTION INTRAVENOUS ONCE AS NEEDED
Status: COMPLETED | OUTPATIENT
Start: 2023-06-27 | End: 2023-06-27

## 2023-06-27 RX ORDER — MIDAZOLAM HYDROCHLORIDE 5 MG/ML
INJECTION INTRAMUSCULAR; INTRAVENOUS
Status: DISCONTINUED | OUTPATIENT
Start: 2023-06-27 | End: 2023-06-27 | Stop reason: HOSPADM

## 2023-06-27 RX ORDER — BUPIVACAINE HYDROCHLORIDE 5 MG/ML
INJECTION, SOLUTION EPIDURAL; INTRACAUDAL
Status: DISCONTINUED | OUTPATIENT
Start: 2023-06-27 | End: 2023-06-27 | Stop reason: HOSPADM

## 2023-06-27 RX ORDER — METHYLPREDNISOLONE ACETATE 80 MG/ML
INJECTION, SUSPENSION INTRA-ARTICULAR; INTRALESIONAL; INTRAMUSCULAR; SOFT TISSUE
Status: DISCONTINUED | OUTPATIENT
Start: 2023-06-27 | End: 2023-06-27 | Stop reason: HOSPADM

## 2023-06-27 RX ADMIN — SODIUM CHLORIDE, SODIUM LACTATE, POTASSIUM CHLORIDE, AND CALCIUM CHLORIDE: .6; .31; .03; .02 INJECTION, SOLUTION INTRAVENOUS at 08:06

## 2023-06-27 NOTE — OP NOTE
PROCEDURE DATE: 6/27/2023    PROCEDURE:  Left Hip joint injection under fluoroscopy.      Diagnosis: Chronic left hip pain  Post Op diagnosis: Same       PHYSICIAN: Lita Billings MD                                                                               Local anesthetic:  Lidocaine 1%, 4 ml total                                                                                                              MEDICATIONS INJECTED:  Methylprednisone 80mg and bupivacaine 0.5% 4 mL     ANESTHESIA: RN IV sedation                                                                            ESTIMATED BLOOD LOSS:  none                                                                                                                             COMPLICATIONS:  none                                                                                                                                    TECHNIQUE: A time-out taken to identify patient and procedure side prior to starting the procedure.  With the patient lying in the supine position, the left greater trochanter, femoral neck and femoral head were visualized. The area was prepped and draped in the usual sterile fashion.  Local anesthetic was used, given by raising a wheal and going down to the hub of the 25-gauge needle 1.5inch needle.  A 3.5inch 22-gauge needle was introduced under fluoroscopy to the lateral portion of the femoral neck and then walked medially to enter the hip joint capsule.  When the tip of the needle was presumed to be in appropriate position, 2 mL contrast was injected and noted to spread throughout the joint space.  After negative aspiration for blood, the medication as noted above was then injected slowly.  The patient tolerated the procedure well.                                                                                                                                                                                                The patient  was monitored after the procedure and was given post procedure and discharge instructions to follow at home. The patient was discharged in a stable condition.

## 2023-06-27 NOTE — H&P
"FOLLOW UP NOTE:     CHIEF COMPLAINT: left hip pain    INTERVAL HISTORY OF PRESENT ILLNESS: Gregorio Guerra is a 68 y.o. male who presents for left hip intra-articular steroid injection under fluoroscopic guidance. The patient denies of any significant changes in his health since his last appointment. The patient also denies of any changes in the character of his pain since his last appointment.     ROS:  Review of Systems   Constitutional:  Negative for chills and fever.   HENT:  Negative for tinnitus.    Eyes:  Negative for visual disturbance.   Respiratory:  Negative for shortness of breath.    Cardiovascular:  Negative for chest pain.   Gastrointestinal:  Negative for nausea and vomiting.   Genitourinary:  Negative for difficulty urinating.   Musculoskeletal:  Positive for arthralgias and back pain.   Skin:  Negative for rash.   Allergic/Immunologic: Negative for immunocompromised state.   Neurological:  Negative for syncope.   Hematological:  Does not bruise/bleed easily.   Psychiatric/Behavioral:  Negative for suicidal ideas.       MEDICAL, SURGICAL, FAMILY, SOCIAL HX: reviewed    MEDICATIONS/ALLERGIES: reviewed    PHYSICAL EXAM:    VITALS: Vitals reviewed.   Vitals:    06/27/23 0812   BP: 122/79   Pulse: 84   Resp: 12   Temp: 97.8 °F (36.6 °C)   TempSrc: Oral   SpO2: 95%   Weight: 117.9 kg (260 lb)   Height: 6' 1" (1.854 m)       Physical Exam  Vitals and nursing note reviewed.   Constitutional:       Appearance: Normal appearance. He is not toxic-appearing or diaphoretic.   HENT:      Head: Normocephalic and atraumatic.   Eyes:      General:         Right eye: No discharge.         Left eye: No discharge.      Extraocular Movements: Extraocular movements intact.      Conjunctiva/sclera: Conjunctivae normal.   Cardiovascular:      Rate and Rhythm: Normal rate.   Pulmonary:      Effort: Pulmonary effort is normal. No respiratory distress.      Breath sounds: Normal breath sounds.   Abdominal:      " Palpations: Abdomen is soft.   Skin:     General: Skin is warm and dry.      Findings: No rash.   Neurological:      Mental Status: He is alert and oriented to person, place, and time.   Psychiatric:         Mood and Affect: Mood and affect normal.         Cognition and Memory: Memory normal.         Judgment: Judgment normal.        EXTREMITIES:    Gen: No cyanosis, edema, varicosities, or tenderness to palpation BLE   Skin: Warm, pink, dry, no rashes, no lesions BLE   Strength: 5/5 motor strength BLE   ROM: hips, knees and ankles without pain or instability.     NEUROLOGICAL:    Gen: No clonus or spasticity.   Gait: Normal without antalgic lean   DTR's: 2+ in bilateral patellar, and ankle   BABINSKI: Absent bilaterally  Sensory: Intact to light touch and proprioception BLE    ASSESSMENT: Gregorio Guerra is a 68 y.o. male who presents for left hip intra-articular steroid injection under fluoroscopic guidance.     PLAN:  Proceed with left hip intra-articular steroid injection under fluoroscopic guidance as previously discussed.    This patient has been cleared for surgery in an ambulatory surgical facility    ASA 3,  Mallampatti Score 3  No history of anesthetic complications  Plan for RN IV sedation    Lita Billings MD  Pain Management

## 2023-06-27 NOTE — DISCHARGE SUMMARY
[facility] Surgery  Discharge Note  Short Stay    Procedure(s) (LRB):  INTRA-ARTICULAR INJECTION HIP LEFT SIDE UNDER FLUOROSCOPIC GUIDANCE (Left)      OUTCOME: Patient tolerated treatment/procedure well without complication and is now ready for discharge.    DISPOSITION: Home or Self Care    FINAL DIAGNOSIS:  Chronic left hip pain    FOLLOWUP: In clinic    DISCHARGE INSTRUCTIONS:    Discharge Procedure Orders   Diet general     Call MD for:  temperature >100.4     Call MD for:  persistent nausea and vomiting     Call MD for:  severe uncontrolled pain     Call MD for:  difficulty breathing, headache or visual disturbances     Call MD for:  redness, tenderness, or signs of infection (pain, swelling, redness, odor or green/yellow discharge around incision site)     Call MD for:  hives     Call MD for:  persistent dizziness or light-headedness     Call MD for:  extreme fatigue        TIME SPENT ON DISCHARGE: 30 minutes  
The procedure was performed independently

## 2023-07-07 ENCOUNTER — TELEPHONE (OUTPATIENT)
Dept: PAIN MEDICINE | Facility: CLINIC | Age: 68
End: 2023-07-07
Payer: MEDICARE

## 2023-07-07 NOTE — TELEPHONE ENCOUNTER
Dr kendrick no longer works at [facility] [place] pts need to be scheduled through [facility] not [facility].

## 2023-07-07 NOTE — TELEPHONE ENCOUNTER
----- Message from Cory Ward sent at 7/7/2023  9:50 AM CDT -----  Regarding: appt time for surgery  Contact: wife at [phone]  Type: Needs Medical Advice    Who Called:  wife // brittany    Best Call Back Number: [phone]    Additional Information: pt has procedure with Dr Billings on 7/11. Wife also has procedure with Dr Billings on same day. Wants to know if can get close together so only need one /one trip to hospital

## 2023-07-10 RX ORDER — SODIUM CHLORIDE, SODIUM LACTATE, POTASSIUM CHLORIDE, CALCIUM CHLORIDE 600; 310; 30; 20 MG/100ML; MG/100ML; MG/100ML; MG/100ML
INJECTION, SOLUTION INTRAVENOUS ONCE AS NEEDED
Status: CANCELLED | OUTPATIENT
Start: 2023-07-10 | End: 2034-12-06

## 2023-07-11 ENCOUNTER — HOSPITAL ENCOUNTER (OUTPATIENT)
Facility: HOSPITAL | Age: 68
Discharge: HOME | End: 2023-07-11
Attending: ANESTHESIOLOGY | Admitting: ANESTHESIOLOGY
Payer: MEDICARE

## 2023-07-11 VITALS
OXYGEN SATURATION: 95 % | DIASTOLIC BLOOD PRESSURE: 88 MMHG | TEMPERATURE: 97 F | BODY MASS INDEX: 34.46 KG/M2 | RESPIRATION RATE: 18 BRPM | HEIGHT: 73 IN | WEIGHT: 260 LBS | SYSTOLIC BLOOD PRESSURE: 123 MMHG | HEART RATE: 87 BPM

## 2023-07-11 DIAGNOSIS — M54.16 LUMBAR RADICULOPATHY: Primary | ICD-10-CM

## 2023-07-11 PROCEDURE — 25500020 PHARM REV CODE 255: Performed by: ANESTHESIOLOGY

## 2023-07-11 PROCEDURE — 64483 NJX AA&/STRD TFRM EPI L/S 1: CPT | Mod: 50 | Performed by: ANESTHESIOLOGY

## 2023-07-11 PROCEDURE — 25000003 PHARM REV CODE 250: Performed by: ANESTHESIOLOGY

## 2023-07-11 PROCEDURE — 63600175 PHARM REV CODE 636 W HCPCS: Performed by: ANESTHESIOLOGY

## 2023-07-11 RX ORDER — BUPIVACAINE HYDROCHLORIDE 2.5 MG/ML
INJECTION, SOLUTION EPIDURAL; INFILTRATION; INTRACAUDAL
Status: DISCONTINUED | OUTPATIENT
Start: 2023-07-11 | End: 2023-07-11 | Stop reason: HOSPADM

## 2023-07-11 RX ORDER — LIDOCAINE HYDROCHLORIDE 10 MG/ML
INJECTION INFILTRATION; PERINEURAL
Status: DISCONTINUED | OUTPATIENT
Start: 2023-07-11 | End: 2023-07-11 | Stop reason: HOSPADM

## 2023-07-11 RX ORDER — DEXAMETHASONE SODIUM PHOSPHATE 4 MG/ML
INJECTION, SOLUTION INTRA-ARTICULAR; INTRALESIONAL; INTRAMUSCULAR; INTRAVENOUS; SOFT TISSUE
Status: DISCONTINUED | OUTPATIENT
Start: 2023-07-11 | End: 2023-07-11 | Stop reason: HOSPADM

## 2023-07-11 RX ORDER — MIDAZOLAM HYDROCHLORIDE 5 MG/ML
INJECTION INTRAMUSCULAR; INTRAVENOUS
Status: DISCONTINUED | OUTPATIENT
Start: 2023-07-11 | End: 2023-07-11 | Stop reason: HOSPADM

## 2023-07-11 RX ORDER — FENTANYL CITRATE 50 UG/ML
INJECTION, SOLUTION INTRAMUSCULAR; INTRAVENOUS
Status: DISCONTINUED | OUTPATIENT
Start: 2023-07-11 | End: 2023-07-11 | Stop reason: HOSPADM

## 2023-07-11 NOTE — PLAN OF CARE
Has met unit/department guidelines for discharge from each phase of the post procedure continuum. Leaving floor per w/c with RN DUSTINO x3. Resp even and unlabored room air. No distress noted. Tolerating Po fluids without c/o nausea/vomiting. Denies c/o pain. All personal belongings returned to pt.

## 2023-07-11 NOTE — DISCHARGE SUMMARY
[facility] - Surgery  Discharge Note  Short Stay    Procedure(s) (LRB):  LUMBAR TF-MYRNA BILATERAL L2-L3 (Bilateral)      OUTCOME: Patient tolerated treatment/procedure well without complication and is now ready for discharge.    DISPOSITION: Home or Self Care    FINAL DIAGNOSIS:  Lumbar radiculopathy    FOLLOWUP: In clinic    DISCHARGE INSTRUCTIONS:    Discharge Procedure Orders   Diet general     Call MD for:  temperature >100.4     Call MD for:  persistent nausea and vomiting     Call MD for:  severe uncontrolled pain     Call MD for:  difficulty breathing, headache or visual disturbances     Call MD for:  redness, tenderness, or signs of infection (pain, swelling, redness, odor or green/yellow discharge around incision site)     Call MD for:  hives     Call MD for:  persistent dizziness or light-headedness     Call MD for:  extreme fatigue        TIME SPENT ON DISCHARGE: 30 minutes

## 2023-07-11 NOTE — OP NOTE
PROCEDURE DATE: 7/11/2023    PROCEDURE: Bilateral L2-L3 transforaminal epidural steroid injection under fluoroscopy    DIAGNOSIS: M51.16 Intervertebral disc disorders with radiculopathy, lumbar region  Post op diagnosis: Same    PHYSICIAN: Lita Billings MD    MEDICATIONS INJECTED:  Dexamethasone 5mg (0.5ml) and 1.5ml 0.25% bupivicaine at each nerve root.     LOCAL ANESTHETIC INJECTED:  Lidocaine 1%. 4 ml per site.    SEDATION MEDICATIONS: RN IV sedation    ESTIMATED BLOOD LOSS:  none    COMPLICATIONS:  none    TECHNIQUE:   A time-out was taken to identify patient and procedure side prior to starting the procedure. The patient was placed in a prone position, prepped and draped in the usual sterile fashion using ChloraPrep and sterile towels.  The area to be injected was determined under fluoroscopic guidance in AP and oblique view.  Local anesthetic was given by raising a wheal and going down to the hub of a 25-gauge 1.5 inch needle.  In oblique view, a 5 inch 22-gauge bent-tip spinal needle was introduced towards 6 oclock position of the pedicle of each above named nerve root level.  The needle was walked medially then hinged into the neural foramen and position was confirmed in AP and lateral views.  2 mL contrast dye was injected to confirm appropriate placement and that there was no vascular uptake.  After negative aspiration for blood or CSF, the medication was then injected. This was performed at the L2-L3 level(s). The patient tolerated the procedure well.    The patient was monitored after the procedure.  Patient was given post procedure and discharge instructions to follow at home. The patient was discharged in a stable condition.

## 2023-07-11 NOTE — H&P
"FOLLOW UP NOTE:     CHIEF COMPLAINT: back pain    INTERVAL HISTORY OF PRESENT ILLNESS: Gregorio Guerra is a 68 y.o. male who presents for bilateral L2-L3 transforaminal epidural steroid injection. The patient denies of any significant changes in his health since his last appointment. The patient also denies of any changes in the character of his pain since his last appointment.     ROS:  Review of Systems   Constitutional:  Negative for chills and fever.   HENT:  Negative for tinnitus.    Eyes:  Negative for visual disturbance.   Respiratory:  Negative for shortness of breath.    Cardiovascular:  Negative for chest pain.   Gastrointestinal:  Negative for nausea and vomiting.   Genitourinary:  Negative for difficulty urinating.   Musculoskeletal:  Positive for back pain.   Skin:  Negative for rash.   Allergic/Immunologic: Negative for immunocompromised state.   Neurological:  Negative for syncope.   Hematological:  Does not bruise/bleed easily.   Psychiatric/Behavioral:  Negative for suicidal ideas.       MEDICAL, SURGICAL, FAMILY, SOCIAL HX: reviewed    MEDICATIONS/ALLERGIES: reviewed    PHYSICAL EXAM:    VITALS: Vitals reviewed.   Vitals:    07/11/23 0706   BP: (!) 132/94   Pulse: 88   Resp: 18   Temp: 98.4 °F (36.9 °C)   TempSrc: Oral   SpO2: 95%   Weight: 117.9 kg (260 lb)   Height: 6' 1" (1.854 m)       Physical Exam  Vitals and nursing note reviewed.   Constitutional:       Appearance: Normal appearance. He is not toxic-appearing or diaphoretic.   HENT:      Head: Normocephalic and atraumatic.   Eyes:      General:         Right eye: No discharge.         Left eye: No discharge.      Extraocular Movements: Extraocular movements intact.      Conjunctiva/sclera: Conjunctivae normal.   Cardiovascular:      Rate and Rhythm: Normal rate.   Pulmonary:      Effort: Pulmonary effort is normal. No respiratory distress.      Breath sounds: Normal breath sounds.   Abdominal:      Palpations: Abdomen is soft.   Skin:   "   General: Skin is warm and dry.      Findings: No rash.   Neurological:      Mental Status: He is alert and oriented to person, place, and time.   Psychiatric:         Mood and Affect: Mood and affect normal.         Cognition and Memory: Memory normal.         Judgment: Judgment normal.        EXTREMITIES:    Gen: No cyanosis, edema, varicosities, or tenderness to palpation BLE   Skin: Warm, pink, dry, no rashes, no lesions BLE   Strength: 5/5 motor strength BLE   ROM: hips, knees and ankles without pain or instability.     NEUROLOGICAL:    Gen: No clonus or spasticity.   Gait: Normal without antalgic lean   DTR's: 2+ in bilateral patellar, and ankle   BABINSKI: Absent bilaterally  Sensory: Intact to light touch and proprioception BLE    ASSESSMENT: Gregorio Guerra is a 68 y.o. male who presents for bilateral L2-L3 transforaminal epidural steroid injection.     PLAN:  Proceed with bilateral L2-L3 transforaminal epidural steroid injection as previously discussed.    This patient has been cleared for surgery in an ambulatory surgical facility    ASA 3,  Mallampatti Score 3  No history of anesthetic complications  Plan for RN IV sedation    Lita Billings MD  Pain Management

## 2023-09-07 ENCOUNTER — TELEPHONE (OUTPATIENT)
Dept: PAIN MEDICINE | Facility: CLINIC | Age: 68
End: 2023-09-07
Payer: MEDICARE

## 2023-09-07 NOTE — TELEPHONE ENCOUNTER
----- Message from Marcy Erwin sent at 9/7/2023 11:13 AM CDT -----  Contact: self  Type: Needs Medical Advice  Who Called: Patient   Best Call Back Number: [phone]  Additional Information: Pt needs MRI put on chart for his lower lumbar area. Pt states he need something fr anxiety when he take the MRI. Former pt of Dr. Billings.  Thanks

## 2023-09-07 NOTE — TELEPHONE ENCOUNTER
Dr Billings is no longer affiliated directly with [facility]. Advised pt to call [facility].

## 2023-09-08 ENCOUNTER — PATIENT MESSAGE (OUTPATIENT)
Dept: CARDIOLOGY | Facility: CLINIC | Age: 68
End: 2023-09-08
Payer: MEDICARE

## 2023-09-25 ENCOUNTER — HOSPITAL ENCOUNTER (OUTPATIENT)
Dept: RADIOLOGY | Facility: HOSPITAL | Age: 68
Discharge: HOME | End: 2023-09-25
Attending: ANESTHESIOLOGY
Payer: MEDICARE

## 2023-09-25 DIAGNOSIS — M48.062 SPINAL STENOSIS OF LUMBAR REGION WITH NEUROGENIC CLAUDICATION: ICD-10-CM

## 2023-09-25 PROCEDURE — 72148 MRI LUMBAR SPINE W/O DYE: CPT | Mod: TC

## 2023-09-25 PROCEDURE — 72148 MRI LUMBAR SPINE W/O DYE: CPT | Mod: 26,,, | Performed by: RADIOLOGY

## 2023-09-25 PROCEDURE — 72148 MRI LUMBAR SPINE WITHOUT CONTRAST: ICD-10-PCS | Mod: 26,,, | Performed by: RADIOLOGY

## 2023-10-02 ENCOUNTER — TELEPHONE (OUTPATIENT)
Dept: CARDIOLOGY | Facility: CLINIC | Age: 68
End: 2023-10-02
Payer: MEDICARE

## 2023-10-02 NOTE — TELEPHONE ENCOUNTER
----- Message from Margarita Demarco, Patient Care Assistant sent at 10/2/2023  8:59 AM CDT -----  Contact: Dr Lita Cartwright is calling to check the status of a cardiac clearance that was faxed over on 9/28.  Please call back  if any question [phone]  thanks

## 2023-10-02 NOTE — LETTER
"     2023    Gregorio Guerra  [address]  [place] [address]             [facility]  [address]  [place] [address]  Phone: [phone]  Fax: [phone] Patient: Gregorio Guerra  : 1955  Referring Doctor: Dr. Billings   Procedure: lumbar discogram    Current Outpatient Medications   Medication Sig    albuterol (VENTOLIN HFA) 90 mcg/actuation inhaler Inhale 2 puffs into the lungs every 6 (six) hours as needed for Wheezing. Rescue    apixaban (ELIQUIS) 5 mg Tab Take 1 tablet (5 mg total) by mouth 2 (two) times daily.    colchicine (COLCRYS) 0.6 mg tablet Take 1 tablet (0.6 mg total) by mouth once daily. for 14 days    cyclobenzaprine (FLEXERIL) 10 MG tablet     furosemide (LASIX) 20 MG tablet TAKE 1 TABLET BY MOUTH EVERY DAY "THANK YOU"    HYDROcodone-acetaminophen (NORCO)  mg per tablet Take by mouth.    ipratropium (ATROVENT) 0.02 % nebulizer solution Take 2.5 mLs (500 mcg total) by nebulization every 6 (six) hours as needed for Wheezing. Rescue    losartan (COZAAR) 25 MG tablet Take 1 tablet (25 mg total) by mouth once daily.    metoprolol succinate (TOPROL-XL) 50 MG 24 hr tablet Take 1 tablet (50 mg total) by mouth once daily.    sildenafiL (VIAGRA) 50 MG tablet TAKE 1 TABLET BY MOUTH AS NEEDED FOR ERECTILE DYSFUNCTION (Patient taking differently: Take 50 mg by mouth daily as needed.)    spironolactone (ALDACTONE) 25 MG tablet Take 1 tablet (25 mg total) by mouth once daily.     Current Facility-Administered Medications   Medication    ipratropium 0.02 % nebulizer solution 0.5 mg       This patient has been assessed for risk factors for clearance of surgery with the following stipulations:    ___ No contraindications  _x__ Recommendations for hold ELIQUIS  x 3days.  _x__ Cleared for surgery with moderate risks.   ___ Not cleared for surgery due to the following reasons:      If you have any " questions regarding the above, please contact my office at [phone].    Sincerely,      DIANE Raygoza  Department of Cardiology   [facility]

## 2023-10-17 ENCOUNTER — HOSPITAL ENCOUNTER (OUTPATIENT)
Facility: HOSPITAL | Age: 68
Discharge: HOME | End: 2023-10-17
Attending: ANESTHESIOLOGY | Admitting: ANESTHESIOLOGY
Payer: MEDICARE

## 2023-10-17 VITALS
SYSTOLIC BLOOD PRESSURE: 118 MMHG | HEIGHT: 73 IN | TEMPERATURE: 97 F | WEIGHT: 260 LBS | OXYGEN SATURATION: 96 % | RESPIRATION RATE: 16 BRPM | DIASTOLIC BLOOD PRESSURE: 90 MMHG | HEART RATE: 81 BPM | BODY MASS INDEX: 34.46 KG/M2

## 2023-10-17 DIAGNOSIS — M51.36 DEGENERATIVE DISC DISEASE, LUMBAR: Primary | ICD-10-CM

## 2023-10-17 PROCEDURE — 99152 MOD SED SAME PHYS/QHP 5/>YRS: CPT | Performed by: ANESTHESIOLOGY

## 2023-10-17 PROCEDURE — 25000003 PHARM REV CODE 250: Performed by: ANESTHESIOLOGY

## 2023-10-17 PROCEDURE — 62290 NJX PX DISCOGRAPHY LUMBAR: CPT | Performed by: ANESTHESIOLOGY

## 2023-10-17 PROCEDURE — 63600175 PHARM REV CODE 636 W HCPCS: Performed by: ANESTHESIOLOGY

## 2023-10-17 PROCEDURE — 25500020 PHARM REV CODE 255: Performed by: ANESTHESIOLOGY

## 2023-10-17 RX ORDER — LIDOCAINE HYDROCHLORIDE 10 MG/ML
INJECTION INFILTRATION; PERINEURAL
Status: DISCONTINUED | OUTPATIENT
Start: 2023-10-17 | End: 2023-10-17 | Stop reason: HOSPADM

## 2023-10-17 RX ORDER — SODIUM CHLORIDE, SODIUM LACTATE, POTASSIUM CHLORIDE, CALCIUM CHLORIDE 600; 310; 30; 20 MG/100ML; MG/100ML; MG/100ML; MG/100ML
INJECTION, SOLUTION INTRAVENOUS CONTINUOUS
Status: DISCONTINUED | OUTPATIENT
Start: 2023-10-17 | End: 2023-10-17 | Stop reason: HOSPADM

## 2023-10-17 RX ORDER — MIDAZOLAM HYDROCHLORIDE 5 MG/ML
INJECTION INTRAMUSCULAR; INTRAVENOUS
Status: DISCONTINUED | OUTPATIENT
Start: 2023-10-17 | End: 2023-10-17 | Stop reason: HOSPADM

## 2023-10-17 RX ORDER — LIDOCAINE HYDROCHLORIDE 40 MG/ML
INJECTION, SOLUTION RETROBULBAR
Status: DISCONTINUED | OUTPATIENT
Start: 2023-10-17 | End: 2023-10-17 | Stop reason: HOSPADM

## 2023-10-17 RX ADMIN — SODIUM CHLORIDE, POTASSIUM CHLORIDE, SODIUM LACTATE AND CALCIUM CHLORIDE: 600; 310; 30; 20 INJECTION, SOLUTION INTRAVENOUS at 05:10

## 2023-10-17 NOTE — H&P
"FOLLOW UP NOTE:     CHIEF COMPLAINT: back pain    INTERVAL HISTORY OF PRESENT ILLNESS: Gregorio Guerra is a 68 y.o. male who presents for diagnostic L2-L3 discogram with 4% lidocaine only. The patient denies of any significant changes in his health since his last appointment. The patient also denies of any changes in the character of his pain since his last appointment.     ROS:  Review of Systems   Constitutional:  Negative for chills and fever.   HENT:  Negative for tinnitus.    Eyes:  Negative for visual disturbance.   Respiratory:  Negative for shortness of breath.    Cardiovascular:  Negative for chest pain.   Gastrointestinal:  Negative for nausea and vomiting.   Genitourinary:  Negative for difficulty urinating.   Musculoskeletal:  Positive for arthralgias and back pain.   Skin:  Negative for rash.   Allergic/Immunologic: Negative for immunocompromised state.   Neurological:  Negative for syncope.   Hematological:  Does not bruise/bleed easily.   Psychiatric/Behavioral:  Negative for suicidal ideas.         MEDICAL, SURGICAL, FAMILY, SOCIAL HX: reviewed    MEDICATIONS/ALLERGIES: reviewed    PHYSICAL EXAM:    VITALS: Vitals reviewed.   Vitals:    10/17/23 0803 10/17/23 0804   BP: 131/85 131/85   Pulse: 77    Resp: 16    Temp: 97.9 °F (36.6 °C)    TempSrc: Oral    SpO2: 96%    Weight: 117.9 kg (260 lb)    Height: 6' 1" (1.854 m)        Physical Exam  Vitals and nursing note reviewed.   Constitutional:       Appearance: Normal appearance. He is not toxic-appearing or diaphoretic.   HENT:      Head: Normocephalic and atraumatic.   Eyes:      General:         Right eye: No discharge.         Left eye: No discharge.      Extraocular Movements: Extraocular movements intact.      Conjunctiva/sclera: Conjunctivae normal.   Cardiovascular:      Rate and Rhythm: Normal rate.   Pulmonary:      Effort: Pulmonary effort is normal. No respiratory distress.      Breath sounds: Normal breath sounds.   Abdominal:      " Palpations: Abdomen is soft.   Skin:     General: Skin is warm and dry.      Findings: No rash.   Neurological:      Mental Status: He is alert and oriented to person, place, and time.   Psychiatric:         Mood and Affect: Mood and affect normal.         Cognition and Memory: Memory normal.         Judgment: Judgment normal.          EXTREMITIES:    Gen: No cyanosis, edema, varicosities, or tenderness to palpation BLE   Skin: Warm, pink, dry, no rashes, no lesions BLE   Strength: 5/5 motor strength BLE   ROM: hips, knees and ankles without pain or instability.     NEUROLOGICAL:    Gen: No clonus or spasticity.   Gait: Normal without antalgic lean   DTR's: 2+ in bilateral patellar, and ankle   BABINSKI: Absent bilaterally  Sensory: Intact to light touch and proprioception BLE    ASSESSMENT: Gregorio Guerra is a 68 y.o. male who presents for diagnostic L2-L3 discogram with 4% lidocaine only.     PLAN:  Proceed with diagnostic L2-L3 discogram with 4% lidocaine only as previously discussed.    This patient has been cleared for surgery in an ambulatory surgical facility    ASA 3,  Mallampatti Score 3  No history of anesthetic complications  Plan for RN IV sedation    Lita Billings MD  Pain Management

## 2023-10-17 NOTE — PLAN OF CARE
Dr. Billings to bedside to speak to patient about procedure.  Patient is to call Dr. Billings's nurse to schedule actual procedure.

## 2023-10-17 NOTE — PLAN OF CARE
0923 Discharge instructions given to patient.  Patient verbalized understanding and voiced no concerns or questions at this time. Patient changing clothes for discharge.  Wife in recovery room as well with same procedure. Patient tolerating po fluids well.

## 2023-10-17 NOTE — OP NOTE
"Patient Name: Gregorio Guerra  MRN: [id number]    INFORMED CONSENT: The procedure, risks, benefits and options were discussed with patient. There are no contraindications to the procedure. The patient expressed understanding and agreed to proceed. The personnel performing the procedure was discussed. I verify that I personally obtained Gregorio's consent prior to the start of the procedure and the signed consent can be found on the patient's chart.      Procedure Date: 10/17/2023  Surgeon(s) and Role:     * Lita Billings MD - Primary  Anesthesia: RN IV Sedation  Procedure(s) (LRB):  LUMBAR DISCOGRAPHY DIAGNOSTIC L2-L3 DISCOGRAM WITH 4% LIDOCAINE ONLY (N/A)    Pre Procedure diagnosis: Discogenic lumbar pain [M54.59]  Post-Procedure diagnosis: Discogenic lumbar pain [M54.59]    Sedation: Yes - Midazolam 5 mg   Conscious sedation ordered by MD.  Patient reevaluated and sedation administered by MD and monitored by RN. (See nurse documentation and case log for sedation time)     PROCEDURE: DIAGNOSTIC DISCOGRAPHY  DESCRIPTION OF PROCEDURE: The patient was brought to the fluoroscopy suite.  IV access was obtained prior to the procedure. The patient was positioned prone on the fluoroscopy table.  Continuous hemodynamic monitoring was initiated including blood pressure, EKG, and pulse oximetry.  IV sedation was administered incrementally to allow the patient to remain comfortable and conversant throughout the procedure. The area of the thoracolumbar spine was prepped with chlorhexidine and chlorhexidine three times and draped in a sterile fashion. The targeted disc was identified by fluoroscopy and the skin and subcutaneous tissues overlying the needle insertion point was anesthetized using 5 cc of lidocaine 1%.  Under fluoroscopic guidance, an 18 G 3.5 inch introducer needle was inserted coaxially towards the L2-L3 disc space. Next, a 22 gauge 7" spinal needle was placed through the introducer needle and slowly advanced " into the L2-L3 disc. The position of the needles was confirmed using oblique, AP and lateral fluoroscopic imaging. Negative aspiration for blood was confirmed bilaterally. 1 mL Omnipaque 300 contrast was injected to confirm intradiscal placement.     Lidocaine 4% 1 cc was administered intradiscally for analgesia.  The needles were removed and bleeding was nil.  A sterile dressing was applied. No specimens collected. PATIENT was taken to the Post-block Recovery Area for further observation.   Blood Loss: Nill  Specimen: None    Pre Procedure Pain Level: 9/10  Post-Procedure Pain Level: 1/10

## 2023-10-26 ENCOUNTER — OFFICE VISIT (OUTPATIENT)
Dept: CARDIOLOGY | Facility: CLINIC | Age: 68
End: 2023-10-26
Payer: MEDICARE

## 2023-10-26 VITALS
HEIGHT: 73 IN | SYSTOLIC BLOOD PRESSURE: 108 MMHG | WEIGHT: 263.19 LBS | BODY MASS INDEX: 34.88 KG/M2 | HEART RATE: 62 BPM | DIASTOLIC BLOOD PRESSURE: 70 MMHG | OXYGEN SATURATION: 95 %

## 2023-10-26 DIAGNOSIS — F10.10 EXCESSIVE DRINKING ALCOHOL: ICD-10-CM

## 2023-10-26 DIAGNOSIS — E78.2 MIXED HYPERLIPIDEMIA: ICD-10-CM

## 2023-10-26 DIAGNOSIS — G47.19 EXCESSIVE DAYTIME SLEEPINESS: ICD-10-CM

## 2023-10-26 DIAGNOSIS — R06.02 SOB (SHORTNESS OF BREATH): ICD-10-CM

## 2023-10-26 DIAGNOSIS — R94.39 ABNORMAL NUCLEAR STRESS TEST: ICD-10-CM

## 2023-10-26 DIAGNOSIS — I10 PRIMARY HYPERTENSION: ICD-10-CM

## 2023-10-26 DIAGNOSIS — R06.83 SNORING: ICD-10-CM

## 2023-10-26 DIAGNOSIS — I25.10 CAD IN NATIVE ARTERY: ICD-10-CM

## 2023-10-26 DIAGNOSIS — I50.32 CHRONIC HEART FAILURE WITH PRESERVED EJECTION FRACTION: ICD-10-CM

## 2023-10-26 DIAGNOSIS — E65 ABDOMINAL OBESITY: ICD-10-CM

## 2023-10-26 DIAGNOSIS — F17.200 SMOKER: ICD-10-CM

## 2023-10-26 DIAGNOSIS — I48.0 PAROXYSMAL A-FIB: Primary | ICD-10-CM

## 2023-10-26 DIAGNOSIS — F11.20 UNCOMPLICATED OPIOID DEPENDENCE: ICD-10-CM

## 2023-10-26 DIAGNOSIS — R53.82 CHRONIC FATIGUE: ICD-10-CM

## 2023-10-26 PROCEDURE — 3078F DIAST BP <80 MM HG: CPT | Mod: CPTII,S$GLB,, | Performed by: INTERNAL MEDICINE

## 2023-10-26 PROCEDURE — 93000 ELECTROCARDIOGRAM COMPLETE: CPT | Mod: S$GLB,,, | Performed by: INTERNAL MEDICINE

## 2023-10-26 PROCEDURE — 99999 PR PBB SHADOW E&M-EST. PATIENT-LVL V: ICD-10-PCS | Mod: PBBFAC,,, | Performed by: INTERNAL MEDICINE

## 2023-10-26 PROCEDURE — 1101F PT FALLS ASSESS-DOCD LE1/YR: CPT | Mod: CPTII,S$GLB,, | Performed by: INTERNAL MEDICINE

## 2023-10-26 PROCEDURE — 1101F PR PT FALLS ASSESS DOC 0-1 FALLS W/OUT INJ PAST YR: ICD-10-PCS | Mod: CPTII,S$GLB,, | Performed by: INTERNAL MEDICINE

## 2023-10-26 PROCEDURE — 99215 OFFICE O/P EST HI 40 MIN: CPT | Mod: 25,S$GLB,, | Performed by: INTERNAL MEDICINE

## 2023-10-26 PROCEDURE — 3008F PR BODY MASS INDEX (BMI) DOCUMENTED: ICD-10-PCS | Mod: CPTII,S$GLB,, | Performed by: INTERNAL MEDICINE

## 2023-10-26 PROCEDURE — 99215 PR OFFICE/OUTPT VISIT, EST, LEVL V, 40-54 MIN: ICD-10-PCS | Mod: 25,S$GLB,, | Performed by: INTERNAL MEDICINE

## 2023-10-26 PROCEDURE — 3008F BODY MASS INDEX DOCD: CPT | Mod: CPTII,S$GLB,, | Performed by: INTERNAL MEDICINE

## 2023-10-26 PROCEDURE — 99999 PR PBB SHADOW E&M-EST. PATIENT-LVL V: CPT | Mod: PBBFAC,,, | Performed by: INTERNAL MEDICINE

## 2023-10-26 PROCEDURE — 3074F SYST BP LT 130 MM HG: CPT | Mod: CPTII,S$GLB,, | Performed by: INTERNAL MEDICINE

## 2023-10-26 PROCEDURE — 1159F MED LIST DOCD IN RCRD: CPT | Mod: CPTII,S$GLB,, | Performed by: INTERNAL MEDICINE

## 2023-10-26 PROCEDURE — 3074F PR MOST RECENT SYSTOLIC BLOOD PRESSURE < 130 MM HG: ICD-10-PCS | Mod: CPTII,S$GLB,, | Performed by: INTERNAL MEDICINE

## 2023-10-26 PROCEDURE — 3288F FALL RISK ASSESSMENT DOCD: CPT | Mod: CPTII,S$GLB,, | Performed by: INTERNAL MEDICINE

## 2023-10-26 PROCEDURE — 93000 EKG 12-LEAD: ICD-10-PCS | Mod: S$GLB,,, | Performed by: INTERNAL MEDICINE

## 2023-10-26 PROCEDURE — 4010F PR ACE/ARB THEARPY RXD/TAKEN: ICD-10-PCS | Mod: CPTII,S$GLB,, | Performed by: INTERNAL MEDICINE

## 2023-10-26 PROCEDURE — 4010F ACE/ARB THERAPY RXD/TAKEN: CPT | Mod: CPTII,S$GLB,, | Performed by: INTERNAL MEDICINE

## 2023-10-26 PROCEDURE — 3078F PR MOST RECENT DIASTOLIC BLOOD PRESSURE < 80 MM HG: ICD-10-PCS | Mod: CPTII,S$GLB,, | Performed by: INTERNAL MEDICINE

## 2023-10-26 PROCEDURE — 1159F PR MEDICATION LIST DOCUMENTED IN MEDICAL RECORD: ICD-10-PCS | Mod: CPTII,S$GLB,, | Performed by: INTERNAL MEDICINE

## 2023-10-26 PROCEDURE — 3288F PR FALLS RISK ASSESSMENT DOCUMENTED: ICD-10-PCS | Mod: CPTII,S$GLB,, | Performed by: INTERNAL MEDICINE

## 2023-10-26 RX ORDER — OXYCODONE AND ACETAMINOPHEN 10; 325 MG/1; MG/1
1 TABLET ORAL
COMMUNITY
Start: 2023-09-28

## 2023-10-26 RX ORDER — GABAPENTIN 300 MG/1
CAPSULE ORAL
COMMUNITY
Start: 2023-06-21

## 2023-10-26 RX ORDER — OXYCODONE AND ACETAMINOPHEN 7.5; 325 MG/1; MG/1
1 TABLET ORAL
COMMUNITY
Start: 2023-08-31

## 2023-10-26 NOTE — PROGRESS NOTES
"Subjective:    Patient ID:  Gregorio Guerra is a 68 y.o. male who presents for evaluation of Establish Care  Came on own for PAF  PCP: none  Prior cardiologist: Leonel Velasco MD , last seen 2/2023  Pain: Lita Billings MD   Lives with wife, Scar, outdoor smoker  Retired [employer],     Patient is a new patient to me.     Social Determinate of Health: Do you have any problem with the following: none  Health Literacy (understanding): high  Vaccination: up to date yes, covid vaccine yes, covid infection 2022, no lasting effects  Activities: no working out, fatigue all the time for the past 2 years.  Nicotine: smoker  (1 pp week x 50 yrs), started age 13  Alcohol: 80 proof liquor, max 4 shots in any 24 hours, 3 times weekly.  Illicit Drugs: now and then THC gummies, Rx opioid for over 2 years.  Cardiac Symptoms: fatigue, was told he had afib  Home BP: no  Medication Compliance: yes, have skipped Eliquis  Diet: Regular, use salt   Caffeine: Coffee 1/2 cup and a Coke per day  Labs: No results found for: "TSH"   No results found for: "LABA1C", "HGBA1C"    Lab Results   Component Value Date    WBC 6.93 06/16/2021    HGB 14.7 06/16/2021    HCT 42.5 06/16/2021     (H) 06/16/2021     (L) 06/16/2021       CMP  Sodium   Date Value Ref Range Status   06/16/2021 137 136 - 145 mmol/L Final   06/15/2021 136 136 - 145 mmol/L Final   06/14/2021 133 (L) 136 - 145 mmol/L Final   10/17/2018 138 134 - 144 mmol/L      Potassium   Date Value Ref Range Status   06/16/2021 3.9 3.5 - 5.1 mmol/L Final   06/15/2021 3.8 3.5 - 5.1 mmol/L Final   06/14/2021 4.7 3.5 - 5.1 mmol/L Final     Chloride   Date Value Ref Range Status   06/16/2021 103 95 - 110 mmol/L Final   06/15/2021 104 95 - 110 mmol/L Final   06/14/2021 95 95 - 110 mmol/L Final   10/17/2018 105 98 - 110 mmol/L      CO2   Date Value Ref Range Status   06/16/2021 25 23 - 29 mmol/L Final   06/15/2021 23 23 - 29 mmol/L Final   06/14/2021 " 27 23 - 29 mmol/L Final     Glucose   Date Value Ref Range Status   06/16/2021 108 70 - 110 mg/dL Final   06/15/2021 119 (H) 70 - 110 mg/dL Final   06/14/2021 120 (H) 70 - 110 mg/dL Final   10/17/2018 98 70 - 99 mg/dL      BUN   Date Value Ref Range Status   06/16/2021 11 8 - 23 mg/dL Final   06/15/2021 14 8 - 23 mg/dL Final   06/14/2021 15 8 - 23 mg/dL Final     Creatinine   Date Value Ref Range Status   06/16/2021 0.9 0.5 - 1.4 mg/dL Final   06/15/2021 0.9 0.5 - 1.4 mg/dL Final   06/14/2021 1.0 0.5 - 1.4 mg/dL Final   10/17/2018 1.06 0.60 - 1.40 mg/dL      Calcium   Date Value Ref Range Status   06/16/2021 8.5 (L) 8.7 - 10.5 mg/dL Final   06/15/2021 8.3 (L) 8.7 - 10.5 mg/dL Final   06/14/2021 9.1 8.7 - 10.5 mg/dL Final     Total Protein   Date Value Ref Range Status   06/16/2021 6.6 6.0 - 8.4 g/dL Final   06/15/2021 6.4 6.0 - 8.4 g/dL Final   06/14/2021 7.6 6.0 - 8.4 g/dL Final     Albumin   Date Value Ref Range Status   06/16/2021 3.5 3.5 - 5.2 g/dL Final   06/15/2021 3.3 (L) 3.5 - 5.2 g/dL Final   06/14/2021 4.0 3.5 - 5.2 g/dL Final   10/17/2018 4.2 3.1 - 4.7 g/dL      Total Bilirubin   Date Value Ref Range Status   06/16/2021 1.1 (H) 0.1 - 1.0 mg/dL Final     Comment:     For infants and newborns, interpretation of results should be based  on gestational age, weight and in agreement with clinical  observations.    Premature Infant recommended reference ranges:  Up to 24 hours.............<8.0 mg/dL  Up to 48 hours............<12.0 mg/dL  3-5 days..................<15.0 mg/dL  6-29 days.................<15.0 mg/dL     06/15/2021 1.6 (H) 0.1 - 1.0 mg/dL Final     Comment:     For infants and newborns, interpretation of results should be based  on gestational age, weight and in agreement with clinical  observations.    Premature Infant recommended reference ranges:  Up to 24 hours.............<8.0 mg/dL  Up to 48 hours............<12.0 mg/dL  3-5 days..................<15.0 mg/dL  6-29 days.................<15.0  "mg/dL     06/14/2021 2.0 (H) 0.1 - 1.0 mg/dL Final     Comment:     For infants and newborns, interpretation of results should be based  on gestational age, weight and in agreement with clinical  observations.    Premature Infant recommended reference ranges:  Up to 24 hours.............<8.0 mg/dL  Up to 48 hours............<12.0 mg/dL  3-5 days..................<15.0 mg/dL  6-29 days.................<15.0 mg/dL       Alkaline Phosphatase   Date Value Ref Range Status   06/16/2021 70 55 - 135 U/L Final   06/15/2021 69 55 - 135 U/L Final   06/14/2021 85 55 - 135 U/L Final     AST   Date Value Ref Range Status   06/16/2021 22 10 - 40 U/L Final   06/15/2021 23 10 - 40 U/L Final   06/14/2021 32 10 - 40 U/L Final     ALT   Date Value Ref Range Status   06/16/2021 32 10 - 44 U/L Final   06/15/2021 33 10 - 44 U/L Final   06/14/2021 45 (H) 10 - 44 U/L Final     Anion Gap   Date Value Ref Range Status   06/16/2021 9 8 - 16 mmol/L Final   06/15/2021 9 8 - 16 mmol/L Final   06/14/2021 11 8 - 16 mmol/L Final     @labrcntip(troponini)@  No results found for: "BNP"}   Lab Results   Component Value Date    CHOL 174 08/16/2005     Lab Results   Component Value Date    HDL 52.0 08/16/2005     Lab Results   Component Value Date    LDLCALC 106.2 08/16/2005     Lab Results   Component Value Date    TRIG 79 08/16/2005     Lab Results   Component Value Date    CHOLHDL 29.9 08/16/2005      Last DCC: 2019  Last Echo: 4/2021  Last stress test: Lexiscan 4/2021  Cardiovascular angiogram: 2018, "no blockages"  ECG: AF, rate 99, low voltage, IRBBB, QTc 497 msec, old record ECG in 1/2023 showing NSR with PAC  Fundoscopic exam: due    WM, came on own to establish CV care for PAF with chronic fatigue. Does not feel the AF and active smoking and heavy alcohol use due to chronic pains. Anticipating back procedure on 11/6/2023. Very limited with CLBP along with chronic SOB. Denies any CP had angiogram in 2018 and told of no blockages.    [facility] " "MD Krista noted 2/2023 "67 y.o. male is here for follow-up.       1. Chronic heart failure with preserved ejection fraction   2. Paroxysmal atrial fibrillation   3. CAD in native artery   4. Current every day smoker   5. Morbid obesity   6. Essential hypertension   7. Mixed hyperlipidemia     Echo 4/2021 - Interpretation Summary  · The left ventricle is normal in size with concentric remodeling and normal systolic function.  · The estimated ejection fraction is 60%.  · Normal left ventricular diastolic function.  · Normal right ventricular size with normal right ventricular systolic function.  · Mild left atrial enlargement.  · Mild mitral regurgitation.    Lexiscan - Interpretation Summary  ·  Abnormal myocardial perfusion scan.  ·  There is a moderate to severe intensity, moderate to large sized, mostly fixed with some reversibilty defect in the basal to distal inferior wall(s).  ·  There is a second small sized, mild to moderate intensity,  in the apex and anterobasilar wall(s).  ·  The gated perfusion images showed an ejection fraction of 44% post stress. Normal ejection fraction is greater than 53%.  ·  The EKG portion of this study is negative for ischemia.  ·  The patient reported no chest pain during the stress test.      Review of Systems   Constitutional: Positive for malaise/fatigue and weight gain. Negative for diaphoresis, fever and night sweats.   HENT:  Positive for stridor. Negative for nosebleeds and tinnitus.    Eyes:  Positive for blurred vision and redness. Negative for visual disturbance.   Cardiovascular:  Positive for dyspnea on exertion and palpitations. Negative for chest pain, claudication, cyanosis, irregular heartbeat, leg swelling, near-syncope, orthopnea and paroxysmal nocturnal dyspnea.   Respiratory:  Positive for shortness of breath, snoring and wheezing. Negative for cough and sleep disturbances due to breathing.         [place] score 9, awaken tired, never tested.    Endocrine: " "Negative for polydipsia and polyuria.   Hematologic/Lymphatic: Bruises/bleeds easily.   Skin:  Positive for itching. Negative for color change, flushing, nail changes, poor wound healing and suspicious lesions.   Musculoskeletal:  Positive for back pain and joint pain. Negative for arthritis, falls, gout, joint swelling, muscle cramps, muscle weakness and myalgias.   Gastrointestinal:  Positive for abdominal pain, constipation and nausea. Negative for heartburn, hematemesis, hematochezia and melena.   Neurological:  Negative for disturbances in coordination, excessive daytime sleepiness, dizziness, focal weakness, headaches, light-headedness, loss of balance, numbness, vertigo and weakness.   Psychiatric/Behavioral:  Negative for depression and substance abuse. The patient does not have insomnia and is not nervous/anxious.         Objective:    Physical Exam  Constitutional:       Appearance: He is well-developed.      Comments: RA O2 sat 95%   HENT:      Head: Normocephalic.   Eyes:      Conjunctiva/sclera: Conjunctivae normal.      Pupils: Pupils are equal, round, and reactive to light.   Neck:      Thyroid: No thyromegaly.      Vascular: No JVD.      Comments: Circumference 17.5"  Cardiovascular:      Rate and Rhythm: Normal rate. Rhythm irregularly irregular.      Pulses: Intact distal pulses.           Carotid pulses are 1+ on the right side and 1+ on the left side.       Radial pulses are 1+ on the right side and 1+ on the left side.        Dorsalis pedis pulses are 1+ on the right side and 1+ on the left side.        Posterior tibial pulses are 1+ on the right side and 1+ on the left side.      Heart sounds: Heart sounds are distant. No murmur heard.     No friction rub. No gallop.      Comments: Spider veins bilaterally  Pulmonary:      Effort: Pulmonary effort is normal.      Breath sounds: Normal breath sounds. No rales.   Chest:      Chest wall: No tenderness.   Abdominal:      General: Bowel sounds are " "normal.      Palpations: Abdomen is soft.      Tenderness: There is no abdominal tenderness.      Comments: Waist 48"   Musculoskeletal:         General: Normal range of motion.      Cervical back: Normal range of motion and neck supple.      Right lower leg: No edema.      Left lower leg: No edema.   Lymphadenopathy:      Cervical: No cervical adenopathy.   Skin:     General: Skin is warm and dry.      Findings: No rash.   Neurological:      Mental Status: He is alert and oriented to person, place, and time.           Assessment:       1. Paroxysmal A-fib    2. Chronic heart failure with preserved ejection fraction    3. Abnormal nuclear stress test    4. Chronic fatigue    5. Smoker, started age 13, 50+ year, max 1 pack per day, pack per week for past 3 years.    6. Excessive drinking alcohol    7. Uncomplicated opioid dependence, oxycodone 2-3 times daily for over 2 years    8. Primary hypertension, dx 2015    9. CAD in native artery    10. Mixed hyperlipidemia    11. SOB (shortness of breath)    12. Excessive daytime sleepiness    13. Snoring    14. Abdominal obesity         Plan:       Gregorio was seen today for establish care.    Diagnoses and all orders for this visit:    Paroxysmal A-fib  -     IN OFFICE EKG 12-LEAD (to Muse)  -     Holter monitor - 48 hour; Future  -     Echo; Future  -     Polysomnogram (CPAP will be added if patient meets diagnostic criteria.); Future    Chronic heart failure with preserved ejection fraction  -     Magnesium  -     Lipid Panel  -     Comprehensive Metabolic Panel  -     CBC Without Differential  -     Echo; Future  -     Polysomnogram (CPAP will be added if patient meets diagnostic criteria.); Future    Abnormal nuclear stress test    Chronic fatigue  -     Polysomnogram (CPAP will be added if patient meets diagnostic criteria.); Future    Smoker, started age 13, 50+ year, max 1 pack per day, pack per week for past 3 years.  -     Ambulatory referral/consult to Smoking " "Cessation Program; Future    Excessive drinking alcohol    Uncomplicated opioid dependence, oxycodone 2-3 times daily for over 2 years    Primary hypertension, dx 2015    CAD in native artery  -     Magnesium  -     Lipid Panel  -     Comprehensive Metabolic Panel  -     CBC Without Differential    Mixed hyperlipidemia  -     Magnesium  -     Lipid Panel  -     Comprehensive Metabolic Panel  -     CBC Without Differential    SOB (shortness of breath)  -     Echo; Future  -     Ambulatory referral/consult to Smoking Cessation Program; Future    Excessive daytime sleepiness  -     Polysomnogram (CPAP will be added if patient meets diagnostic criteria.); Future    Snoring  -     Polysomnogram (CPAP will be added if patient meets diagnostic criteria.); Future    Abdominal obesity  -     Polysomnogram (CPAP will be added if patient meets diagnostic criteria.); Future     - All medical issues reviewed, continue current Rx.  - Warning signs of MI and stroke given, if symptoms last more than 5 minutes, stop immediately and call 911, then chew 2-4 low-dose ASA (81 mg).   - Refer to "Chronic Pain" article in Consumer Report 6/2019 issue.   - Need annual fundoscopic examination   - Info on REED and cardiac ablations.  - CV status and all medications reviewed, patient acknowledge good understanding.  - Recommend healthy living: no nicotine, moderate alcohol, healthy diet and regular exercise aiming for fitness, restorative sleep and weight control  - Discussed healthy daily limit of 1 oz of pure alcohol in any 24 hours (roughly 2 12-oz beers, 10 oz of wine (8%-12% alcohol), or 1.5 oz of liquor (80 proof)), can not save up.   - Need good exercise program, 4 key elements: 1. Aerobic (walking, swimming, dancing, jogging, biking, etc, 2. Muscle strengthening / resistance exercise, need to do 2-3 times weekly, 3. Stretching daily, good stretch takes a whole  total minute. 4. Balance exercise daily.   - Encourage activities as much as " tolerated. Any activity is better than none!   - Instruction for Mediterranean diet and heart healthy exercise given.  - Weigh twice weekly, try to lose 1-2 lbs per week. Target weight loss of 5%-10%.  - Highly recommend 30-60 minutes of exercise / activities daily, can have Sunday off, with 2-3 sessions of muscle strengthening weekly. A  would be very helpful.  - Will follow up in 4 weeks to check efficacy, consider DCC  - Phone review / encourage use of [facility]       Total time spend including review of record prior to face-to-face visit is 60 minutes. Greater than 50% of the time was spent in counseling and coordination of care. The above assessment and plan have been discussed at length. Referring provider's note reviewed. Labs and procedure over the last 6 months reviewed. Problem List updated. Asked to bring in all active medications / pills bottles with next visit. Will send note to referring / PCP.

## 2023-10-26 NOTE — PATIENT INSTRUCTIONS
Recommended Mediterranean dietEating Heart-Healthy Food: Using the DASH Plan  Eating for your heart doesnt have to be hard or boring. You just need to know how to make healthier choices. The DASH eating plan has been developed to help you do just that. DASH stands for Dietary Approaches to Stop Hypertension. It is a plan that has been proven to be healthier for your heart and to lower your risk for high blood pressure. It can also help lower your risk for cancer, heart disease, osteoporosis, and diabetes.  Choosing from Each Food Group  Choose foods from each of the food groups below each day. Try to get the recommended number of servings for each food group. The serving numbers are based on a diet of 2,000 calories a day. Talk to your doctor if youre unsure about your calorie needs.  Grains   Servings: 7-8 a day  A serving is:  1 slice bread  1 ounce dry cereal  half a cup cooked rice or pasta  Best choices: Whole grains and any grains high in fiber.  Vegetables   Servings: 4-5 a day  A serving is:  1 cup raw leafy vegetable  Half a cup cooked vegetable  Three-quarter cup vegetable juice  Best choices: Fresh or frozen vegetable prepared without too much added salt or fat.    Fruits   Servings: 4-5 a day  A serving is:  Three-quarter cup fruit juice  1 medium fruit  One-quarter cup dried fruit  One-half cup fresh, frozen, or canned fruit  Best choices: A variety of fresh fruits of different colors. Whole fruits are a much better choice than fruit juices.  Low-fat or Fat Free Dairy   Servings: 2-3 a day  A serving is:  8 ounces milk  1 cup yogurt  One and a half ounces cheese  Best choices: Skim or 1% milk, low-fat or fat free yogurt or buttermilk, and low-fat cheeses.       Meat, Poultry, Fish   Servings: 2 or fewer a day  A serving is:  3 ounces cooked meat, poultry, or fish  Best choices: Lean meats and fish. Trim away visible fat. Broil, roast, or boil instead of frying. Remove skin from poultry before eating.   Nuts, Seeds, Beans   Servings: 4-5 a week  A serving is:  One third cup nuts (or one and a half ounces)  2 tablespoons sunflower seeds  Half a cup cooked beans  Best choices: Dry roasted nuts with no salt added, lentils, kidney beans, garbanzo beans, and whole alva beans.    Fats and Oils   Servings: 2 a day  A serving is:  1 teaspoon vegetable oil  1 teaspoon soft margarine  1 tablespoon low-fat mayonnaise  1 teaspoon regular mayonnaise  2 tablespoons light salad dressing  1 tablespoon regular salad dressing  Best choices: Monounsaturated and polyunsaturated fats such as olive, canola, or safflower oil.  Sweets   Servings: 5 a week or fewer  A serving is:  1 tablespoon sugar, maple syrup, or honey  1 tablespoon jam or jelly  1 half-ounce jelly beans (about 15)  8 ounces lemonade  Best choices: Dried fruit can be a satisfying sweet. Choose low-fat sweets when possible. And watch your serving sizes!       Aerobic Exercise for a Healthy Heart  Exercise is a lot more than an energy booster and a stress reliever. It also strengthens your heart muscle, lowers your blood pressure and blood cholesterol, and burns calories.      Remember, some activity is better than none.     Choose an Aerobic Activity  Choose a nonstop activity that makes your heart and lungs work harder than they do when you rest or walk normally. This aerobic exercise can improve the way your heart and other muscles use oxygen. Make it fun by exercising with a friend and choosing an activity you enjoy. Here are some ideas:  Walking  Swimming  Bicycling  Stair climbing  Dancing  Jogging  Exercise Regularly  If you havent been exercising regularly,  get your doctors okay first. Then start slowly.  Here are some tips:  Begin exercising 3 times a week for 5-10 minutes at a time.  When you feel comfortable, add a few minutes each week.  Slowly build up to exercising 3-4 times each week for 20-40 minutes. Aim for a total of 150 or more minutes a  week.  Be sure to carry your nitroglycerin with you when you exercise.  If you get angina when youre exercising, stop what youre doing, take your nitroglycerin, and call your doctor.  © 7328-2786 [employer], [address] [place] [address], [place] [address]. All rights reserved. This information is not intended as a substitute for professional medical care. Always follow your healthcare professional's instructions.    Losing Weight (Cardiovascular)  Excess weight is a major risk factor for heart disease. Losing weight may help keep your arteries open so that your heart can get the oxygen-rich blood it needs. Weight loss can also help lower your blood pressure and reduce your risk for diabetes. All in all, losing weight makes you healthier.          Exercise with a friend. When activity is fun, you're more likely to stick with it.        Calories and Weight Loss  Calories are the fuel your body burns for energy. You get the calories you need from the food you eat. For healthy weight loss, women should eat at least 1,200 calories a day, men at least 1,500.    When you eat more calories than you need, your body stores the extra calories as fat. One pound of fat equals 3,500 calories.    To lose weight, try to burn 500 calories a day more than you eat. To do this, eat 250 calories less each day. Add activity to burn the other 250 calories. Walking 21/2 miles burns about 250 calories.    Eat a variety of healthy foods. Its the best way to make calories count.     Tips for losing weight:  Drink 8 to 10 glasses of water a day.    Dont skip meals. Instead, eat smaller portions.       Brisk Activity Is Best  Brisk activity gets your heart pumping faster. It makes your heart healthier. Its also the best way to burn calories. In fact, your body may keep burning calories for hours after you stop a brisk activity.    Begin by walking 10 minutes most days.    Add more time and speed to your walk. Build up as you feel  able.    Try to walk briskly at least 30 minutes most days. If needed, you can break this into 2 shorter sessions.     Check off the ideas below that you could try to make your day more active:    Take the stairs instead of the elevator.    Park your car farther away and walk.    Ride a bike to work or to the store.    Walk laps around the mall.

## 2023-11-01 ENCOUNTER — TELEPHONE (OUTPATIENT)
Dept: CARDIOLOGY | Facility: CLINIC | Age: 68
End: 2023-11-01
Payer: MEDICARE

## 2023-11-01 NOTE — TELEPHONE ENCOUNTER
Returned call. Pt is going to go to the [place] location. They just wanted to let us know.     ----- Message from Phuong Ray sent at 11/1/2023  2:26 PM CDT -----  Contact: Gisella  Type:  Needs Medical Advice    Who Called: Gisella Gary     Would the patient rather a call back or a response via [facility]? Call     Best Call Back Number: [phone]    Additional Information: Gisella Gary would like to speak with the nurse reyes to getting a referral. Patient would like to go to that location.     Please call to advise

## 2023-11-09 ENCOUNTER — HOSPITAL ENCOUNTER (OUTPATIENT)
Dept: CARDIOLOGY | Facility: HOSPITAL | Age: 68
Discharge: HOME | End: 2023-11-09
Attending: INTERNAL MEDICINE
Payer: MEDICARE

## 2023-11-09 VITALS — HEIGHT: 73 IN | BODY MASS INDEX: 34.85 KG/M2 | WEIGHT: 263 LBS

## 2023-11-09 DIAGNOSIS — I48.0 PAROXYSMAL A-FIB: ICD-10-CM

## 2023-11-09 DIAGNOSIS — I50.32 CHRONIC HEART FAILURE WITH PRESERVED EJECTION FRACTION: ICD-10-CM

## 2023-11-09 DIAGNOSIS — R06.02 SOB (SHORTNESS OF BREATH): ICD-10-CM

## 2023-11-09 LAB
AORTIC ROOT ANNULUS: 3.45 CM
AORTIC VALVE CUSP SEPERATION: 2.06 CM
ASCENDING AORTA: 3.18 CM
AV INDEX (PROSTH): 0.86
AV MEAN GRADIENT: 2 MMHG
AV PEAK GRADIENT: 3 MMHG
AV VALVE AREA BY VELOCITY RATIO: 3.41 CM²
AV VALVE AREA: 3.31 CM²
AV VELOCITY RATIO: 0.89
BSA FOR ECHO PROCEDURE: 2.48 M2
CV ECHO LV RWT: 0.49 CM
DOP CALC AO PEAK VEL: 0.9 M/S
DOP CALC AO VTI: 15.5 CM
DOP CALC LVOT AREA: 3.8 CM2
DOP CALC LVOT DIAMETER: 2.21 CM
DOP CALC LVOT PEAK VEL: 0.8 M/S
DOP CALC LVOT STROKE VOLUME: 51.38 CM3
DOP CALC MV VTI: 19.4 CM
DOP CALCLVOT PEAK VEL VTI: 13.4 CM
E WAVE DECELERATION TIME: 196.84 MSEC
E/A RATIO: 88
E/E' RATIO: 8.8 M/S
ECHO LV POSTERIOR WALL: 1.24 CM (ref 0.6–1.1)
EJECTION FRACTION: 51 %
FRACTIONAL SHORTENING: 26 % (ref 28–44)
GLOBAL LONGITUIDAL STRAIN: -17 %
INTERVENTRICULAR SEPTUM: 1.19 CM (ref 0.6–1.1)
IVC DIAMETER: 1.64 CM
LA MAJOR: 5.1 CM
LA MINOR: 3.53 CM
LEFT ATRIUM SIZE: 4.62 CM
LEFT ATRIUM VOLUME INDEX MOD: 14.4 ML/M2
LEFT ATRIUM VOLUME MOD: 34.93 CM3
LEFT INTERNAL DIMENSION IN SYSTOLE: 3.74 CM (ref 2.1–4)
LEFT VENTRICLE DIASTOLIC VOLUME INDEX: 49.94 ML/M2
LEFT VENTRICLE DIASTOLIC VOLUME: 120.86 ML
LEFT VENTRICLE MASS INDEX: 100 G/M2
LEFT VENTRICLE SYSTOLIC VOLUME INDEX: 24.6 ML/M2
LEFT VENTRICLE SYSTOLIC VOLUME: 59.45 ML
LEFT VENTRICULAR INTERNAL DIMENSION IN DIASTOLE: 5.05 CM (ref 3.5–6)
LEFT VENTRICULAR MASS: 241.65 G
LV LATERAL E/E' RATIO: 8.8 M/S
LV SEPTAL E/E' RATIO: 8.8 M/S
LVOT MG: 1.35 MMHG
LVOT MV: 0.53 CM/S
MV MEAN GRADIENT: 2 MMHG
MV PEAK A VEL: 0.01 M/S
MV PEAK E VEL: 0.88 M/S
MV PEAK GRADIENT: 4 MMHG
MV STENOSIS PRESSURE HALF TIME: 57.08 MS
MV VALVE AREA BY CONTINUITY EQUATION: 2.65 CM2
MV VALVE AREA P 1/2 METHOD: 3.85 CM2
PISA MRMAX VEL: 2.83 M/S
PISA TR MAX VEL: 1.94 M/S
PV MV: 0.68 M/S
PV PEAK GRADIENT: 3 MMHG
PV PEAK VELOCITY: 0.86 M/S
RA MAJOR: 4.99 CM
RA PRESSURE ESTIMATED: 3 MMHG
RA WIDTH: 3.35 CM
RIGHT VENTRICULAR END-DIASTOLIC DIMENSION: 3.84 CM
RIGHT VENTRICULAR LENGTH IN DIASTOLE (APICAL 4-CHAMBER VIEW): 6.14 CM
RV MID DIAMA: 2.49 CM
RV TB RVSP: 5 MMHG
STJ: 3.19 CM
TDI LATERAL: 0.1 M/S
TDI SEPTAL: 0.1 M/S
TDI: 0.1 M/S
TR MAX PG: 15 MMHG
TRICUSPID ANNULAR PLANE SYSTOLIC EXCURSION: 1.9 CM
TRICUSPID VALVE PEAK A WAVE VELOCITY: 0.32 M/S
TV PEAK E VEL: 0.5 M/S
TV REST PULMONARY ARTERY PRESSURE: 18 MMHG
Z-SCORE OF LEFT VENTRICULAR DIMENSION IN END DIASTOLE: -8.18
Z-SCORE OF LEFT VENTRICULAR DIMENSION IN END SYSTOLE: -4.77

## 2023-11-09 PROCEDURE — 93226 XTRNL ECG REC<48 HR SCAN A/R: CPT

## 2023-11-09 PROCEDURE — 93306 ECHO (CUPID ONLY): ICD-10-PCS | Mod: 26,,, | Performed by: INTERNAL MEDICINE

## 2023-11-09 PROCEDURE — 93306 TTE W/DOPPLER COMPLETE: CPT | Mod: 26,,, | Performed by: INTERNAL MEDICINE

## 2023-11-09 PROCEDURE — 93227 HOLTER MONITOR - 48 HOUR (CUPID ONLY): ICD-10-PCS | Mod: ,,, | Performed by: INTERNAL MEDICINE

## 2023-11-09 PROCEDURE — 93356 ECHO (CUPID ONLY): ICD-10-PCS | Mod: ,,, | Performed by: INTERNAL MEDICINE

## 2023-11-09 PROCEDURE — 93356 MYOCRD STRAIN IMG SPCKL TRCK: CPT | Mod: ,,, | Performed by: INTERNAL MEDICINE

## 2023-11-09 PROCEDURE — 93227 XTRNL ECG REC<48 HR R&I: CPT | Mod: ,,, | Performed by: INTERNAL MEDICINE

## 2023-11-09 PROCEDURE — 93356 MYOCRD STRAIN IMG SPCKL TRCK: CPT

## 2023-11-16 ENCOUNTER — CLINICAL SUPPORT (OUTPATIENT)
Dept: SMOKING CESSATION | Facility: CLINIC | Age: 68
End: 2023-11-16

## 2023-11-16 DIAGNOSIS — F17.210 MODERATE SMOKER (20 OR LESS PER DAY): Primary | ICD-10-CM

## 2023-11-16 PROCEDURE — 99404 PR PREVENT COUNSEL,INDIV,60 MIN: ICD-10-PCS | Mod: S$GLB,,, | Performed by: NURSE PRACTITIONER

## 2023-11-16 PROCEDURE — 99404 PREV MED CNSL INDIV APPRX 60: CPT | Mod: S$GLB,,, | Performed by: NURSE PRACTITIONER

## 2023-11-16 RX ORDER — VARENICLINE TARTRATE 1 MG/1
1 TABLET, FILM COATED ORAL 2 TIMES DAILY
Qty: 56 TABLET | Refills: 0 | Status: SHIPPED | OUTPATIENT
Start: 2023-11-16 | End: 2023-12-14 | Stop reason: SDUPTHER

## 2023-11-16 NOTE — Clinical Note
Patient seen for the tobacco cessation program. This is his first attempt to stop smoking through our program. He is a cigarette smoker of 1/2 PPD X 56 years. He is motivated to quit the use of tobacco and has agreed to attend our 6 week tobacco cessation program.

## 2023-11-17 LAB
OHS CV EVENT MONITOR DAY: 0
OHS CV HOLTER LENGTH DECIMAL HOURS: 47.98
OHS CV HOLTER LENGTH HOURS: 47
OHS CV HOLTER LENGTH MINUTES: 59
OHS CV HOLTER SINUS AVERAGE HR: 97
OHS CV HOLTER SINUS MAX HR: 185
OHS CV HOLTER SINUS MIN HR: 40

## 2023-11-17 RX ORDER — SPIRONOLACTONE 25 MG/1
25 TABLET ORAL DAILY
Qty: 90 TABLET | Refills: 3 | Status: SHIPPED | OUTPATIENT
Start: 2023-11-17 | End: 2023-11-21 | Stop reason: SDUPTHER

## 2023-11-21 RX ORDER — SPIRONOLACTONE 25 MG/1
25 TABLET ORAL DAILY
Qty: 90 TABLET | Refills: 3 | Status: SHIPPED | OUTPATIENT
Start: 2023-11-21

## 2023-11-21 RX ORDER — METOPROLOL SUCCINATE 50 MG/1
50 TABLET, EXTENDED RELEASE ORAL DAILY
Qty: 90 TABLET | Refills: 3 | Status: SHIPPED | OUTPATIENT
Start: 2023-11-21

## 2023-11-29 ENCOUNTER — TELEPHONE (OUTPATIENT)
Dept: CARDIOLOGY | Facility: CLINIC | Age: 68
End: 2023-11-29
Payer: MEDICARE

## 2023-11-30 ENCOUNTER — CLINICAL SUPPORT (OUTPATIENT)
Dept: SMOKING CESSATION | Facility: CLINIC | Age: 68
End: 2023-11-30

## 2023-11-30 DIAGNOSIS — F17.210 LIGHT CIGARETTE SMOKER (1-9 CIGS/DAY): Primary | ICD-10-CM

## 2023-11-30 PROCEDURE — 99402 PREV MED CNSL INDIV APPRX 30: CPT | Mod: S$GLB,,, | Performed by: NURSE PRACTITIONER

## 2023-11-30 PROCEDURE — 99402 PR PREVENT COUNSEL,INDIV,30 MIN: ICD-10-PCS | Mod: S$GLB,,, | Performed by: NURSE PRACTITIONER

## 2023-11-30 NOTE — PROGRESS NOTES
Individual Follow-Up Form    11/30/2023    Quit Date: none    Clinical Status of Patient: Outpatient    Length of Service: 30 minutes    Continuing Medication: yes  Chantix    Other Medications: none     Target Symptoms: Withdrawal and medication side effects. The following were rated moderate (3) to severe (4) on TCRS:  Moderate (3): none  Severe (4): none    Comments: #1 Patient reports decreasing cigarette smoking from a 1/2 pack (10) per day for 56 years to 4 to 5 cigarettes per day. We discussed and reviewed strategies, cues, and triggers. Introduced the negative impact of tobacco on health, the health advantages of discontinuing the use of tobacco, time line improved health changes after a quit, withdrawal issues to expect from nicotine and habit, and ways to achieve the goal of a quit.The patient remains on the prescribed tobacco cessation medication regimen of 1 mg Chantix BID without any negative side effects at this time.The patient denies any abnormal behavioral or mental changes at this time. The patient will continue with group therapy sessions and medication monitoring by CTTS. Prescribed medication management will be by physician.     Diagnosis: F17.210    Next Visit: 1 week

## 2023-11-30 NOTE — Clinical Note
Patient reports decreasing cigarette smoking from a 1/2 pack (10) per day for 56 years to 4 to 5 cigarettes per day. We discussed and reviewed strategies, cues, and triggers. Introduced the negative impact of tobacco on health, the health advantages of discontinuing the use of tobacco, time line improved health changes after a quit, withdrawal issues to expect from nicotine and habit, and ways to achieve the goal of a quit.The patient remains on the prescribed tobacco cessation medication regimen of 1 mg Chantix BID without any negative side effects at this time.The patient denies any abnormal behavioral or mental changes at this time. The patient will continue with group therapy sessions and medication monitoring by CTTS. Prescribed medication management will be by physician.

## 2023-12-07 ENCOUNTER — CLINICAL SUPPORT (OUTPATIENT)
Dept: SMOKING CESSATION | Facility: CLINIC | Age: 68
End: 2023-12-07

## 2023-12-07 DIAGNOSIS — Z79.01 CURRENT USE OF LONG TERM ANTICOAGULATION: ICD-10-CM

## 2023-12-07 DIAGNOSIS — F17.210 LIGHT CIGARETTE SMOKER (1-9 CIGS/DAY): Primary | ICD-10-CM

## 2023-12-07 DIAGNOSIS — I48.0 PAROXYSMAL ATRIAL FIBRILLATION: Primary | ICD-10-CM

## 2023-12-07 DIAGNOSIS — I48.91 ATRIAL FIBRILLATION: ICD-10-CM

## 2023-12-07 PROCEDURE — 99402 PR PREVENT COUNSEL,INDIV,30 MIN: ICD-10-PCS | Mod: S$GLB,,, | Performed by: NURSE PRACTITIONER

## 2023-12-07 PROCEDURE — 99402 PREV MED CNSL INDIV APPRX 30: CPT | Mod: S$GLB,,, | Performed by: NURSE PRACTITIONER

## 2023-12-07 RX ORDER — SODIUM CHLORIDE 9 MG/ML
INJECTION, SOLUTION INTRAVENOUS CONTINUOUS
Status: CANCELLED | OUTPATIENT
Start: 2023-12-07

## 2023-12-07 NOTE — PROGRESS NOTES
Individual Follow-Up Form    12/7/2023    Quit Date: 12/5/23    Clinical Status of Patient: Outpatient    Length of Service: 30 minutes    Continuing Medication: yes  Chantix    Other Medications: none     Target Symptoms: Withdrawal and medication side effects. The following were rated moderate (3) to severe (4) on TCRS:  Moderate (3): none  Severe (4): none    Comments: #1 Patient reports being tobacco free since 12/5/23. He has a history of smoking a 1/2 pack of cigarettes per day for 56 years. He is pleased with his progress and intends to never smoke again.We discussed and reviewed strategies, cues, and triggers. Introduced the negative impact of tobacco on health, the health advantages of discontinuing the use of tobacco, time line improved health changes after a quit, withdrawal issues to expect from nicotine and habit, and ways to achieve the goal of a quit.The patient remains on the prescribed tobacco cessation medication regimen of 1 mg Chantix BID without any negative side effects at this time. The patient denies any abnormal behavioral or mental changes at this time.The patient will continue with group therapy sessions and medication monitoring by CTTS. Prescribed medication management will be by physician.     Diagnosis: F17.210    Next Visit: 1 week

## 2023-12-07 NOTE — Clinical Note
Patient reports being tobacco free since 12/5/23. He has a history of smoking a 1/2 pack of cigarettes per day for 56 years. He is pleased with his progress and intends to never smoke again.We discussed and reviewed strategies, cues, and triggers. Introduced the negative impact of tobacco on health, the health advantages of discontinuing the use of tobacco, time line improved health changes after a quit, withdrawal issues to expect from nicotine and habit, and ways to achieve the goal of a quit.The patient remains on the prescribed tobacco cessation medication regimen of 1 mg Chantix BID without any negative side effects at this time. The patient denies any abnormal behavioral or mental changes at this time.The patient will continue with group therapy sessions and medication monitoring by CTTS. Prescribed medication management will be by physician.

## 2023-12-08 ENCOUNTER — TELEPHONE (OUTPATIENT)
Dept: CARDIOLOGY | Facility: CLINIC | Age: 68
End: 2023-12-08
Payer: MEDICARE

## 2023-12-08 NOTE — TELEPHONE ENCOUNTER
Double checked that pt was aware of his appt. Let him know that someone will contact him with information on what to do. Also went over that he can not miss a single dose of eliquis.     ----- Message from Juanito He MD sent at 12/7/2023 12:01 PM CST -----  Regarding: RE: wants cardioversion  Contact: pt [phone]  Scheduled for 12/13/2023.    ----- Message -----  From: Viviane Shukla LPN  Sent: 12/6/2023   1:32 PM CST  To: Juanito He MD  Subject: RE: wants cardioversion                          Pt has confirmed twice that he has not missed a single dose and has been on for over 3 weeks. Please advise when he can be seen.     Thanks,     Viviane Shukla LPN  ----- Message -----  From: Juanito He MD  Sent: 12/6/2023   1:09 PM CST  To: Viviane Shukla LPN  Subject: RE: wants cardioversion                          Can do on any MWF, need to coordinate with Cardiology and anesthesia (on chronic hydrocodone Rx). Need to have uninterrupted Eliquis use twice daily for over 3 weeks. Thanks,    Dr. He     ----- Message -----  From: Viviane Shukla LPN  Sent: 12/6/2023  11:34 AM CST  To: Juanito He MD  Subject: wants cardioversion                              Told pt it would be up to you but he wants a cardioversion done asap. Please advise.     Thanks,     Viviane Shukla LPN  ----- Message -----  From: Carmen Rolle  Sent: 12/6/2023  10:43 AM CST  To: Neri BROCK Staff    Type:  Patient Returning Call    Who Called:  Pt   Who Left Message for Patient:  Viviane   Does the patient know what this is regarding?:  lab work/ f/u   Best Call Back Number:  [phone]    Additional Information:  Pt stated he missed a call today

## 2023-12-11 ENCOUNTER — ANESTHESIA EVENT (OUTPATIENT)
Dept: CARDIOLOGY | Facility: HOSPITAL | Age: 68
End: 2023-12-11
Payer: MEDICARE

## 2023-12-11 ENCOUNTER — TELEPHONE (OUTPATIENT)
Dept: CARDIOLOGY | Facility: HOSPITAL | Age: 68
End: 2023-12-11
Payer: MEDICARE

## 2023-12-11 NOTE — TELEPHONE ENCOUNTER
Called and spoke with patient to confirm appt date and time. Instructed to make sure transportation home from PACU. Is available and to not eat or drink anything after midnight before procedure.  Also confirmed eliquis taken consistently with no mssed doses. Pt verbalized understanding.

## 2023-12-11 NOTE — ANESTHESIA PREPROCEDURE EVALUATION
12/11/2023  Gregorio Guerra is a 68 y.o., male.      Pre-op Assessment    I have reviewed the Patient Summary Reports.     I have reviewed the Nursing Notes. I have reviewed the NPO Status.   I have reviewed the Medications.     Review of Systems  Anesthesia Hx:  No problems with previous Anesthesia   ,History Of Prior Surgeries: ex lap, THR, knee.          Denies Family Hx of Anesthesia complications.    Denies Personal Hx of Anesthesia complications.                    Social:  Smoker, Alcohol Use I ppd since age 13, now perhaps less      Hematology/Oncology:  Hematology Normal   Oncology Normal                Hematology Comments: Relative thrombycytopenia, chronic                    EENT/Dental:  EENT/Dental Normal           Cardiovascular:     Hypertension    Dysrhythmias atrial fibrillation  CHF    hyperlipidemia CALHOUN  ECG has been reviewed. ECHO 2023: EF 50-55%, no sig change    Holter: persistent AF  2021  Nuc. Stress: + defect, mostly fixed.  Neg for ischemia  2017 cath: NOD    SAL/cardioversion                         Pulmonary:      Shortness of breath   Chronic cough               Renal/:   renal calculi               Hepatic/GI:  Hepatic/GI Normal       AST 46          Musculoskeletal:  Arthritis   Admission for septic arthritis            Neurological:        Chronic Pain Syndrome                         Endocrine:  Endocrine Normal    BMI        Dermatological:  Skin Normal    Psych:  Psychiatric Normal                    Physical Exam  General: Well nourished, Cooperative, Alert and Oriented    Airway:  Mallampati: II   Mouth Opening: Normal  TM Distance: Normal  Tongue: Normal  Neck ROM: Normal ROM    Dental:  Intact    Chest/Lungs:  Clear to auscultation, Normal Respiratory Rate    Heart:  Rate: Normal        Anesthesia Plan  Type of Anesthesia, risks & benefits discussed:    Anesthesia  Type: Gen Natural Airway  Intra-op Monitoring Plan: Standard ASA Monitors  Post Op Pain Control Plan:   (medical reason for not using multimodal pain management)  Induction:  IV  Informed Consent: Informed consent signed with the Patient and all parties understand the risks and agree with anesthesia plan.  All questions answered.   ASA Score: 3  Day of Surgery Review of History & Physical: H&P Update referred to the surgeon/provider.    Ready For Surgery From Anesthesia Perspective.     .

## 2023-12-13 ENCOUNTER — HOSPITAL ENCOUNTER (OUTPATIENT)
Facility: HOSPITAL | Age: 68
Discharge: HOME | End: 2023-12-13
Attending: INTERNAL MEDICINE | Admitting: INTERNAL MEDICINE
Payer: MEDICARE

## 2023-12-13 ENCOUNTER — ANESTHESIA (OUTPATIENT)
Dept: CARDIOLOGY | Facility: HOSPITAL | Age: 68
End: 2023-12-13
Payer: MEDICARE

## 2023-12-13 VITALS
DIASTOLIC BLOOD PRESSURE: 87 MMHG | HEART RATE: 71 BPM | RESPIRATION RATE: 18 BRPM | OXYGEN SATURATION: 97 % | SYSTOLIC BLOOD PRESSURE: 134 MMHG

## 2023-12-13 DIAGNOSIS — I48.91 ATRIAL FIBRILLATION: ICD-10-CM

## 2023-12-13 DIAGNOSIS — I48.0 PAROXYSMAL ATRIAL FIBRILLATION: ICD-10-CM

## 2023-12-13 DIAGNOSIS — Z79.01 CURRENT USE OF LONG TERM ANTICOAGULATION: ICD-10-CM

## 2023-12-13 PROCEDURE — 93005 ELECTROCARDIOGRAM TRACING: CPT | Mod: 59

## 2023-12-13 PROCEDURE — 25000003 PHARM REV CODE 250: Performed by: INTERNAL MEDICINE

## 2023-12-13 PROCEDURE — 37000008 HC ANESTHESIA 1ST 15 MINUTES: Performed by: INTERNAL MEDICINE

## 2023-12-13 PROCEDURE — 63600175 PHARM REV CODE 636 W HCPCS

## 2023-12-13 PROCEDURE — D9220A PRA ANESTHESIA: Mod: CRNA,,, | Performed by: NURSE ANESTHETIST, CERTIFIED REGISTERED

## 2023-12-13 PROCEDURE — 63600175 PHARM REV CODE 636 W HCPCS: Performed by: NURSE ANESTHETIST, CERTIFIED REGISTERED

## 2023-12-13 PROCEDURE — 63600175 PHARM REV CODE 636 W HCPCS: Mod: JZ,JG | Performed by: INTERNAL MEDICINE

## 2023-12-13 PROCEDURE — 92960 CARDIOVERSION ELECTRIC EXT: CPT | Performed by: INTERNAL MEDICINE

## 2023-12-13 PROCEDURE — D9220A PRA ANESTHESIA: ICD-10-PCS | Mod: CRNA,,, | Performed by: NURSE ANESTHETIST, CERTIFIED REGISTERED

## 2023-12-13 PROCEDURE — 63600175 PHARM REV CODE 636 W HCPCS: Performed by: ANESTHESIOLOGY

## 2023-12-13 PROCEDURE — 93010 ELECTROCARDIOGRAM REPORT: CPT | Mod: 76,,, | Performed by: INTERNAL MEDICINE

## 2023-12-13 PROCEDURE — 37000009 HC ANESTHESIA EA ADD 15 MINS: Performed by: INTERNAL MEDICINE

## 2023-12-13 PROCEDURE — D9220A PRA ANESTHESIA: Mod: ANES,,, | Performed by: ANESTHESIOLOGY

## 2023-12-13 PROCEDURE — D9220A PRA ANESTHESIA: ICD-10-PCS | Mod: ANES,,, | Performed by: ANESTHESIOLOGY

## 2023-12-13 PROCEDURE — 93010 EKG 12-LEAD: ICD-10-PCS | Mod: ,,, | Performed by: INTERNAL MEDICINE

## 2023-12-13 RX ORDER — LIDOCAINE HYDROCHLORIDE 10 MG/ML
1 INJECTION, SOLUTION EPIDURAL; INFILTRATION; INTRACAUDAL; PERINEURAL ONCE
Status: CANCELLED | OUTPATIENT
Start: 2023-12-13 | End: 2023-12-13

## 2023-12-13 RX ORDER — PROPOFOL 10 MG/ML
VIAL (ML) INTRAVENOUS
Status: DISCONTINUED | OUTPATIENT
Start: 2023-12-13 | End: 2023-12-13

## 2023-12-13 RX ORDER — ONDANSETRON 2 MG/ML
4 INJECTION INTRAMUSCULAR; INTRAVENOUS DAILY PRN
Status: DISCONTINUED | OUTPATIENT
Start: 2023-12-13 | End: 2023-12-13 | Stop reason: HOSPADM

## 2023-12-13 RX ORDER — SODIUM CHLORIDE, SODIUM LACTATE, POTASSIUM CHLORIDE, CALCIUM CHLORIDE 600; 310; 30; 20 MG/100ML; MG/100ML; MG/100ML; MG/100ML
INJECTION, SOLUTION INTRAVENOUS CONTINUOUS
Status: CANCELLED | OUTPATIENT
Start: 2023-12-13

## 2023-12-13 RX ORDER — SODIUM CHLORIDE, SODIUM LACTATE, POTASSIUM CHLORIDE, CALCIUM CHLORIDE 600; 310; 30; 20 MG/100ML; MG/100ML; MG/100ML; MG/100ML
125 INJECTION, SOLUTION INTRAVENOUS CONTINUOUS
Status: DISCONTINUED | OUTPATIENT
Start: 2023-12-13 | End: 2023-12-13 | Stop reason: HOSPADM

## 2023-12-13 RX ORDER — SODIUM CHLORIDE 9 MG/ML
INJECTION, SOLUTION INTRAVENOUS CONTINUOUS
Status: DISCONTINUED | OUTPATIENT
Start: 2023-12-13 | End: 2023-12-13 | Stop reason: HOSPADM

## 2023-12-13 RX ADMIN — PROPOFOL 100 MG: 10 INJECTION, EMULSION INTRAVENOUS at 01:12

## 2023-12-13 RX ADMIN — SODIUM CHLORIDE 1 MG: 9 INJECTION, SOLUTION INTRAVENOUS at 11:12

## 2023-12-13 RX ADMIN — SODIUM CHLORIDE, POTASSIUM CHLORIDE, SODIUM LACTATE AND CALCIUM CHLORIDE: 600; 310; 30; 20 INJECTION, SOLUTION INTRAVENOUS at 12:12

## 2023-12-13 RX ADMIN — SODIUM CHLORIDE: 0.9 INJECTION, SOLUTION INTRAVENOUS at 10:12

## 2023-12-13 NOTE — NURSING
Pt did not convert with 2 doses of corvert.  Electrical cardioversion to be done with anestheisia .  SEDATION documentation on anesthesia flowsheet. Assessments per anesthesia during sedation.

## 2023-12-13 NOTE — H&P
"Patient ID:  Gregorio Guerra is a 68 y.o. male who presents for evaluation of Establish Care  Came on own for PAF  PCP: none  Prior cardiologist: Leonel Velasco MD , last seen 2/2023  Pain: Lita Billings MD   Lives with wife, Scar, outdoor smoker  Retired [employer],      Patient is a new patient to me.      Social Determinate of Health: Do you have any problem with the following: none  Health Literacy (understanding): high  Vaccination: up to date yes, covid vaccine yes, covid infection 2022, no lasting effects  Activities: no working out, fatigue all the time for the past 2 years.  Nicotine: smoker  (1 pp week x 50 yrs), started age 13  Alcohol: 80 proof liquor, max 4 shots in any 24 hours, 3 times weekly.  Illicit Drugs: now and then THC gummies, Rx opioid for over 2 years.  Cardiac Symptoms: fatigue, was told he had afib  Home BP: no  Medication Compliance: yes, have skipped Eliquis  Diet: Regular, use salt   Caffeine: Coffee 1/2 cup and a Coke per day  Labs: No results found for: "TSH"   No results found for: "LABA1C", "HGBA1C"          Lab Results   Component Value Date     WBC 6.93 06/16/2021     HGB 14.7 06/16/2021     HCT 42.5 06/16/2021      (H) 06/16/2021      (L) 06/16/2021        CMP        Sodium   Date Value Ref Range Status   06/16/2021 137 136 - 145 mmol/L Final   06/15/2021 136 136 - 145 mmol/L Final   06/14/2021 133 (L) 136 - 145 mmol/L Final   10/17/2018 138 134 - 144 mmol/L              Potassium   Date Value Ref Range Status   06/16/2021 3.9 3.5 - 5.1 mmol/L Final   06/15/2021 3.8 3.5 - 5.1 mmol/L Final   06/14/2021 4.7 3.5 - 5.1 mmol/L Final            Chloride   Date Value Ref Range Status   06/16/2021 103 95 - 110 mmol/L Final   06/15/2021 104 95 - 110 mmol/L Final   06/14/2021 95 95 - 110 mmol/L Final   10/17/2018 105 98 - 110 mmol/L              CO2   Date Value Ref Range Status   06/16/2021 25 23 - 29 mmol/L Final   06/15/2021 23 23 - " 29 mmol/L Final   06/14/2021 27 23 - 29 mmol/L Final            Glucose   Date Value Ref Range Status   06/16/2021 108 70 - 110 mg/dL Final   06/15/2021 119 (H) 70 - 110 mg/dL Final   06/14/2021 120 (H) 70 - 110 mg/dL Final   10/17/2018 98 70 - 99 mg/dL              BUN   Date Value Ref Range Status   06/16/2021 11 8 - 23 mg/dL Final   06/15/2021 14 8 - 23 mg/dL Final   06/14/2021 15 8 - 23 mg/dL Final            Creatinine   Date Value Ref Range Status   06/16/2021 0.9 0.5 - 1.4 mg/dL Final   06/15/2021 0.9 0.5 - 1.4 mg/dL Final   06/14/2021 1.0 0.5 - 1.4 mg/dL Final   10/17/2018 1.06 0.60 - 1.40 mg/dL              Calcium   Date Value Ref Range Status   06/16/2021 8.5 (L) 8.7 - 10.5 mg/dL Final   06/15/2021 8.3 (L) 8.7 - 10.5 mg/dL Final   06/14/2021 9.1 8.7 - 10.5 mg/dL Final            Total Protein   Date Value Ref Range Status   06/16/2021 6.6 6.0 - 8.4 g/dL Final   06/15/2021 6.4 6.0 - 8.4 g/dL Final   06/14/2021 7.6 6.0 - 8.4 g/dL Final            Albumin   Date Value Ref Range Status   06/16/2021 3.5 3.5 - 5.2 g/dL Final   06/15/2021 3.3 (L) 3.5 - 5.2 g/dL Final   06/14/2021 4.0 3.5 - 5.2 g/dL Final   10/17/2018 4.2 3.1 - 4.7 g/dL                Total Bilirubin   Date Value Ref Range Status   06/16/2021 1.1 (H) 0.1 - 1.0 mg/dL Final       Comment:       For infants and newborns, interpretation of results should be based  on gestational age, weight and in agreement with clinical  observations.     Premature Infant recommended reference ranges:  Up to 24 hours.............<8.0 mg/dL  Up to 48 hours............<12.0 mg/dL  3-5 days..................<15.0 mg/dL  6-29 days.................<15.0 mg/dL      06/15/2021 1.6 (H) 0.1 - 1.0 mg/dL Final       Comment:       For infants and newborns, interpretation of results should be based  on gestational age, weight and in agreement with clinical  observations.     Premature Infant recommended reference ranges:  Up to 24 hours.............<8.0 mg/dL  Up to 48  "hours............<12.0 mg/dL  3-5 days..................<15.0 mg/dL  6-29 days.................<15.0 mg/dL      06/14/2021 2.0 (H) 0.1 - 1.0 mg/dL Final       Comment:       For infants and newborns, interpretation of results should be based  on gestational age, weight and in agreement with clinical  observations.     Premature Infant recommended reference ranges:  Up to 24 hours.............<8.0 mg/dL  Up to 48 hours............<12.0 mg/dL  3-5 days..................<15.0 mg/dL  6-29 days.................<15.0 mg/dL               Alkaline Phosphatase   Date Value Ref Range Status   06/16/2021 70 55 - 135 U/L Final   06/15/2021 69 55 - 135 U/L Final   06/14/2021 85 55 - 135 U/L Final            AST   Date Value Ref Range Status   06/16/2021 22 10 - 40 U/L Final   06/15/2021 23 10 - 40 U/L Final   06/14/2021 32 10 - 40 U/L Final            ALT   Date Value Ref Range Status   06/16/2021 32 10 - 44 U/L Final   06/15/2021 33 10 - 44 U/L Final   06/14/2021 45 (H) 10 - 44 U/L Final            Anion Gap   Date Value Ref Range Status   06/16/2021 9 8 - 16 mmol/L Final   06/15/2021 9 8 - 16 mmol/L Final   06/14/2021 11 8 - 16 mmol/L Final     @labrcntip(troponini)@  No results found for: "BNP"}         Lab Results   Component Value Date     CHOL 174 08/16/2005            Lab Results   Component Value Date     HDL 52.0 08/16/2005            Lab Results   Component Value Date     LDLCALC 106.2 08/16/2005            Lab Results   Component Value Date     TRIG 79 08/16/2005            Lab Results   Component Value Date     CHOLHDL 29.9 08/16/2005      Last DCC: 2019  Last Echo: 4/2021  Last stress test: Lexiscan 4/2021  Cardiovascular angiogram: 2018, "no blockages"  ECG: AF, rate 99, low voltage, IRBBB, QTc 497 msec, old record ECG in 1/2023 showing NSR with PAC  Fundoscopic exam: due     WM, came on own to establish CV care for PAF with chronic fatigue. Does not feel the AF and active smoking and heavy alcohol use due to " "chronic pains. Anticipating back procedure on 11/6/2023. Very limited with CLBP along with chronic SOB. Denies any CP had angiogram in 2018 and told of no blockages.     Leonel Velasco MD noted 2/2023 "67 y.o. male is here for follow-up.        1. Chronic heart failure with preserved ejection fraction   2. Paroxysmal atrial fibrillation   3. CAD in native artery   4. Current every day smoker   5. Morbid obesity   6. Essential hypertension   7. Mixed hyperlipidemia      Echo 4/2021 - Interpretation Summary  · The left ventricle is normal in size with concentric remodeling and normal systolic function.  · The estimated ejection fraction is 60%.  · Normal left ventricular diastolic function.  · Normal right ventricular size with normal right ventricular systolic function.  · Mild left atrial enlargement.  · Mild mitral regurgitation.     Lexiscan - Interpretation Summary  ·  Abnormal myocardial perfusion scan.  ·  There is a moderate to severe intensity, moderate to large sized, mostly fixed with some reversibilty defect in the basal to distal inferior wall(s).  ·  There is a second small sized, mild to moderate intensity,  in the apex and anterobasilar wall(s).  ·  The gated perfusion images showed an ejection fraction of 44% post stress. Normal ejection fraction is greater than 53%.  ·  The EKG portion of this study is negative for ischemia.  ·  The patient reported no chest pain during the stress test.        Review of Systems   Constitutional: Positive for malaise/fatigue and weight gain. Negative for diaphoresis, fever and night sweats.   HENT:  Positive for stridor. Negative for nosebleeds and tinnitus.    Eyes:  Positive for blurred vision and redness. Negative for visual disturbance.   Cardiovascular:  Positive for dyspnea on exertion and palpitations. Negative for chest pain, claudication, cyanosis, irregular heartbeat, leg swelling, near-syncope, orthopnea and paroxysmal nocturnal dyspnea.   Respiratory:  " "Positive for shortness of breath, snoring and wheezing. Negative for cough and sleep disturbances due to breathing.         D[place] score 9, awaken tired, never tested.    Endocrine: Negative for polydipsia and polyuria.   Hematologic/Lymphatic: Bruises/bleeds easily.   Skin:  Positive for itching. Negative for color change, flushing, nail changes, poor wound healing and suspicious lesions.   Musculoskeletal:  Positive for back pain and joint pain. Negative for arthritis, falls, gout, joint swelling, muscle cramps, muscle weakness and myalgias.   Gastrointestinal:  Positive for abdominal pain, constipation and nausea. Negative for heartburn, hematemesis, hematochezia and melena.   Neurological:  Negative for disturbances in coordination, excessive daytime sleepiness, dizziness, focal weakness, headaches, light-headedness, loss of balance, numbness, vertigo and weakness.   Psychiatric/Behavioral:  Negative for depression and substance abuse. The patient does not have insomnia and is not nervous/anxious.             Objective:      Objective   Physical Exam  Constitutional:       Appearance: He is well-developed.      Comments: RA O2 sat 95%   HENT:      Head: Normocephalic.   Eyes:      Conjunctiva/sclera: Conjunctivae normal.      Pupils: Pupils are equal, round, and reactive to light.   Neck:      Thyroid: No thyromegaly.      Vascular: No JVD.      Comments: Circumference 17.5"  Cardiovascular:      Rate and Rhythm: Normal rate. Rhythm irregularly irregular.      Pulses: Intact distal pulses.           Carotid pulses are 1+ on the right side and 1+ on the left side.       Radial pulses are 1+ on the right side and 1+ on the left side.        Dorsalis pedis pulses are 1+ on the right side and 1+ on the left side.        Posterior tibial pulses are 1+ on the right side and 1+ on the left side.      Heart sounds: Heart sounds are distant. No murmur heard.     No friction rub. No gallop.      Comments: Spider veins " "bilaterally  Pulmonary:      Effort: Pulmonary effort is normal.      Breath sounds: Normal breath sounds. No rales.   Chest:      Chest wall: No tenderness.   Abdominal:      General: Bowel sounds are normal.      Palpations: Abdomen is soft.      Tenderness: There is no abdominal tenderness.      Comments: Waist 48"   Musculoskeletal:         General: Normal range of motion.      Cervical back: Normal range of motion and neck supple.      Right lower leg: No edema.      Left lower leg: No edema.   Lymphadenopathy:      Cervical: No cervical adenopathy.   Skin:     General: Skin is warm and dry.      Findings: No rash.   Neurological:      Mental Status: He is alert and oriented to person, place, and time.               Assessment:      Assessment   1. Paroxysmal A-fib    2. Chronic heart failure with preserved ejection fraction    3. Abnormal nuclear stress test    4. Chronic fatigue    5. Smoker, started age 13, 50+ year, max 1 pack per day, pack per week for past 3 years.    6. Excessive drinking alcohol    7. Uncomplicated opioid dependence, oxycodone 2-3 times daily for over 2 years    8. Primary hypertension, dx 2015    9. CAD in native artery    10. Mixed hyperlipidemia    11. SOB (shortness of breath)    12. Excessive daytime sleepiness    13. Snoring    14. Abdominal obesity             Plan:      Pascual Perkins was seen today for establish care.     Diagnoses and all orders for this visit:     Paroxysmal A-fib  -     IN OFFICE EKG 12-LEAD (to Muse)  -     Holter monitor - 48 hour; Future  -     Echo; Future  -     Polysomnogram (CPAP will be added if patient meets diagnostic criteria.); Future     Chronic heart failure with preserved ejection fraction  -     Magnesium  -     Lipid Panel  -     Comprehensive Metabolic Panel  -     CBC Without Differential  -     Echo; Future  -     Polysomnogram (CPAP will be added if patient meets diagnostic criteria.); Future     Abnormal nuclear stress test     Chronic " "fatigue  -     Polysomnogram (CPAP will be added if patient meets diagnostic criteria.); Future     Smoker, started age 13, 50+ year, max 1 pack per day, pack per week for past 3 years.  -     Ambulatory referral/consult to Smoking Cessation Program; Future     Excessive drinking alcohol     Uncomplicated opioid dependence, oxycodone 2-3 times daily for over 2 years     Primary hypertension, dx 2015     CAD in native artery  -     Magnesium  -     Lipid Panel  -     Comprehensive Metabolic Panel  -     CBC Without Differential     Mixed hyperlipidemia  -     Magnesium  -     Lipid Panel  -     Comprehensive Metabolic Panel  -     CBC Without Differential     SOB (shortness of breath)  -     Echo; Future  -     Ambulatory referral/consult to Smoking Cessation Program; Future     Excessive daytime sleepiness  -     Polysomnogram (CPAP will be added if patient meets diagnostic criteria.); Future     Snoring  -     Polysomnogram (CPAP will be added if patient meets diagnostic criteria.); Future     Abdominal obesity  -     Polysomnogram (CPAP will be added if patient meets diagnostic criteria.); Future      -          All medical issues reviewed, continue current Rx.  -           Warning signs of MI and stroke given, if symptoms last more than 5 minutes, stop immediately and call 911, then chew 2-4 low-dose ASA (81 mg).   -           Refer to "Chronic Pain" article in Consumer Report 6/2019 issue.   -           Need annual fundoscopic examination   -           Info on REED and cardiac ablations.  -           CV status and all medications reviewed, patient acknowledge good understanding.  -           Recommend healthy living: no nicotine, moderate alcohol, healthy diet and regular exercise aiming for fitness, restorative sleep and weight control  -           Discussed healthy daily limit of 1 oz of pure alcohol in any 24 hours (roughly 2 12-oz beers, 10 oz of wine (8%-12% alcohol), or 1.5 oz of liquor (80 proof)), can not " save up.   -           Need good exercise program, 4 key elements: 1. Aerobic (walking, swimming, dancing, jogging, biking, etc, 2. Muscle strengthening / resistance exercise, need to do 2-3 times weekly, 3. Stretching daily, good stretch takes a whole  total minute. 4. Balance exercise daily.   -           Encourage activities as much as tolerated. Any activity is better than none!   -           Instruction for Mediterranean diet and heart healthy exercise given.  -           Weigh twice weekly, try to lose 1-2 lbs per week. Target weight loss of 5%-10%.  -           Highly recommend 30-60 minutes of exercise / activities daily, can have Sunday off, with 2-3 sessions of muscle strengthening weekly. A  would be very helpful.

## 2023-12-13 NOTE — PLAN OF CARE
Pt tolerated procedure well. Dr. Wei and Dr. Patricia both spoke to pt after procedure. Pt awake alert and oriented. Cardiology doing follow up EKG. Pt vital signs stable. Pt family notified procedure over. Will continue to monitor.

## 2023-12-13 NOTE — Clinical Note
ID band present and verified. Family is not present. Contact # : [phone]. Call wife for pickup Scar Hutchins

## 2023-12-13 NOTE — DISCHARGE SUMMARY
[facility] Cath Lab  Discharge Note  Short Stay    Procedure(s) (LRB):  Cardioversion/Defibrillation (N/A)      OUTCOME: Patient tolerated treatment/procedure well without complication and is now ready for discharge.    DISPOSITION: Home or Self Care    FINAL DIAGNOSIS:  <principal problem not specified>    FOLLOWUP: In clinic in 1 week for ECG then 4 weeks for office visit    DISCHARGE INSTRUCTIONS: continue current medications, diet and activities.    Discharge Procedure Orders   Diet general        TIME SPENT ON DISCHARGE: 15 minutes

## 2023-12-13 NOTE — TRANSFER OF CARE
Anesthesia Transfer of Care Note    Patient: Gregorio Guerra    Procedure(s) Performed: Procedure(s) (LRB):  Cardioversion/Defibrillation (N/A)    Patient location: PACU    Anesthesia Type: general    Transport from OR: Transported from OR on room air with adequate spontaneous ventilation    Post pain: adequate analgesia    Post assessment: no apparent anesthetic complications and tolerated procedure well    Post vital signs: stable    Level of consciousness: awake, alert and oriented    Nausea/Vomiting: no nausea/vomiting    Complications: none    Transfer of care protocol was followed      Last vitals: There were no vitals taken for this visit.  
WDL

## 2023-12-14 ENCOUNTER — CLINICAL SUPPORT (OUTPATIENT)
Dept: SMOKING CESSATION | Facility: CLINIC | Age: 68
End: 2023-12-14

## 2023-12-14 DIAGNOSIS — F17.210 MODERATE SMOKER (20 OR LESS PER DAY): ICD-10-CM

## 2023-12-14 DIAGNOSIS — F17.210 LIGHT CIGARETTE SMOKER (1-9 CIGS/DAY): Primary | ICD-10-CM

## 2023-12-14 PROCEDURE — 99403 PREV MED CNSL INDIV APPRX 45: CPT | Mod: S$GLB,,, | Performed by: NURSE PRACTITIONER

## 2023-12-14 PROCEDURE — 99403 PR PREVENT COUNSEL,INDIV,45 MIN: ICD-10-PCS | Mod: S$GLB,,, | Performed by: NURSE PRACTITIONER

## 2023-12-14 RX ORDER — VARENICLINE TARTRATE 1 MG/1
1 TABLET, FILM COATED ORAL 2 TIMES DAILY
Qty: 56 TABLET | Refills: 0 | Status: SHIPPED | OUTPATIENT
Start: 2023-12-14 | End: 2024-01-11 | Stop reason: SDUPTHER

## 2023-12-14 RX ORDER — VARENICLINE TARTRATE 1 MG/1
1 TABLET, FILM COATED ORAL 2 TIMES DAILY
Qty: 56 TABLET | Refills: 0 | Status: SHIPPED | OUTPATIENT
Start: 2023-12-14 | End: 2023-12-14 | Stop reason: SDUPTHER

## 2023-12-14 NOTE — PROGRESS NOTES
Individual Follow-Up Form    12/14/2023    Quit Date: none    Clinical Status of Patient: Outpatient    Length of Service: 45 minutes    Continuing Medication: yes  Chantix    Other Medications: none     Target Symptoms: Withdrawal and medication side effects. The following were rated moderate (3) to severe (4) on TCRS:  Moderate (3): none  Severe (4): none    Comments: #2 Completion of TCRS (Tobacco Cessation Rating Scale). Patient reports decreasing cigarette smoking from a 1/2 pack per day for several years to 1 cigarette per day. He shared that in the past he was up to smoking 2 packs of cigarettes per day. We discussed and reviewed strategies, cues, and triggers. Introduced the negative impact of tobacco on health, the health advantages of discontinuing the use of tobacco, time line improved health changes after a quit, withdrawal issues to expect from nicotine and habit, and ways to achieve the goal of a quit.The patient remains on the prescribed tobacco cessation medication regimen of 1 mg Chantix BID without any negative side effects at this time. The patient denies any abnormal behavioral or mental changes at this time. The patient will continue with therapy sessions and medication monitoring by CTTS. Prescribed medication management will be by physician.     Diagnosis: F17.210    Next Visit: 1 week

## 2023-12-14 NOTE — ANESTHESIA POSTPROCEDURE EVALUATION
Anesthesia Post Evaluation    Patient: Gregorio Guerra    Procedure(s) Performed: Procedure(s) (LRB):  Cardioversion/Defibrillation (N/A)    Final Anesthesia Type: general      Patient location during evaluation: PACU  Patient participation: Yes- Able to Participate  Level of consciousness: awake and alert and oriented  Post-procedure vital signs: reviewed and stable  Pain management: adequate  Airway patency: patent    PONV status at discharge: No PONV  Anesthetic complications: no      Cardiovascular status: hemodynamically stable  Respiratory status: unassisted, spontaneous ventilation and room air  Hydration status: euvolemic  Follow-up not needed.              Vitals Value Taken Time   /87 12/13/23 1413   Temp 36.7 12/14/23 1523   Pulse 71 12/13/23 1413   Resp 18 12/13/23 1413   SpO2 97 % 12/13/23 1413         Event Time   Out of Recovery 14:17:00         Pain/Jena Score: Jena Score: 10 (12/13/2023  2:13 PM)  Modified Jena Score: 20 (12/13/2023  2:13 PM)

## 2023-12-14 NOTE — Clinical Note
Completion of TCRS (Tobacco Cessation Rating Scale). Patient reports decreasing cigarette smoking from a 1/2 pack per day for several years to 1 cigarette per day. He shared that in the past he was up to smoking 2 packs of cigarettes per day. We discussed and reviewed strategies, cues, and triggers. Introduced the negative impact of tobacco on health, the health advantages of discontinuing the use of tobacco, time line improved health changes after a quit, withdrawal issues to expect from nicotine and habit, and ways to achieve the goal of a quit.The patient remains on the prescribed tobacco cessation medication regimen of 1 mg Chantix BID without any negative side effects at this time. The patient denies any abnormal behavioral or mental changes at this time. The patient will continue with therapy sessions and medication monitoring by CTTS. Prescribed medication management will be by physician.

## 2023-12-18 RX ORDER — LOSARTAN POTASSIUM 25 MG/1
TABLET ORAL
Qty: 90 TABLET | Refills: 2 | OUTPATIENT
Start: 2023-12-18

## 2023-12-20 RX ORDER — LOSARTAN POTASSIUM 25 MG/1
25 TABLET ORAL DAILY
Qty: 90 TABLET | Refills: 3 | Status: SHIPPED | OUTPATIENT
Start: 2023-12-20

## 2024-01-04 ENCOUNTER — CLINICAL SUPPORT (OUTPATIENT)
Dept: SMOKING CESSATION | Facility: CLINIC | Age: 69
End: 2024-01-04

## 2024-01-04 DIAGNOSIS — F17.210 LIGHT CIGARETTE SMOKER (1-9 CIGS/DAY): Primary | ICD-10-CM

## 2024-01-04 NOTE — PROGRESS NOTES
Individual Follow-Up Form    1/4/2024    Quit Date: BSL    Clinical Status of Patient: Outpatient    Length of Service: 30 minutes    Continuing Medication: yes  Chantix    Other Medications: none     Target Symptoms: Withdrawal and medication side effects. The following were rated moderate (3) to severe (4) on TCRS:  Moderate (3): none  Severe (4): none    Comments:  #3 Patient reports decreasing cigarette smoking from a 1/2 pack per day for 56 years to 2 cigarettes per day. He shared Chantix is helping him with his quit. We discussed and reviewed strategies, controlling environment, cues, triggers, new goals set. Introduced high risk situations with preparation interventions, caffeine similarities with withdrawal issues of habit and nicotine, alcohol, understanding urges, cravings, stress and relaxation. Open discussion with intervention discussion.The patient remains on the prescribed tobacco cessation medication regimen of 1 mg Chantix BID without any negative side effects at this time. The patient denies any abnormal behavioral or mental changes at this time. The patient will continue with group therapy sessions and medication monitoring by CTTS. Prescribed medication management will be by physician.     Diagnosis: F17.210    Next Visit: 1 week

## 2024-01-04 NOTE — Clinical Note
Patient reports decreasing cigarette smoking from a 1/2 pack per day for 56 years to 2 cigarettes per day. He shared Chantix is helping him with his quit. We discussed and reviewed strategies, controlling environment, cues, triggers, new goals set. Introduced high risk situations with preparation interventions, caffeine similarities with withdrawal issues of habit and nicotine, alcohol, understanding urges, cravings, stress and relaxation. Open discussion with intervention discussion.The patient remains on the prescribed tobacco cessation medication regimen of 1 mg Chantix BID without any negative side effects at this time. The patient denies any abnormal behavioral or mental changes at this time. The patient will continue with group therapy sessions and medication monitoring by CTTS. Prescribed medication management will be by physician.

## 2024-01-11 ENCOUNTER — CLINICAL SUPPORT (OUTPATIENT)
Dept: SMOKING CESSATION | Facility: CLINIC | Age: 69
End: 2024-01-11

## 2024-01-11 DIAGNOSIS — F17.210 LIGHT CIGARETTE SMOKER (1-9 CIGS/DAY): Primary | ICD-10-CM

## 2024-01-11 RX ORDER — VARENICLINE TARTRATE 1 MG/1
1 TABLET, FILM COATED ORAL 2 TIMES DAILY
Qty: 56 TABLET | Refills: 0 | Status: SHIPPED | OUTPATIENT
Start: 2024-01-11
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
## 2024-01-18 ENCOUNTER — CLINICAL SUPPORT (OUTPATIENT)
Dept: SMOKING CESSATION | Facility: CLINIC | Age: 69
End: 2024-01-18

## 2024-01-18 DIAGNOSIS — F17.210 LIGHT CIGARETTE SMOKER (1-9 CIGS/DAY): Primary | ICD-10-CM

## 2024-01-18 NOTE — PROGRESS NOTES
Individual Follow-Up Form    1/18/2024    Quit Date: none    Clinical Status of Patient: Outpatient    Length of Service: 45 minutes    Continuing Medication: yes  Chantix    Other Medications: none     Target Symptoms: Withdrawal and medication side effects. The following were rated moderate (3) to severe (4) on TCRS:  Moderate (3): none  Severe (4): none    Comments: #5 Patient reports decreasing cigarette smoking from 1/2 PPD for 56 years to 2 cigarettes per day. He remains determined to stop smoking soon. We discussed and reviewed strategies, habitual behavior, high risks situations, understanding urges and cravings, stress and relaxation with open discussion and additional interventions, Introduced lapses, relapses, understanding them and analyzing the situation of a lapse, conflict issues that may be linked to a lapse. The patient remains on the prescribed tobacco cessation medication regimen of 1 mg Chantix BID without any negative side effects at this time. The patient denies any abnormal behavioral or mental changes at this time. The patient will continue with therapy sessions and medication monitoring by CTTS. Prescribed medication management will be by physician.     Diagnosis: F17.210    Next Visit: 1 week

## 2024-01-18 NOTE — Clinical Note
Patient reports decreasing cigarette smoking from 1/2 PPD for 56 years to 2 cigarettes per day. He remains determined to stop smoking soon. We discussed and reviewed strategies, habitual behavior, high risks situations, understanding urges and cravings, stress and relaxation with open discussion and additional interventions, Introduced lapses, relapses, understanding them and analyzing the situation of a lapse, conflict issues that may be linked to a lapse. The patient remains on the prescribed tobacco cessation medication regimen of 1 mg Chantix BID without any negative side effects at this time. The patient denies any abnormal behavioral or mental changes at this time. The patient will continue with therapy sessions and medication monitoring by CTTS. Prescribed medication management will be by physician.

## 2024-02-06 PROBLEM — I11.0 LVH (LEFT VENTRICULAR HYPERTROPHY) DUE TO HYPERTENSIVE DISEASE, WITH HEART FAILURE: Status: ACTIVE | Noted: 2024-02-06

## 2024-02-07 ENCOUNTER — CLINICAL SUPPORT (OUTPATIENT)
Dept: SMOKING CESSATION | Facility: CLINIC | Age: 69
End: 2024-02-07

## 2024-02-07 DIAGNOSIS — F17.210 LIGHT CIGARETTE SMOKER (1-9 CIGS/DAY): Primary | ICD-10-CM

## 2024-02-07 PROCEDURE — 99999 PR PBB SHADOW E&M-EST. PATIENT-LVL I: CPT | Mod: PBBFAC,,,

## 2024-02-07 NOTE — PROGRESS NOTES
Called pt to f/u on his 3 month smoking cessation quit status. Pt stated he is still smoking, but has cut back and is still actively enrolled in program and taking Chantix. Informed him of benefit period, phone follow ups, and contact information. Will complete smart form and will continue to follow up on quit #1 episode.

## 2024-02-27 ENCOUNTER — HOSPITAL ENCOUNTER (OUTPATIENT)
Facility: HOSPITAL | Age: 69
Discharge: HOME | End: 2024-02-27
Attending: ANESTHESIOLOGY | Admitting: ANESTHESIOLOGY
Payer: MEDICARE

## 2024-02-27 VITALS
HEART RATE: 73 BPM | TEMPERATURE: 98 F | RESPIRATION RATE: 12 BRPM | OXYGEN SATURATION: 97 % | SYSTOLIC BLOOD PRESSURE: 106 MMHG | DIASTOLIC BLOOD PRESSURE: 75 MMHG | BODY MASS INDEX: 28.36 KG/M2 | WEIGHT: 214 LBS | HEIGHT: 73 IN

## 2024-02-27 DIAGNOSIS — M47.896 OTHER SPONDYLOSIS, LUMBAR REGION: Primary | ICD-10-CM

## 2024-02-27 PROCEDURE — 64494 INJ PARAVERT F JNT L/S 2 LEV: CPT | Mod: 50 | Performed by: ANESTHESIOLOGY

## 2024-02-27 PROCEDURE — 25000003 PHARM REV CODE 250: Performed by: ANESTHESIOLOGY

## 2024-02-27 PROCEDURE — 63600175 PHARM REV CODE 636 W HCPCS: Mod: JZ,JG | Performed by: ANESTHESIOLOGY

## 2024-02-27 PROCEDURE — 64493 INJ PARAVERT F JNT L/S 1 LEV: CPT | Mod: 50 | Performed by: ANESTHESIOLOGY

## 2024-02-27 RX ORDER — SODIUM CHLORIDE, SODIUM LACTATE, POTASSIUM CHLORIDE, CALCIUM CHLORIDE 600; 310; 30; 20 MG/100ML; MG/100ML; MG/100ML; MG/100ML
INJECTION, SOLUTION INTRAVENOUS CONTINUOUS
Status: DISCONTINUED | OUTPATIENT
Start: 2024-02-27 | End: 2024-03-01

## 2024-02-27 RX ORDER — LIDOCAINE HYDROCHLORIDE 10 MG/ML
INJECTION INFILTRATION; PERINEURAL
Status: DISCONTINUED | OUTPATIENT
Start: 2024-02-27 | End: 2024-02-27 | Stop reason: HOSPADM

## 2024-02-27 RX ORDER — BUPIVACAINE HYDROCHLORIDE 5 MG/ML
INJECTION, SOLUTION EPIDURAL; INTRACAUDAL
Status: DISCONTINUED | OUTPATIENT
Start: 2024-02-27 | End: 2024-02-27 | Stop reason: HOSPADM

## 2024-02-27 RX ORDER — MIDAZOLAM HYDROCHLORIDE 5 MG/ML
INJECTION INTRAMUSCULAR; INTRAVENOUS
Status: DISCONTINUED | OUTPATIENT
Start: 2024-02-27 | End: 2024-02-27 | Stop reason: HOSPADM

## 2024-02-27 RX ADMIN — SODIUM CHLORIDE, SODIUM LACTATE, POTASSIUM CHLORIDE, AND CALCIUM CHLORIDE: .6; .31; .03; .02 INJECTION, SOLUTION INTRAVENOUS at 08:02

## 2024-02-27 NOTE — OP NOTE
PROCEDURE DATE: 2/27/2024    PROCEDURE:  Bilateral L3, L4, and L5 medial branch nerve blocks    DIAGNOSIS:  Other lumbar spondylosis    Post Op diagnosis: Same    PHYSICIAN: Lita Billings MD    MEDICATIONS INJECTED: 0.5% bupivicaine, 0.5 ml at each level    SEDATION MEDICATIONS: RN IV sedation    LOCAL ANESTHETIC USED: 1% lidocaine, 2 mL used at each level    ESTIMATED BLOOD LOSS:  none    COMPLICATIONS:  none    TECHNIQUE: A time out was taken to identify the patient, procedure and side of the procedure. The patient was placed in a prone position, then prepped and draped in the usual sterile fashion using ChloraPrep and sterile towels.  The levels were determined under fluoroscopic guidance and then marked.  A 25-gauge 3.5 inch needle was introduced to the anatomic location of the L3, L4, and L5 medial branch nerves on the bilateral side. The above medication was then injected. The patient tolerated the procedure well.     The patient was monitored after the procedure. Patient was given pain diary to record pain levels at home.     If found to have greater than a 50% recovery and so will be scheduled for a radiofrequency ablation of the corresponding nerves.  Patient was given post procedure and discharge instructions to follow at home.  The patient was discharged in a stable condition.

## 2024-02-27 NOTE — H&P
"FOLLOW UP NOTE:     CHIEF COMPLAINT: back pain    INTERVAL HISTORY OF PRESENT ILLNESS: Gregorio Guerra is a 68 y.o. male who presents for LUMBAR MBB BILATERAL L3, L4, L5 . The patient denies of any significant changes in his health since his last appointment. The patient also denies of any changes in the character of his pain since his last appointment.     ROS:  Review of Systems   Constitutional:  Negative for chills and fever.   HENT:  Negative for tinnitus.    Eyes:  Negative for visual disturbance.   Respiratory:  Negative for shortness of breath.    Cardiovascular:  Negative for chest pain.   Gastrointestinal:  Negative for nausea and vomiting.   Genitourinary:  Negative for difficulty urinating.   Musculoskeletal:  Positive for arthralgias and back pain.   Skin:  Negative for rash.   Allergic/Immunologic: Negative for immunocompromised state.   Neurological:  Negative for syncope.   Hematological:  Does not bruise/bleed easily.   Psychiatric/Behavioral:  Negative for suicidal ideas.         MEDICAL, SURGICAL, FAMILY, SOCIAL HX: reviewed    MEDICATIONS/ALLERGIES: reviewed    PHYSICAL EXAM:    VITALS: Vitals reviewed.   Vitals:    02/27/24 0815   BP: (!) 141/87   Pulse: 76   Resp: 17   Temp: 97.4 °F (36.3 °C)   SpO2: (!) 94%   Weight: 97.1 kg (214 lb)   Height: 6' 1" (1.854 m)       Physical Exam  Vitals and nursing note reviewed.   Constitutional:       Appearance: Normal appearance. He is not toxic-appearing or diaphoretic.   HENT:      Head: Normocephalic and atraumatic.   Eyes:      General:         Right eye: No discharge.         Left eye: No discharge.      Extraocular Movements: Extraocular movements intact.      Conjunctiva/sclera: Conjunctivae normal.   Cardiovascular:      Rate and Rhythm: Normal rate.   Pulmonary:      Effort: Pulmonary effort is normal. No respiratory distress.      Breath sounds: Normal breath sounds.   Abdominal:      Palpations: Abdomen is soft.   Skin:     General: Skin is " warm and dry.      Findings: No rash.   Neurological:      Mental Status: He is alert and oriented to person, place, and time.   Psychiatric:         Mood and Affect: Mood and affect normal.         Cognition and Memory: Memory normal.         Judgment: Judgment normal.          EXTREMITIES:    Gen: No cyanosis, edema, varicosities, or tenderness to palpation BLE   Skin: Warm, pink, dry, no rashes, no lesions BLE   Strength: 5/5 motor strength BLE   ROM: hips, knees and ankles without pain or instability.     NEUROLOGICAL:    Gen: No clonus or spasticity.   Gait: Normal without antalgic lean   DTR's: 2+ in bilateral patellar, and ankle   BABINSKI: Absent bilaterally  Sensory: Intact to light touch and proprioception BLE    ASSESSMENT: Gregorio Guerra is a 68 y.o. male who presents for LUMBAR MBB BILATERAL L3, L4, L5 .     PLAN:  Proceed with LUMBAR MBB BILATERAL L3, L4, L5  as previously discussed.    This patient has been cleared for surgery in an ambulatory surgical facility    ASA 3,  Mallampatti Score 3  No history of anesthetic complications  Plan for RN IV sedation    Lita Billings MD  Pain Management

## 2024-02-27 NOTE — DISCHARGE SUMMARY
[facility]  Discharge Note  Short Stay    Procedure(s) (LRB):  LUMBAR MBB BILATERAL L3, L4, L5 (Bilateral)      OUTCOME: Patient tolerated treatment/procedure well without complication and is now ready for discharge.    DISPOSITION: Home or Self Care    FINAL DIAGNOSIS:  Other spondylosis, lumbar    FOLLOWUP: In clinic    DISCHARGE INSTRUCTIONS:    Discharge Procedure Orders   Diet general     Call MD for:  temperature >100.4     Call MD for:  persistent nausea and vomiting     Call MD for:  severe uncontrolled pain     Call MD for:  difficulty breathing, headache or visual disturbances     Call MD for:  redness, tenderness, or signs of infection (pain, swelling, redness, odor or green/yellow discharge around incision site)     Call MD for:  hives     Call MD for:  persistent dizziness or light-headedness     Call MD for:  extreme fatigue        TIME SPENT ON DISCHARGE: 30 minutes

## 2024-02-27 NOTE — DISCHARGE INSTRUCTIONS
[facility]   [phone]  [facility]      [phone]      NERVE BLOCK   This was a test not a treatment.   Tips for recovery  You may use an ice pack at your injection site for comfort.  You may shower this evening.   Do not use a heating pad or take tub baths or swim for 2 days.  Take your usual medication for pain if needed.  Dont drive the day of the procedure.  Wait until tomorrow to resume any blood thinners (aspirin, Plavix, Coumadin) but you may resume all your other medications today.  When to call your doctor  Call right away if you notice any of the following symptoms:  Severe pain or headache  Fever or chills  Redness or swelling around the injection site   Difficulty breathing  Vomiting  Increasing numbness or weakness in arms or legs

## 2024-02-29 ENCOUNTER — OFFICE VISIT (OUTPATIENT)
Dept: CARDIOLOGY | Facility: CLINIC | Age: 69
End: 2024-02-29
Payer: MEDICARE

## 2024-02-29 VITALS
HEART RATE: 86 BPM | HEIGHT: 73 IN | SYSTOLIC BLOOD PRESSURE: 135 MMHG | BODY MASS INDEX: 35.87 KG/M2 | DIASTOLIC BLOOD PRESSURE: 76 MMHG | OXYGEN SATURATION: 95 % | WEIGHT: 270.63 LBS

## 2024-02-29 DIAGNOSIS — E78.00 HYPERCHOLESTEROLEMIA: ICD-10-CM

## 2024-02-29 DIAGNOSIS — I10 PRIMARY HYPERTENSION: ICD-10-CM

## 2024-02-29 DIAGNOSIS — Z79.01 CURRENT USE OF LONG TERM ANTICOAGULATION: ICD-10-CM

## 2024-02-29 DIAGNOSIS — I11.0 LVH (LEFT VENTRICULAR HYPERTROPHY) DUE TO HYPERTENSIVE DISEASE, WITH HEART FAILURE: ICD-10-CM

## 2024-02-29 DIAGNOSIS — R73.9 ELEVATED BLOOD SUGAR: ICD-10-CM

## 2024-02-29 DIAGNOSIS — E66.01 CLASS 2 SEVERE OBESITY DUE TO EXCESS CALORIES WITH SERIOUS COMORBIDITY AND BODY MASS INDEX (BMI) OF 35.0 TO 35.9 IN ADULT: ICD-10-CM

## 2024-02-29 DIAGNOSIS — F11.20 UNCOMPLICATED OPIOID DEPENDENCE: ICD-10-CM

## 2024-02-29 DIAGNOSIS — F12.10 TETRAHYDROCANNABINOL (THC) USE DISORDER, MILD, ABUSE: ICD-10-CM

## 2024-02-29 DIAGNOSIS — I50.32 CHRONIC HEART FAILURE WITH PRESERVED EJECTION FRACTION: ICD-10-CM

## 2024-02-29 DIAGNOSIS — G47.33 OSA (OBSTRUCTIVE SLEEP APNEA): ICD-10-CM

## 2024-02-29 DIAGNOSIS — F17.200 SMOKER: ICD-10-CM

## 2024-02-29 DIAGNOSIS — R53.82 CHRONIC FATIGUE: ICD-10-CM

## 2024-02-29 DIAGNOSIS — Z91.89 AT RISK FOR CARDIOVASCULAR EVENT: ICD-10-CM

## 2024-02-29 DIAGNOSIS — F10.10 EXCESSIVE DRINKING ALCOHOL: ICD-10-CM

## 2024-02-29 DIAGNOSIS — M15.9 PRIMARY OSTEOARTHRITIS INVOLVING MULTIPLE JOINTS: ICD-10-CM

## 2024-02-29 DIAGNOSIS — I48.0 PAROXYSMAL ATRIAL FIBRILLATION: Primary | ICD-10-CM

## 2024-02-29 DIAGNOSIS — Z91.89 SEDENTARY LIFESTYLE: ICD-10-CM

## 2024-02-29 DIAGNOSIS — R74.01 ELEVATED AST (SGOT): ICD-10-CM

## 2024-02-29 DIAGNOSIS — Z79.899 POLYPHARMACY: ICD-10-CM

## 2024-02-29 PROBLEM — M19.90 OA (OSTEOARTHRITIS): Status: ACTIVE | Noted: 2024-02-29

## 2024-02-29 PROBLEM — E66.812 CLASS 2 SEVERE OBESITY DUE TO EXCESS CALORIES WITH SERIOUS COMORBIDITY AND BODY MASS INDEX (BMI) OF 35.0 TO 35.9 IN ADULT: Status: ACTIVE | Noted: 2021-04-08

## 2024-02-29 PROCEDURE — 3078F DIAST BP <80 MM HG: CPT | Mod: CPTII,S$GLB,, | Performed by: INTERNAL MEDICINE

## 2024-02-29 PROCEDURE — 99215 OFFICE O/P EST HI 40 MIN: CPT | Mod: 25,S$GLB,, | Performed by: INTERNAL MEDICINE

## 2024-02-29 PROCEDURE — 1101F PT FALLS ASSESS-DOCD LE1/YR: CPT | Mod: CPTII,S$GLB,, | Performed by: INTERNAL MEDICINE

## 2024-02-29 PROCEDURE — 3008F BODY MASS INDEX DOCD: CPT | Mod: CPTII,S$GLB,, | Performed by: INTERNAL MEDICINE

## 2024-02-29 PROCEDURE — 1159F MED LIST DOCD IN RCRD: CPT | Mod: CPTII,S$GLB,, | Performed by: INTERNAL MEDICINE

## 2024-02-29 PROCEDURE — 3075F SYST BP GE 130 - 139MM HG: CPT | Mod: CPTII,S$GLB,, | Performed by: INTERNAL MEDICINE

## 2024-02-29 PROCEDURE — 93000 ELECTROCARDIOGRAM COMPLETE: CPT | Mod: S$GLB,,, | Performed by: INTERNAL MEDICINE

## 2024-02-29 PROCEDURE — 3288F FALL RISK ASSESSMENT DOCD: CPT | Mod: CPTII,S$GLB,, | Performed by: INTERNAL MEDICINE

## 2024-02-29 PROCEDURE — 99999 PR PBB SHADOW E&M-EST. PATIENT-LVL V: CPT | Mod: PBBFAC,,, | Performed by: INTERNAL MEDICINE

## 2024-02-29 RX ORDER — ROSUVASTATIN CALCIUM 20 MG/1
20 TABLET, COATED ORAL DAILY
Qty: 90 TABLET | Refills: 3 | Status: SHIPPED | OUTPATIENT
Start: 2024-02-29 | End: 2025-02-28

## 2024-02-29 NOTE — PROGRESS NOTES
"Subjective:    Patient ID:  Gregorio Guerra is a 68 y.o. male who presents for evaluation of Follow-up (Cardioversion 12/13/23)  Came on own for PAF, post DCC 12/13/2023, smoker down to 1 cpd, high ASCVD 10-year event risk of over 27% not on statin  PCP: none  Prior cardiologist: Leonel Velasco MD , last seen 2/2023  Pain: Lita Billings MD   Lives with wife, Scar, outdoor smoker  Retired [employer],     Social Determinate of Health: Do you have any problem with the following: none  Health Literacy (understanding): high  Vaccination: up to date yes, covid vaccine yes, covid infection 2022, no lasting effects  Activities: no working out, fatigue all the time for the past 2 years.  Nicotine: smoker  (1 pp week x 50 yrs), started age 13, now 1 cpd on Chantex,   Alcohol: 80 proof liquor, max 4 shots in any 24 hours, 3 times weekly.  Illicit Drugs: now and then THC gummies, Rx opioid tid for over 2 years.  Cardiac Symptoms: fatigue, felt lot better after successful DCC  Home BP: no  Medication Compliance: yes, do not skipped Eliquis now  Diet: Regular, use salt   Caffeine: Coffee 1/2 cup and a Coke per day  Labs:   Lab Results   Component Value Date    TSH 2.930 11/09/2023      No results found for: "LABA1C", "HGBA1C"    Lab Results   Component Value Date    WBC 8.43 11/09/2023    HGB 17.5 11/09/2023    HCT 50.9 11/09/2023     (H) 11/09/2023     (L) 11/09/2023       CMP  Sodium   Date Value Ref Range Status   11/09/2023 137 136 - 145 mmol/L Final   06/16/2021 137 136 - 145 mmol/L Final   06/15/2021 136 136 - 145 mmol/L Final   10/17/2018 138 134 - 144 mmol/L      Potassium   Date Value Ref Range Status   11/09/2023 4.6 3.5 - 5.1 mmol/L Final   06/16/2021 3.9 3.5 - 5.1 mmol/L Final   06/15/2021 3.8 3.5 - 5.1 mmol/L Final     Chloride   Date Value Ref Range Status   11/09/2023 104 95 - 110 mmol/L Final   06/16/2021 103 95 - 110 mmol/L Final   06/15/2021 104 95 - 110 " mmol/L Final   10/17/2018 105 98 - 110 mmol/L      CO2   Date Value Ref Range Status   11/09/2023 21 (L) 23 - 29 mmol/L Final   06/16/2021 25 23 - 29 mmol/L Final   06/15/2021 23 23 - 29 mmol/L Final     Glucose   Date Value Ref Range Status   11/09/2023 139 (H) 70 - 110 mg/dL Final   06/16/2021 108 70 - 110 mg/dL Final   06/15/2021 119 (H) 70 - 110 mg/dL Final   10/17/2018 98 70 - 99 mg/dL      BUN   Date Value Ref Range Status   11/09/2023 12 8 - 23 mg/dL Final   06/16/2021 11 8 - 23 mg/dL Final   06/15/2021 14 8 - 23 mg/dL Final     Creatinine   Date Value Ref Range Status   11/09/2023 1.1 0.5 - 1.4 mg/dL Final   06/16/2021 0.9 0.5 - 1.4 mg/dL Final   06/15/2021 0.9 0.5 - 1.4 mg/dL Final   10/17/2018 1.06 0.60 - 1.40 mg/dL      Calcium   Date Value Ref Range Status   11/09/2023 9.3 8.7 - 10.5 mg/dL Final   06/16/2021 8.5 (L) 8.7 - 10.5 mg/dL Final   06/15/2021 8.3 (L) 8.7 - 10.5 mg/dL Final     Total Protein   Date Value Ref Range Status   11/09/2023 7.4 6.0 - 8.4 g/dL Final   06/16/2021 6.6 6.0 - 8.4 g/dL Final   06/15/2021 6.4 6.0 - 8.4 g/dL Final     Albumin   Date Value Ref Range Status   11/09/2023 3.9 3.5 - 5.2 g/dL Final   06/16/2021 3.5 3.5 - 5.2 g/dL Final   06/15/2021 3.3 (L) 3.5 - 5.2 g/dL Final   10/17/2018 4.2 3.1 - 4.7 g/dL      Total Bilirubin   Date Value Ref Range Status   11/09/2023 1.1 (H) 0.1 - 1.0 mg/dL Final     Comment:     For infants and newborns, interpretation of results should be based  on gestational age, weight and in agreement with clinical  observations.    Premature Infant recommended reference ranges:  Up to 24 hours.............<8.0 mg/dL  Up to 48 hours............<12.0 mg/dL  3-5 days..................<15.0 mg/dL  6-29 days.................<15.0 mg/dL     06/16/2021 1.1 (H) 0.1 - 1.0 mg/dL Final     Comment:     For infants and newborns, interpretation of results should be based  on gestational age, weight and in agreement with clinical  observations.    Premature Infant  recommended reference ranges:  Up to 24 hours.............<8.0 mg/dL  Up to 48 hours............<12.0 mg/dL  3-5 days..................<15.0 mg/dL  6-29 days.................<15.0 mg/dL     06/15/2021 1.6 (H) 0.1 - 1.0 mg/dL Final     Comment:     For infants and newborns, interpretation of results should be based  on gestational age, weight and in agreement with clinical  observations.    Premature Infant recommended reference ranges:  Up to 24 hours.............<8.0 mg/dL  Up to 48 hours............<12.0 mg/dL  3-5 days..................<15.0 mg/dL  6-29 days.................<15.0 mg/dL       Alkaline Phosphatase   Date Value Ref Range Status   11/09/2023 82 55 - 135 U/L Final   06/16/2021 70 55 - 135 U/L Final   06/15/2021 69 55 - 135 U/L Final     AST   Date Value Ref Range Status   11/09/2023 46 (H) 10 - 40 U/L Final   06/16/2021 22 10 - 40 U/L Final   06/15/2021 23 10 - 40 U/L Final     ALT   Date Value Ref Range Status   11/09/2023 38 10 - 44 U/L Final   06/16/2021 32 10 - 44 U/L Final   06/15/2021 33 10 - 44 U/L Final     Anion Gap   Date Value Ref Range Status   11/09/2023 12 8 - 16 mmol/L Final   06/16/2021 9 8 - 16 mmol/L Final   06/15/2021 9 8 - 16 mmol/L Final     eGFR   Date Value Ref Range Status   11/09/2023 >60.0 >60 mL/min/1.73 m^2 Final     @labrcntip(troponini)@    BNP   Date Value Ref Range Status   11/09/2023 193 (H) 0 - 99 pg/mL Final     Comment:     Values of less than 100 pg/ml are consistent with non-CHF populations.   }   Lab Results   Component Value Date    CHOL 240 (H) 11/09/2023    CHOL 174 08/16/2005     Lab Results   Component Value Date    HDL 56 11/09/2023    HDL 52.0 08/16/2005     Lab Results   Component Value Date    LDLCALC 157.4 11/09/2023    LDLCALC 106.2 08/16/2005     Lab Results   Component Value Date    TRIG 133 11/09/2023    TRIG 79 08/16/2005     Lab Results   Component Value Date    CHOLHDL 23.3 11/09/2023    CHOLHDL 29.9 08/16/2005      Last DCC: 12/13/2023  Last  "Echo: 4/2021  Last stress test: Lexiscan 4/2021  Cardiovascular angiogram: 2018, "no blockages"  ECG: NSR rate 80, low voltage, IRBBB, QTc 452 msec,   Fundoscopic exam: due    In 10/2023:  WM, came on own to establish CV care for PAF with chronic fatigue. Does not feel the AF and active smoking and heavy alcohol use due to chronic pains. Anticipating back procedure on 11/6/2023. Very limited with CLBP along with chronic SOB. Denies any CP had angiogram in 2018 and told of no blockages.    Leonel Velasco MD noted 2/2023 "67 y.o. male is here for follow-up.       1. Chronic heart failure with preserved ejection fraction   2. Paroxysmal atrial fibrillation   3. CAD in native artery   4. Current every day smoker   5. Morbid obesity   6. Essential hypertension   7. Mixed hyperlipidemia     Echo 4/2021 - Interpretation Summary  · The left ventricle is normal in size with concentric remodeling and normal systolic function.  · The estimated ejection fraction is 60%.  · Normal left ventricular diastolic function.  · Normal right ventricular size with normal right ventricular systolic function.  · Mild left atrial enlargement.  · Mild mitral regurgitation.    Lexiscan - Interpretation Summary  ·  Abnormal myocardial perfusion scan.  ·  There is a moderate to severe intensity, moderate to large sized, mostly fixed with some reversibilty defect in the basal to distal inferior wall(s).  ·  There is a second small sized, mild to moderate intensity,  in the apex and anterobasilar wall(s).  ·  The gated perfusion images showed an ejection fraction of 44% post stress. Normal ejection fraction is greater than 53%.  ·  The EKG portion of this study is negative for ischemia.  ·  The patient reported no chest pain during the stress test.    HPI comments: in 2/2024, return for follow up post DCC, positive for REED and awaiting CPAP titration. Also with high ASCVD 10-year event risk of over 27% not on statin, baseline LDL-C 157. Denies " any CV symptoms. Troubled by chronic pains.     Review of Systems   Constitutional: Positive for malaise/fatigue and weight gain (up 7lbs from 10/2023.). Negative for diaphoresis, fever and night sweats.   HENT:  Positive for stridor. Negative for nosebleeds and tinnitus.    Eyes:  Positive for blurred vision and redness. Negative for visual disturbance.   Cardiovascular:  Positive for dyspnea on exertion and palpitations. Negative for chest pain, claudication, cyanosis, irregular heartbeat, leg swelling, near-syncope, orthopnea and paroxysmal nocturnal dyspnea.   Respiratory:  Positive for shortness of breath, snoring and wheezing. Negative for cough and sleep disturbances due to breathing.         [place] score 9 today a 4, awaken tired, have positive sleep apnea, waiting on CPAP titration.    Endocrine: Negative for polydipsia and polyuria.   Hematologic/Lymphatic: Bruises/bleeds easily.   Skin:  Positive for itching. Negative for color change, flushing, nail changes, poor wound healing and suspicious lesions.   Musculoskeletal:  Positive for back pain and joint pain. Negative for arthritis, falls, gout, joint swelling, muscle cramps, muscle weakness and myalgias.   Gastrointestinal:  Positive for abdominal pain, constipation and nausea. Negative for heartburn, hematemesis, hematochezia and melena.   Neurological:  Negative for disturbances in coordination, excessive daytime sleepiness, dizziness, focal weakness, headaches, light-headedness, loss of balance, numbness, vertigo and weakness.   Psychiatric/Behavioral:  Negative for depression and substance abuse. The patient does not have insomnia and is not nervous/anxious.         Objective:    Physical Exam  Constitutional:       Appearance: He is well-developed.      Comments: RA O2 sat 95%  Orthostatic VS: sitting 139/85, standing 135/76   HENT:      Head: Normocephalic.   Eyes:      Conjunctiva/sclera: Conjunctivae normal.      Pupils: Pupils are equal, round,  "and reactive to light.   Neck:      Thyroid: No thyromegaly.      Vascular: No JVD.      Comments: Circumference 17.5"  Cardiovascular:      Rate and Rhythm: Normal rate. Rhythm irregularly irregular.      Pulses: Intact distal pulses.           Carotid pulses are 1+ on the right side and 1+ on the left side.       Radial pulses are 1+ on the right side and 1+ on the left side.        Dorsalis pedis pulses are 1+ on the right side and 1+ on the left side.        Posterior tibial pulses are 1+ on the right side and 1+ on the left side.      Heart sounds: Heart sounds are distant. No murmur heard.     No friction rub. No gallop.      Comments: Spider veins bilaterally  Pulmonary:      Effort: Pulmonary effort is normal.      Breath sounds: Normal breath sounds. No rales.   Chest:      Chest wall: No tenderness.   Abdominal:      General: Bowel sounds are normal.      Palpations: Abdomen is soft.      Tenderness: There is no abdominal tenderness.      Comments: Waist 48"   Musculoskeletal:         General: Normal range of motion.      Cervical back: Normal range of motion and neck supple.      Right lower leg: No edema.      Left lower leg: No edema.   Lymphadenopathy:      Cervical: No cervical adenopathy.   Skin:     General: Skin is warm and dry.      Findings: No rash.   Neurological:      Mental Status: He is alert and oriented to person, place, and time.           Assessment:       1. Paroxysmal atrial fibrillation    2. Chronic heart failure with preserved ejection fraction    3. Chronic fatigue    4. Primary osteoarthritis involving multiple joints    5. Sedentary lifestyle    6. Class 2 severe obesity due to excess calories with serious comorbidity and body mass index (BMI) of 35.0 to 35.9 in adult    7. Excessive drinking alcohol    8. Smoker, started age 13, 50+ year, max 1 pack per day, pack per week for past 3 years. Now just 1 cpd on Chantex    9. Uncomplicated opioid dependence, oxycodone 2-3 times daily " for over 2 years    10. Primary hypertension, dx 2018    11. Tetrahydrocannabinol (THC) use disorder, mild, abuse, gummies, one a day since 2022    12. Elevated AST (SGOT)    13. Current use of long term anticoagulation    14. Polypharmacy    15. Elevated blood sugar    16. At risk for cardiovascular event, ASCVD 10-year risk 27.2%, 11/2023    17. Hypercholesterolemia, baseline LDL-C 157    18. REED (obstructive sleep apnea), awaiting CPAP titration.    19. LVH (left ventricular hypertrophy) due to hypertensive disease, with heart failure         Plan:       Gregorio was seen today for follow-up.    Diagnoses and all orders for this visit:    Paroxysmal atrial fibrillation  -     IN OFFICE EKG 12-LEAD (to Muse)  -     apixaban (ELIQUIS) 5 mg Tab; Take 1 tablet (5 mg total) by mouth 2 (two) times daily.    Chronic heart failure with preserved ejection fraction    Chronic fatigue    Primary osteoarthritis involving multiple joints    Sedentary lifestyle    Class 2 severe obesity due to excess calories with serious comorbidity and body mass index (BMI) of 35.0 to 35.9 in adult  -     Ambulatory referral/consult to Family Practice; Future  -     Hemoglobin A1C; Future    Excessive drinking alcohol  -     Ambulatory referral/consult to Family Practice; Future  -     US Abdomen Limited; Future    Smoker, started age 13, 50+ year, max 1 pack per day, pack per week for past 3 years. Now just 1 cpd on Chantex    Uncomplicated opioid dependence, oxycodone 2-3 times daily for over 2 years    Primary hypertension, dx 2018    Tetrahydrocannabinol (THC) use disorder, mild, abuse, gummies, one a day since 2022    Elevated AST (SGOT)  -     US Abdomen Limited; Future    Current use of long term anticoagulation  -     apixaban (ELIQUIS) 5 mg Tab; Take 1 tablet (5 mg total) by mouth 2 (two) times daily.    Polypharmacy  -     Ambulatory referral/consult to Family Practice; Future    Elevated blood sugar  -     Hemoglobin A1C;  "Future    At risk for cardiovascular event, ASCVD 10-year risk 27.2%, 11/2023  -     rosuvastatin (CRESTOR) 20 MG tablet; Take 1 tablet (20 mg total) by mouth once daily.  -     Lipid Panel; Future    Hypercholesterolemia, baseline LDL-C 157  -     rosuvastatin (CRESTOR) 20 MG tablet; Take 1 tablet (20 mg total) by mouth once daily.  -     Lipid Panel; Future    REED (obstructive sleep apnea), awaiting CPAP titration.    LVH (left ventricular hypertrophy) due to hypertensive disease, with heart failure     - All medical issues reviewed, willing to proceed with high-intensity statin.  - Warning signs of MI and stroke given, if symptoms last more than 5 minutes, stop immediately and call 911, then chew 2-4 low-dose ASA (81 mg).   - Refer to "Chronic Pain" article in Consumer Report 6/2019 issue.   - Need annual fundoscopic examination   - Info on REED and cardiac ablations. Need to complete CPAP titration.  - CV status and all medications reviewed, patient acknowledge good understanding.  - Recommend healthy living: no nicotine, moderate alcohol, healthy diet and regular exercise aiming for fitness, restorative sleep and weight control  - Discussed healthy daily limit of 1 oz of pure alcohol in any 24 hours (roughly 2 12-oz beers, 10 oz of wine (8%-12% alcohol), or 1.5 oz of liquor (80 proof)), can not save up.   - Need good exercise program, 4 key elements: 1. Aerobic (walking, swimming, dancing, jogging, biking, etc, 2. Muscle strengthening / resistance exercise, need to do 2-3 times weekly, 3. Stretching daily, good stretch takes a whole  total minute. 4. Balance exercise daily.   - Encourage activities as much as tolerated. Any activity is better than none!   - Instruction for Mediterranean diet and heart healthy exercise given.  - Weigh twice weekly, try to lose 1-2 lbs per week. Target weight loss of 5%-10%.  - Highly recommend 30-60 minutes of exercise / activities daily, can have Sunday off, with 2-3 sessions of " muscle strengthening weekly. A  would be very helpful.  - Will follow up in 3 months to check efficacy  - Phone review / encourage use of [facility]       Total time spend including review of record prior to face-to-face visit is 50 minutes. Greater than 50% of the time was spent in counseling and coordination of care. The above assessment and plan have been discussed at length. Referring provider's note reviewed. Labs and procedure over the last 6 months reviewed. Problem List updated. Asked to bring in all active medications / pills bottles with next visit. Will send note to referring / PCP.

## 2024-02-29 NOTE — PATIENT INSTRUCTIONS
Recommended Mediterranean dietEating Heart-Healthy Food: Using the DASH Plan  Eating for your heart doesnt have to be hard or boring. You just need to know how to make healthier choices. The DASH eating plan has been developed to help you do just that. DASH stands for Dietary Approaches to Stop Hypertension. It is a plan that has been proven to be healthier for your heart and to lower your risk for high blood pressure. It can also help lower your risk for cancer, heart disease, osteoporosis, and diabetes.  Choosing from Each Food Group  Choose foods from each of the food groups below each day. Try to get the recommended number of servings for each food group. The serving numbers are based on a diet of 2,000 calories a day. Talk to your doctor if youre unsure about your calorie needs.  Grains   Servings: 7-8 a day  A serving is:  1 slice bread  1 ounce dry cereal  half a cup cooked rice or pasta  Best choices: Whole grains and any grains high in fiber.  Vegetables   Servings: 4-5 a day  A serving is:  1 cup raw leafy vegetable  Half a cup cooked vegetable  Three-quarter cup vegetable juice  Best choices: Fresh or frozen vegetable prepared without too much added salt or fat.    Fruits   Servings: 4-5 a day  A serving is:  Three-quarter cup fruit juice  1 medium fruit  One-quarter cup dried fruit  One-half cup fresh, frozen, or canned fruit  Best choices: A variety of fresh fruits of different colors. Whole fruits are a much better choice than fruit juices.  Low-fat or Fat Free Dairy   Servings: 2-3 a day  A serving is:  8 ounces milk  1 cup yogurt  One and a half ounces cheese  Best choices: Skim or 1% milk, low-fat or fat free yogurt or buttermilk, and low-fat cheeses.       Meat, Poultry, Fish   Servings: 2 or fewer a day  A serving is:  3 ounces cooked meat, poultry, or fish  Best choices: Lean meats and fish. Trim away visible fat. Broil, roast, or boil instead of frying. Remove skin from poultry before eating.   Nuts, Seeds, Beans   Servings: 4-5 a week  A serving is:  One third cup nuts (or one and a half ounces)  2 tablespoons sunflower seeds  Half a cup cooked beans  Best choices: Dry roasted nuts with no salt added, lentils, kidney beans, garbanzo beans, and whole alva beans.    Fats and Oils   Servings: 2 a day  A serving is:  1 teaspoon vegetable oil  1 teaspoon soft margarine  1 tablespoon low-fat mayonnaise  1 teaspoon regular mayonnaise  2 tablespoons light salad dressing  1 tablespoon regular salad dressing  Best choices: Monounsaturated and polyunsaturated fats such as olive, canola, or safflower oil.  Sweets   Servings: 5 a week or fewer  A serving is:  1 tablespoon sugar, maple syrup, or honey  1 tablespoon jam or jelly  1 half-ounce jelly beans (about 15)  8 ounces lemonade  Best choices: Dried fruit can be a satisfying sweet. Choose low-fat sweets when possible. And watch your serving sizes!       Aerobic Exercise for a Healthy Heart  Exercise is a lot more than an energy booster and a stress reliever. It also strengthens your heart muscle, lowers your blood pressure and blood cholesterol, and burns calories.      Remember, some activity is better than none.     Choose an Aerobic Activity  Choose a nonstop activity that makes your heart and lungs work harder than they do when you rest or walk normally. This aerobic exercise can improve the way your heart and other muscles use oxygen. Make it fun by exercising with a friend and choosing an activity you enjoy. Here are some ideas:  Walking  Swimming  Bicycling  Stair climbing  Dancing  Jogging  Exercise Regularly  If you havent been exercising regularly,  get your doctors okay first. Then start slowly.  Here are some tips:  Begin exercising 3 times a week for 5-10 minutes at a time.  When you feel comfortable, add a few minutes each week.  Slowly build up to exercising 3-4 times each week for 20-40 minutes. Aim for a total of 150 or more minutes a  week.  Be sure to carry your nitroglycerin with you when you exercise.  If you get angina when youre exercising, stop what youre doing, take your nitroglycerin, and call your doctor.  © 4531-0745 [employer], [address] [place] [address], [place] [address]. All rights reserved. This information is not intended as a substitute for professional medical care. Always follow your healthcare professional's instructions.    Losing Weight (Cardiovascular)  Excess weight is a major risk factor for heart disease. Losing weight may help keep your arteries open so that your heart can get the oxygen-rich blood it needs. Weight loss can also help lower your blood pressure and reduce your risk for diabetes. All in all, losing weight makes you healthier.          Exercise with a friend. When activity is fun, you're more likely to stick with it.        Calories and Weight Loss  Calories are the fuel your body burns for energy. You get the calories you need from the food you eat. For healthy weight loss, women should eat at least 1,200 calories a day, men at least 1,500.    When you eat more calories than you need, your body stores the extra calories as fat. One pound of fat equals 3,500 calories.    To lose weight, try to burn 500 calories a day more than you eat. To do this, eat 250 calories less each day. Add activity to burn the other 250 calories. Walking 21/2 miles burns about 250 calories.    Eat a variety of healthy foods. Its the best way to make calories count.     Tips for losing weight:  Drink 8 to 10 glasses of water a day.    Dont skip meals. Instead, eat smaller portions.       Brisk Activity Is Best  Brisk activity gets your heart pumping faster. It makes your heart healthier. Its also the best way to burn calories. In fact, your body may keep burning calories for hours after you stop a brisk activity.    Begin by walking 10 minutes most days.    Add more time and speed to your walk. Build up as you feel  able.    Try to walk briskly at least 30 minutes most days. If needed, you can break this into 2 shorter sessions.     Check off the ideas below that you could try to make your day more active:    Take the stairs instead of the elevator.    Park your car farther away and walk.    Ride a bike to work or to the store.    Walk laps around the mall.

## 2024-03-01 ENCOUNTER — OFFICE VISIT (OUTPATIENT)
Dept: FAMILY MEDICINE | Facility: CLINIC | Age: 69
End: 2024-03-01
Payer: MEDICARE

## 2024-03-01 VITALS
OXYGEN SATURATION: 98 % | WEIGHT: 261.88 LBS | DIASTOLIC BLOOD PRESSURE: 86 MMHG | HEIGHT: 73 IN | BODY MASS INDEX: 34.71 KG/M2 | SYSTOLIC BLOOD PRESSURE: 126 MMHG | HEART RATE: 91 BPM

## 2024-03-01 DIAGNOSIS — Z79.899 POLYPHARMACY: ICD-10-CM

## 2024-03-01 DIAGNOSIS — F10.10 EXCESSIVE DRINKING ALCOHOL: ICD-10-CM

## 2024-03-01 DIAGNOSIS — Z11.59 NEED FOR HEPATITIS C SCREENING TEST: ICD-10-CM

## 2024-03-01 DIAGNOSIS — F17.200 SMOKER: ICD-10-CM

## 2024-03-01 DIAGNOSIS — Z12.11 COLON CANCER SCREENING: ICD-10-CM

## 2024-03-01 DIAGNOSIS — Z87.891 PERSONAL HISTORY OF NICOTINE DEPENDENCE: ICD-10-CM

## 2024-03-01 DIAGNOSIS — I50.32 CHRONIC HEART FAILURE WITH PRESERVED EJECTION FRACTION: ICD-10-CM

## 2024-03-01 DIAGNOSIS — E66.01 CLASS 2 SEVERE OBESITY DUE TO EXCESS CALORIES WITH SERIOUS COMORBIDITY AND BODY MASS INDEX (BMI) OF 35.0 TO 35.9 IN ADULT: ICD-10-CM

## 2024-03-01 DIAGNOSIS — D69.6 THROMBOCYTOPENIA: ICD-10-CM

## 2024-03-01 DIAGNOSIS — I48.0 PAROXYSMAL ATRIAL FIBRILLATION: Primary | ICD-10-CM

## 2024-03-01 PROBLEM — R06.83 SNORING: Status: RESOLVED | Noted: 2023-10-26 | Resolved: 2024-03-01

## 2024-03-01 PROBLEM — R94.31 NONSPECIFIC ABNORMAL ELECTROCARDIOGRAM (ECG) (EKG): Status: RESOLVED | Noted: 2021-03-23 | Resolved: 2024-03-01

## 2024-03-01 PROBLEM — I50.9 CONGESTIVE HEART FAILURE (CHF): Status: RESOLVED | Noted: 2019-02-01 | Resolved: 2024-03-01

## 2024-03-01 PROBLEM — R53.82 CHRONIC FATIGUE: Status: RESOLVED | Noted: 2021-03-23 | Resolved: 2024-03-01

## 2024-03-01 PROBLEM — Z98.890 POSTOPERATIVE STATE: Status: RESOLVED | Noted: 2018-11-20 | Resolved: 2024-03-01

## 2024-03-01 PROBLEM — G47.19 EXCESSIVE DAYTIME SLEEPINESS: Status: RESOLVED | Noted: 2023-10-26 | Resolved: 2024-03-01

## 2024-03-01 PROBLEM — R06.02 SOB (SHORTNESS OF BREATH): Status: RESOLVED | Noted: 2023-10-26 | Resolved: 2024-03-01

## 2024-03-01 PROBLEM — R73.9 ELEVATED BLOOD SUGAR: Status: RESOLVED | Noted: 2024-02-29 | Resolved: 2024-03-01

## 2024-03-01 PROBLEM — E65 ABDOMINAL OBESITY: Status: RESOLVED | Noted: 2023-10-26 | Resolved: 2024-03-01

## 2024-03-01 PROBLEM — M16.11 PRIMARY OSTEOARTHRITIS OF RIGHT HIP: Status: RESOLVED | Noted: 2018-10-05 | Resolved: 2024-03-01

## 2024-03-01 PROBLEM — R74.01 ELEVATED AST (SGOT): Status: RESOLVED | Noted: 2024-02-29 | Resolved: 2024-03-01

## 2024-03-01 PROBLEM — E78.2 MIXED HYPERLIPIDEMIA: Status: ACTIVE | Noted: 2024-02-29

## 2024-03-01 LAB
OHS QRS DURATION: 106 MS
OHS QTC CALCULATION: 452 MS

## 2024-03-01 PROCEDURE — 1125F AMNT PAIN NOTED PAIN PRSNT: CPT | Mod: CPTII,S$GLB,, | Performed by: STUDENT IN AN ORGANIZED HEALTH CARE EDUCATION/TRAINING PROGRAM

## 2024-03-01 PROCEDURE — 3079F DIAST BP 80-89 MM HG: CPT | Mod: CPTII,S$GLB,, | Performed by: STUDENT IN AN ORGANIZED HEALTH CARE EDUCATION/TRAINING PROGRAM

## 2024-03-01 PROCEDURE — 99214 OFFICE O/P EST MOD 30 MIN: CPT | Mod: S$GLB,,, | Performed by: STUDENT IN AN ORGANIZED HEALTH CARE EDUCATION/TRAINING PROGRAM

## 2024-03-01 PROCEDURE — 3008F BODY MASS INDEX DOCD: CPT | Mod: CPTII,S$GLB,, | Performed by: STUDENT IN AN ORGANIZED HEALTH CARE EDUCATION/TRAINING PROGRAM

## 2024-03-01 PROCEDURE — 1160F RVW MEDS BY RX/DR IN RCRD: CPT | Mod: CPTII,S$GLB,, | Performed by: STUDENT IN AN ORGANIZED HEALTH CARE EDUCATION/TRAINING PROGRAM

## 2024-03-01 PROCEDURE — 1159F MED LIST DOCD IN RCRD: CPT | Mod: CPTII,S$GLB,, | Performed by: STUDENT IN AN ORGANIZED HEALTH CARE EDUCATION/TRAINING PROGRAM

## 2024-03-01 PROCEDURE — 3074F SYST BP LT 130 MM HG: CPT | Mod: CPTII,S$GLB,, | Performed by: STUDENT IN AN ORGANIZED HEALTH CARE EDUCATION/TRAINING PROGRAM

## 2024-03-01 RX ORDER — DM/P-EPHED/ACETAMINOPH/DOXYLAM 30-7.5/3
2 LIQUID (ML) ORAL
Qty: 20 LOZENGE | Refills: 5 | Status: SHIPPED | OUTPATIENT
Start: 2024-03-01 | End: 2024-03-14

## 2024-03-01 NOTE — PROGRESS NOTES
"  [facility]    [facility]  [address], [address]  [place] [address]    Subjective     Patient ID: Gregorio Guerra is a 68 y.o. male who comes to the clinic to establish care.    Chief Complaint: Establish Care (Est care, Medication Refills)    This is a very pleasant gentleman referred to me by Dr. Wei.  I appreciate the referral    Afib - sees Dr. He, is s/p DC cardioversion in Dec 2023, is doing great now, in sinus rhythm. 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 Takes lasix 20mg daily, also on losartan, metoprolol succinate, and aldactone. Has never been hospitalized for HF exacerbation    Chronic back pain - on percocet 10mg TID, written by Dr. Billings. Also on gabapentin for neuropathy    HLD - ASCVD risk is at 27%    Thrombocytopenia - platelets in the 110-130 range, also has macrocytosis so this is likely a liver issue. Has a liver u/s scheduled for next friday    ROS negative unless stated above       Objective     Vitals:    03/01/24 1104   BP: 126/86   BP Location: Right arm   Patient Position: Sitting   BP Method: Large (Manual)   Pulse: 91   SpO2: 98%   Weight: 118.8 kg (261 lb 14.4 oz)   Height: 6' 1" (1.854 m)        Wt Readings from Last 3 Encounters:   03/01/24 1104 118.8 kg (261 lb 14.4 oz)   02/29/24 1518 122.7 kg (270 lb 9.6 oz)   02/27/24 0815 97.1 kg (214 lb)        Physical Exam  Vitals reviewed.   Constitutional:       Appearance: Normal appearance. He is obese.   HENT:      Head: Normocephalic and atraumatic.   Eyes:      Extraocular Movements: Extraocular movements intact.      Pupils: Pupils are equal, round, and reactive to light.   Cardiovascular:      Rate and Rhythm: Normal rate.      Pulses: Normal pulses.      Heart sounds: Normal heart sounds.   Pulmonary:      Effort: Pulmonary effort is normal. No respiratory distress.      Breath sounds: Normal breath " "sounds.   Musculoskeletal:         General: Normal range of motion.      Cervical back: Normal range of motion and neck supple.      Right lower leg: Edema present.      Left lower leg: Edema present.      Comments: Mild, +1 up to ankles   Skin:     General: Skin is dry.      Capillary Refill: Capillary refill takes less than 2 seconds.   Neurological:      General: No focal deficit present.      Mental Status: He is alert and oriented to person, place, and time. Mental status is at baseline.   Psychiatric:         Mood and Affect: Mood normal.         Behavior: Behavior normal.         Thought Content: Thought content normal.         Current Outpatient Medications   Medication Instructions    albuterol (VENTOLIN HFA) 90 mcg/actuation inhaler 2 puffs, Inhalation, Every 6 hours PRN, Rescue    apixaban (ELIQUIS) 5 mg, Oral, 2 times daily    colchicine (COLCRYS) 0.6 mg, Oral, Daily    cyclobenzaprine (FLEXERIL) 10 MG tablet No dose, route, or frequency recorded.    furosemide (LASIX) 20 MG tablet TAKE 1 TABLET BY MOUTH EVERY DAY "THANK YOU"    gabapentin (NEURONTIN) 300 MG capsule No dose, route, or frequency recorded.    HYDROcodone-acetaminophen (NORCO)  mg per tablet Oral    ipratropium (ATROVENT) 500 mcg, Nebulization, Every 6 hours PRN, Rescue    losartan (COZAAR) 25 mg, Oral, Daily    metoprolol succinate (TOPROL-XL) 50 mg, Oral, Daily    nicotine polacrilex 2 mg, Oral, As needed (PRN)    oxyCODONE-acetaminophen (PERCOCET)  mg per tablet 1 tablet, Oral    oxyCODONE-acetaminophen (PERCOCET) 7.5-325 mg per tablet 1 tablet, Oral    rosuvastatin (CRESTOR) 20 mg, Oral, Daily    sildenafiL (VIAGRA) 50 MG tablet TAKE 1 TABLET BY MOUTH AS NEEDED FOR ERECTILE DYSFUNCTION    spironolactone (ALDACTONE) 25 mg, Oral, Daily    varenicline (CHANTIX) 1 mg, Oral, 2 times daily           Assessment and Plan     1. Paroxysmal atrial fibrillation    2. Class 2 severe obesity due to excess calories with serious " comorbidity and body mass index (BMI) of 35.0 to 35.9 in adult  -     Ambulatory referral/consult to Family Practice    3. Excessive drinking alcohol  -     Ambulatory referral/consult to Family Practice    4. Polypharmacy  -     Ambulatory referral/consult to Family Practice    5. Thrombocytopenia    6. Colon cancer screening  -     Cologuard Screening (Multitarget Stool DNA); Future; Expected date: 03/01/2024    7. Smoker, started age 13, 50+ year, max 1 pack per day, pack per week for past 3 years. Now just 1 cpd on Chantex  -     nicotine polacrilex 2 MG Lozg; Take 1 lozenge (2 mg total) by mouth as needed.  Dispense: 20 lozenge; Refill: 5  -     CT Chest Lung Screening Low Dose; Future; Expected date: 03/01/2024    8. Need for hepatitis C screening test  -     Hepatitis C antibody; Future; Expected date: 03/01/2024    9. Personal history of nicotine dependence  -     CT Chest Lung Screening Low Dose; Future; Expected date: 03/01/2024    10. Chronic heart failure with preserved ejection fraction      Here to establish care    This is a very pleasant gentleman referred to me by Dr. Wei.  I appreciate the referral    For afib, continue meds and follow up w/ Dr. He regularly    For CHF, continue current regimen and follow-up with Dr. He    Whenever need to cut back on cigarette usage even if it is just 1 CPD. Spent >5mins counseling on tobacco cessation. Went over risks a/w this    For thrombocytopenia and macrocytosis, likely has liver disease. Hope this is not cirrhosis.  Liver ultrasound is scheduled for next Friday .  Encouraged to stop all drinking and improve lifestyle modification    For obesity, whenever important need for caloric deficit in cutting out carbohydrates.  Exercise will also be helpful.  Gave weight loss goal of 15 lb in 3 months     Low-dose CT scan ordered for history of tobacco usage    Nicotine lozenges ordered for tobacco use.    RTC in 3 months for weight check, 15 lb weight loss goal  given         I encouraged the patient to take all medications as prescribed and to keep follow up appointments with their providers. Patient stated they had no other concerns. Questions were invited and answered. Follow up sooner if needed.     Girish Roberts MD  03/01/2024 11:12 AM

## 2024-03-04 RX ORDER — FUROSEMIDE 20 MG/1
TABLET ORAL
Qty: 90 TABLET | Refills: 3 | OUTPATIENT
Start: 2024-03-04

## 2024-03-04 RX ORDER — FUROSEMIDE 20 MG/1
TABLET ORAL
Qty: 90 TABLET | Refills: 2 | OUTPATIENT
Start: 2024-03-04

## 2024-03-08 ENCOUNTER — HOSPITAL ENCOUNTER (OUTPATIENT)
Dept: RADIOLOGY | Facility: HOSPITAL | Age: 69
Discharge: HOME | End: 2024-03-08
Attending: STUDENT IN AN ORGANIZED HEALTH CARE EDUCATION/TRAINING PROGRAM
Payer: MEDICARE

## 2024-03-08 ENCOUNTER — HOSPITAL ENCOUNTER (OUTPATIENT)
Dept: RADIOLOGY | Facility: HOSPITAL | Age: 69
Discharge: HOME | End: 2024-03-08
Attending: INTERNAL MEDICINE
Payer: MEDICARE

## 2024-03-08 DIAGNOSIS — Z87.891 PERSONAL HISTORY OF NICOTINE DEPENDENCE: ICD-10-CM

## 2024-03-08 DIAGNOSIS — F17.200 SMOKER: ICD-10-CM

## 2024-03-08 DIAGNOSIS — F10.10 EXCESSIVE DRINKING ALCOHOL: ICD-10-CM

## 2024-03-08 DIAGNOSIS — R74.01 ELEVATED AST (SGOT): ICD-10-CM

## 2024-03-08 PROBLEM — K76.0 FATTY LIVER: Status: ACTIVE | Noted: 2024-03-08

## 2024-03-08 PROCEDURE — 71271 CT THORAX LUNG CANCER SCR C-: CPT | Mod: 26,,, | Performed by: RADIOLOGY

## 2024-03-08 PROCEDURE — 71271 CT THORAX LUNG CANCER SCR C-: CPT | Mod: TC

## 2024-03-08 PROCEDURE — 76705 ECHO EXAM OF ABDOMEN: CPT | Mod: 26,,, | Performed by: RADIOLOGY

## 2024-03-08 PROCEDURE — 76705 ECHO EXAM OF ABDOMEN: CPT | Mod: TC

## 2024-03-11 ENCOUNTER — PATIENT MESSAGE (OUTPATIENT)
Dept: FAMILY MEDICINE | Facility: CLINIC | Age: 69
End: 2024-03-11
Payer: MEDICARE

## 2024-03-11 ENCOUNTER — TELEPHONE (OUTPATIENT)
Dept: CARDIOLOGY | Facility: CLINIC | Age: 69
End: 2024-03-11
Payer: MEDICARE

## 2024-03-11 DIAGNOSIS — R60.0 BILATERAL LOWER EXTREMITY EDEMA: Primary | ICD-10-CM

## 2024-03-11 RX ORDER — FUROSEMIDE 20 MG/1
20 TABLET ORAL DAILY
Qty: 90 TABLET | Refills: 1 | Status: SHIPPED | OUTPATIENT
Start: 2024-03-11

## 2024-03-11 NOTE — TELEPHONE ENCOUNTER
Went over results. Pt states his PCP is looking into it further.     ----- Message from Juanito He MD sent at 3/8/2024  5:12 PM CST -----  Have fatty liver disease, advise to reduce alcohol intake. Thanks,    Dr. He    ----- Message -----  From: Interface, Rad Results In  Sent: 3/8/2024   2:20 PM CST  To: Juanito He MD

## 2024-03-14 ENCOUNTER — OFFICE VISIT (OUTPATIENT)
Dept: FAMILY MEDICINE | Facility: CLINIC | Age: 69
End: 2024-03-14
Payer: MEDICARE

## 2024-03-14 DIAGNOSIS — R22.42 LOCALIZED SWELLING, MASS AND LUMP, LEFT LOWER LIMB: ICD-10-CM

## 2024-03-14 DIAGNOSIS — M79.89 LEFT LEG SWELLING: Primary | ICD-10-CM

## 2024-03-14 PROCEDURE — 99214 OFFICE O/P EST MOD 30 MIN: CPT | Mod: 95,,, | Performed by: STUDENT IN AN ORGANIZED HEALTH CARE EDUCATION/TRAINING PROGRAM

## 2024-03-14 PROCEDURE — 4010F ACE/ARB THERAPY RXD/TAKEN: CPT | Mod: CPTII,95,, | Performed by: STUDENT IN AN ORGANIZED HEALTH CARE EDUCATION/TRAINING PROGRAM

## 2024-03-14 RX ORDER — COLCHICINE 0.6 MG/1
TABLET ORAL
Qty: 14 TABLET | Refills: 2 | Status: SHIPPED | OUTPATIENT
Start: 2024-03-14

## 2024-03-14 NOTE — PROGRESS NOTES
[facility]    [facility]  [address] [address]  [place] [address]    Subjective     Patient ID: Gregorio Guerra is a 68 y.o. male who is accessing care through a virtual telemed visit.    Chief Complaint: Foot pain    Just a few days ago his left foot started swelling. No trauma or mechanism of injury. He does have a h/o gout and this happened about two months in the same foot. It spontaneously went away. Right now it's painful and erythematous. Swelling is going up all the way up to the mid calf. Can't take NSAIDs due to blood thinner    Sinus issues - started over a year ago.    ROS negative unless stated above       Objective     No vitals obtained d/t telemed visit    Limited physical exam d/t telemed visit but what could be obtained is listed below:    General: NAD, appears stated age, not ill appearing  Respiratory: Not in respiratory distress, no tachypnea  Psych: AOx3, normal affect, thought pattern, and judgement    Wt Readings from Last 3 Encounters:   03/01/24 1104 118.8 kg (261 lb 14.4 oz)   02/29/24 1518 122.7 kg (270 lb 9.6 oz)   02/27/24 0815 97.1 kg (214 lb)        Current Outpatient Medications   Medication Instructions    albuterol (VENTOLIN HFA) 90 mcg/actuation inhaler 2 puffs, Inhalation, Every 6 hours PRN, Rescue    apixaban (ELIQUIS) 5 mg, Oral, 2 times daily    colchicine (COLCRYS) 0.6 mg tablet Take 2 tablets at first sign of flare, then 0.6mg one hour after. On subsequent days, take 0.6mg daily until flare resolves    cyclobenzaprine (FLEXERIL) 10 MG tablet No dose, route, or frequency recorded.    furosemide (LASIX) 20 mg, Oral, Daily    gabapentin (NEURONTIN) 300 MG capsule No dose, route, or frequency recorded.    HYDROcodone-acetaminophen (NORCO)  mg per tablet Oral    ipratropium (ATROVENT) 500 mcg, Nebulization, Every 6 hours PRN, Rescue    losartan (COZAAR) 25 mg, Oral, Daily    metoprolol succinate (TOPROL-XL) 50 mg,  Oral, Daily    oxyCODONE-acetaminophen (PERCOCET)  mg per tablet 1 tablet, Oral    oxyCODONE-acetaminophen (PERCOCET) 7.5-325 mg per tablet 1 tablet, Oral    rosuvastatin (CRESTOR) 20 mg, Oral, Daily    sildenafiL (VIAGRA) 50 MG tablet TAKE 1 TABLET BY MOUTH AS NEEDED FOR ERECTILE DYSFUNCTION    spironolactone (ALDACTONE) 25 mg, Oral, Daily    varenicline (CHANTIX) 1 mg, Oral, 2 times daily           Assessment and Plan     1. Left leg swelling  -     US Lower Extremity Veins Left; Future; Expected date: 03/14/2024  -     colchicine (COLCRYS) 0.6 mg tablet; Take 2 tablets at first sign of flare, then 0.6mg one hour after. On subsequent days, take 0.6mg daily until flare resolves  Dispense: 14 tablet; Refill: 2    2. Localized swelling, mass and lump, left lower limb  -     US Lower Extremity Veins Left; Future; Expected date: 03/14/2024        For unilateral leg swelling, ordering DVT u/s to rule out clot    Can't rule out Gout though, will treat w/ colchicine in hopes of that helping the problem    Gave strict ED precautions for s/s of PE, namely shortness of breath, hemoptysis, chest pain, and tachycardia    Flonase and zyrtec for sinus issues,     RTC at next schedule visit or if symptoms dont improve            I encouraged the patient to take all medications as prescribed and to keep follow up appointments with their providers. Patient stated they had no other concerns. Questions were invited and answered. Follow up sooner if needed. ED precautions given.    Girish Roberts MD  03/14/2024 4:03 PM        The patient location is: [place]  The chief complaint leading to consultation is: leg swelling    Visit type: audiovisual    Face to Face time with patient: 12mins  15 minutes of total time spent on the encounter, which includes face to face time and non-face to face time preparing to see the patient (eg, review of tests), Obtaining and/or reviewing separately obtained history, Documenting clinical information  in the electronic or other health record, Independently interpreting results (not separately reported) and communicating results to the patient/family/caregiver, or Care coordination (not separately reported).     Each patient to whom he or she provides medical services by telemedicine is: (1) informed of the relationship between the physician and patient and the respective role of any other health care provider with respect to management of the patient; and (2) notified that he or she may decline to receive medical services by telemedicine and may withdraw from such care at any time.

## 2024-03-15 ENCOUNTER — HOSPITAL ENCOUNTER (OUTPATIENT)
Dept: RADIOLOGY | Facility: HOSPITAL | Age: 69
Discharge: HOME | End: 2024-03-15
Attending: STUDENT IN AN ORGANIZED HEALTH CARE EDUCATION/TRAINING PROGRAM
Payer: MEDICARE

## 2024-03-15 DIAGNOSIS — M79.89 LEFT LEG SWELLING: ICD-10-CM

## 2024-03-15 DIAGNOSIS — R22.42 LOCALIZED SWELLING, MASS AND LUMP, LEFT LOWER LIMB: ICD-10-CM

## 2024-03-15 PROCEDURE — 93971 EXTREMITY STUDY: CPT | Mod: 26,LT,, | Performed by: RADIOLOGY

## 2024-03-15 PROCEDURE — 93971 EXTREMITY STUDY: CPT | Mod: TC,LT

## 2024-04-02 ENCOUNTER — HOSPITAL ENCOUNTER (OUTPATIENT)
Facility: HOSPITAL | Age: 69
Discharge: HOME | End: 2024-04-02
Attending: ANESTHESIOLOGY | Admitting: ANESTHESIOLOGY
Payer: MEDICARE

## 2024-04-02 VITALS
HEART RATE: 63 BPM | RESPIRATION RATE: 16 BRPM | HEIGHT: 73 IN | WEIGHT: 260 LBS | OXYGEN SATURATION: 95 % | DIASTOLIC BLOOD PRESSURE: 60 MMHG | TEMPERATURE: 97 F | SYSTOLIC BLOOD PRESSURE: 104 MMHG | BODY MASS INDEX: 34.46 KG/M2

## 2024-04-02 DIAGNOSIS — M47.896 OTHER SPONDYLOSIS, LUMBAR REGION: Primary | ICD-10-CM

## 2024-04-02 PROCEDURE — 25000003 PHARM REV CODE 250: Performed by: ANESTHESIOLOGY

## 2024-04-02 PROCEDURE — 63600175 PHARM REV CODE 636 W HCPCS: Mod: JZ,JG | Performed by: ANESTHESIOLOGY

## 2024-04-02 PROCEDURE — 64493 INJ PARAVERT F JNT L/S 1 LEV: CPT | Mod: 50 | Performed by: ANESTHESIOLOGY

## 2024-04-02 PROCEDURE — 64494 INJ PARAVERT F JNT L/S 2 LEV: CPT | Mod: 50 | Performed by: ANESTHESIOLOGY

## 2024-04-02 PROCEDURE — 99152 MOD SED SAME PHYS/QHP 5/>YRS: CPT | Performed by: ANESTHESIOLOGY

## 2024-04-02 RX ORDER — BUPIVACAINE HYDROCHLORIDE 5 MG/ML
INJECTION, SOLUTION EPIDURAL; INTRACAUDAL
Status: DISCONTINUED | OUTPATIENT
Start: 2024-04-02 | End: 2024-04-02 | Stop reason: HOSPADM

## 2024-04-02 RX ORDER — LIDOCAINE HYDROCHLORIDE 10 MG/ML
INJECTION INFILTRATION; PERINEURAL
Status: DISCONTINUED | OUTPATIENT
Start: 2024-04-02 | End: 2024-04-02 | Stop reason: HOSPADM

## 2024-04-02 RX ORDER — MIDAZOLAM HYDROCHLORIDE 5 MG/ML
INJECTION INTRAMUSCULAR; INTRAVENOUS
Status: DISCONTINUED | OUTPATIENT
Start: 2024-04-02 | End: 2024-04-02 | Stop reason: HOSPADM

## 2024-04-02 RX ORDER — SODIUM CHLORIDE, SODIUM LACTATE, POTASSIUM CHLORIDE, CALCIUM CHLORIDE 600; 310; 30; 20 MG/100ML; MG/100ML; MG/100ML; MG/100ML
INJECTION, SOLUTION INTRAVENOUS CONTINUOUS
Status: DISCONTINUED | OUTPATIENT
Start: 2024-04-02 | End: 2024-04-02 | Stop reason: HOSPADM

## 2024-04-02 NOTE — DISCHARGE SUMMARY
[facility]  Discharge Note  Short Stay    Procedure(s) (LRB):  LUMBAR MBB BILATERAL L4-5, L5-S1 (Bilateral)      OUTCOME: Patient tolerated treatment/procedure well without complication and is now ready for discharge.    DISPOSITION: Home or Self Care    FINAL DIAGNOSIS:  Other spondylosis, lumbar    FOLLOWUP: In clinic    DISCHARGE INSTRUCTIONS:    Discharge Procedure Orders   Diet general     Call MD for:  temperature >100.4     Call MD for:  persistent nausea and vomiting     Call MD for:  severe uncontrolled pain     Call MD for:  difficulty breathing, headache or visual disturbances     Call MD for:  redness, tenderness, or signs of infection (pain, swelling, redness, odor or green/yellow discharge around incision site)     Call MD for:  hives     Call MD for:  persistent dizziness or light-headedness     Call MD for:  extreme fatigue        TIME SPENT ON DISCHARGE: 30 minutes

## 2024-04-02 NOTE — OP NOTE
PROCEDURE DATE: 4/2/2024    PROCEDURE:  Bilateral L3, L4, and L5 medial branch nerve blocks    DIAGNOSIS:  Other lumbar spondylosis    Post Op diagnosis: Same    PHYSICIAN: Lita Billings MD    MEDICATIONS INJECTED: 0.5% bupivicaine, 0.5 ml at each level    SEDATION MEDICATIONS: RN IV sedation    LOCAL ANESTHETIC USED: 1% lidocaine, 2 mL used at each level    ESTIMATED BLOOD LOSS:  none    COMPLICATIONS:  none    TECHNIQUE: A time out was taken to identify the patient, procedure and side of the procedure. The patient was placed in a prone position, then prepped and draped in the usual sterile fashion using ChloraPrep and sterile towels.  The levels were determined under fluoroscopic guidance and then marked.  A 25-gauge 3.5 inch needle was introduced to the anatomic location of the L3, L4, and L5 medial branch nerves on the bilateral side. The above medication was then injected. The patient tolerated the procedure well.     The patient was monitored after the procedure. Patient was given pain diary to record pain levels at home.     If found to have greater than a 50% recovery and so will be scheduled for a radiofrequency ablation of the corresponding nerves.  Patient was given post procedure and discharge instructions to follow at home.  The patient was discharged in a stable condition.

## 2024-04-02 NOTE — H&P
"FOLLOW UP NOTE:     CHIEF COMPLAINT: back pain    INTERVAL HISTORY OF PRESENT ILLNESS: Gregorio Guerra is a 68 y.o. male who presents for LUMBAR MBB BILATERAL L4-5, L5-S1 . The patient denies of any significant changes in his health since his last appointment. The patient also denies of any changes in the character of his pain since his last appointment.     ROS:  Review of Systems   Constitutional:  Negative for chills and fever.   HENT:  Negative for tinnitus.    Eyes:  Negative for visual disturbance.   Respiratory:  Negative for shortness of breath.    Cardiovascular:  Negative for chest pain.   Gastrointestinal:  Negative for nausea and vomiting.   Genitourinary:  Negative for difficulty urinating.   Musculoskeletal:  Positive for arthralgias and back pain.   Skin:  Negative for rash.   Allergic/Immunologic: Negative for immunocompromised state.   Neurological:  Negative for syncope.   Hematological:  Does not bruise/bleed easily.   Psychiatric/Behavioral:  Negative for suicidal ideas.         MEDICAL, SURGICAL, FAMILY, SOCIAL HX: reviewed    MEDICATIONS/ALLERGIES: reviewed    PHYSICAL EXAM:    VITALS: Vitals reviewed.   Vitals:    04/02/24 0649   BP: (!) 117/59   Pulse: 60   Resp: 18   Temp: 97.7 °F (36.5 °C)   TempSrc: Temporal   SpO2: (!) 94%   Weight: 117.9 kg (260 lb)   Height: 6' 1" (1.854 m)       Physical Exam  Vitals and nursing note reviewed.   Constitutional:       Appearance: Normal appearance. He is not toxic-appearing or diaphoretic.   HENT:      Head: Normocephalic and atraumatic.   Eyes:      General:         Right eye: No discharge.         Left eye: No discharge.      Extraocular Movements: Extraocular movements intact.      Conjunctiva/sclera: Conjunctivae normal.   Cardiovascular:      Rate and Rhythm: Normal rate.   Pulmonary:      Effort: Pulmonary effort is normal. No respiratory distress.      Breath sounds: Normal breath sounds.   Abdominal:      Palpations: Abdomen is soft. "   Skin:     General: Skin is warm and dry.      Findings: No rash.   Neurological:      Mental Status: He is alert and oriented to person, place, and time.   Psychiatric:         Mood and Affect: Mood and affect normal.         Cognition and Memory: Memory normal.         Judgment: Judgment normal.          EXTREMITIES:    Gen: No cyanosis, edema, varicosities, or tenderness to palpation BLE   Skin: Warm, pink, dry, no rashes, no lesions BLE   Strength: 5/5 motor strength BLE   ROM: hips, knees and ankles without pain or instability.     NEUROLOGICAL:    Gen: No clonus or spasticity.   Gait: Normal without antalgic lean   DTR's: 2+ in bilateral patellar, and ankle   BABINSKI: Absent bilaterally  Sensory: Intact to light touch and proprioception BLE    ASSESSMENT: Gregorio Guerra is a 68 y.o. male who presents for LUMBAR MBB BILATERAL L4-5, L5-S1 .     PLAN:  Proceed with LUMBAR MBB BILATERAL L4-5, L5-S1  as previously discussed.    This patient has been cleared for surgery in an ambulatory surgical facility    ASA 3,  Mallampatti Score 3  No history of anesthetic complications  Plan for RN IV sedation    Lita Billings MD  Pain Management

## 2024-04-02 NOTE — DISCHARGE INSTRUCTIONS

## 2024-04-03 ENCOUNTER — PATIENT MESSAGE (OUTPATIENT)
Dept: ADMINISTRATIVE | Facility: HOSPITAL | Age: 69
End: 2024-04-03
Payer: MEDICARE

## 2024-05-21 ENCOUNTER — HOSPITAL ENCOUNTER (OUTPATIENT)
Facility: HOSPITAL | Age: 69
Discharge: HOME | End: 2024-05-21
Attending: ANESTHESIOLOGY | Admitting: ANESTHESIOLOGY
Payer: MEDICARE

## 2024-05-21 VITALS
HEART RATE: 66 BPM | TEMPERATURE: 97 F | RESPIRATION RATE: 12 BRPM | HEIGHT: 73 IN | WEIGHT: 260 LBS | DIASTOLIC BLOOD PRESSURE: 61 MMHG | SYSTOLIC BLOOD PRESSURE: 124 MMHG | OXYGEN SATURATION: 97 % | BODY MASS INDEX: 34.46 KG/M2

## 2024-05-21 DIAGNOSIS — M47.896 OTHER SPONDYLOSIS, LUMBAR REGION: Primary | ICD-10-CM

## 2024-05-21 PROCEDURE — 64636 DESTROY L/S FACET JNT ADDL: CPT | Mod: RT | Performed by: ANESTHESIOLOGY

## 2024-05-21 PROCEDURE — 99152 MOD SED SAME PHYS/QHP 5/>YRS: CPT | Performed by: ANESTHESIOLOGY

## 2024-05-21 PROCEDURE — 63600175 PHARM REV CODE 636 W HCPCS: Performed by: ANESTHESIOLOGY

## 2024-05-21 PROCEDURE — 25000003 PHARM REV CODE 250: Performed by: ANESTHESIOLOGY

## 2024-05-21 PROCEDURE — 64635 DESTROY LUMB/SAC FACET JNT: CPT | Mod: RT | Performed by: ANESTHESIOLOGY

## 2024-05-21 RX ORDER — LIDOCAINE HYDROCHLORIDE 20 MG/ML
INJECTION, SOLUTION INFILTRATION; PERINEURAL
Status: DISCONTINUED | OUTPATIENT
Start: 2024-05-21 | End: 2024-05-21 | Stop reason: HOSPADM

## 2024-05-21 RX ORDER — MIDAZOLAM HYDROCHLORIDE 5 MG/ML
INJECTION INTRAMUSCULAR; INTRAVENOUS
Status: DISCONTINUED | OUTPATIENT
Start: 2024-05-21 | End: 2024-05-21 | Stop reason: HOSPADM

## 2024-05-21 RX ORDER — BUPIVACAINE HYDROCHLORIDE 5 MG/ML
INJECTION, SOLUTION EPIDURAL; INTRACAUDAL
Status: DISCONTINUED | OUTPATIENT
Start: 2024-05-21 | End: 2024-05-21 | Stop reason: HOSPADM

## 2024-05-21 RX ORDER — SODIUM CHLORIDE, SODIUM LACTATE, POTASSIUM CHLORIDE, CALCIUM CHLORIDE 600; 310; 30; 20 MG/100ML; MG/100ML; MG/100ML; MG/100ML
INJECTION, SOLUTION INTRAVENOUS CONTINUOUS
Status: DISCONTINUED | OUTPATIENT
Start: 2024-05-21 | End: 2024-05-21 | Stop reason: HOSPADM

## 2024-05-21 RX ORDER — FENTANYL CITRATE 50 UG/ML
INJECTION, SOLUTION INTRAMUSCULAR; INTRAVENOUS
Status: DISCONTINUED | OUTPATIENT
Start: 2024-05-21 | End: 2024-05-21 | Stop reason: HOSPADM

## 2024-05-21 RX ORDER — DEXAMETHASONE SODIUM PHOSPHATE 4 MG/ML
INJECTION, SOLUTION INTRA-ARTICULAR; INTRALESIONAL; INTRAMUSCULAR; INTRAVENOUS; SOFT TISSUE
Status: DISCONTINUED | OUTPATIENT
Start: 2024-05-21 | End: 2024-05-21 | Stop reason: HOSPADM

## 2024-05-21 RX ADMIN — SODIUM CHLORIDE, POTASSIUM CHLORIDE, SODIUM LACTATE AND CALCIUM CHLORIDE: 600; 310; 30; 20 INJECTION, SOLUTION INTRAVENOUS at 08:05

## 2024-05-21 NOTE — DISCHARGE SUMMARY
[facility]  Discharge Note  Short Stay    Procedure(s) (LRB):  LUMBAR RFA RIGHT L4-5, L5-S1 (Right)      OUTCOME: Patient tolerated treatment/procedure well without complication and is now ready for discharge.    DISPOSITION: Home or Self Care    FINAL DIAGNOSIS:  Other spondylosis, lumbar    FOLLOWUP: In clinic    DISCHARGE INSTRUCTIONS:    Discharge Procedure Orders   Diet general     Call MD for:  temperature >100.4     Call MD for:  persistent nausea and vomiting     Call MD for:  severe uncontrolled pain     Call MD for:  difficulty breathing, headache or visual disturbances     Call MD for:  redness, tenderness, or signs of infection (pain, swelling, redness, odor or green/yellow discharge around incision site)     Call MD for:  hives     Call MD for:  persistent dizziness or light-headedness     Call MD for:  extreme fatigue         Clinical Reference Documents Added to Patient Instructions         Document    RADIOFREQUENCY ABLATION FOR PAIN (ENGLISH)            TIME SPENT ON DISCHARGE: 30 minutes

## 2024-05-21 NOTE — OP NOTE
PROCEDURE DATE: 5/21/2024    PROCEDURE:  Radiofrequency ablation of the L3, L4, and L5 medial branch nerves on the right-side utilizing fluoroscopy    DIAGNOSIS:  Other lumbar spondylosis  Post op Diagnosis: Same    PHYSICIAN: Lita Billings MD    MEDICATIONS INJECTED:  From a mixture of 2 ml of 0.5% bupivicaine and 10 mg of dexamethasone,  1ml of this solution was injected at each level.    LOCAL ANESTHETIC USED: Lidocaine 1%, 2 ml given at each site.    SEDATION MEDICATIONS: RN IV sedation    ESTIMATED BLOOD LOSS:  none    COMPLICATIONS:  none    TECHNIQUE:  A time out was taken to identify patient and procedure side prior to starting the procedure. Laying in a prone position, the patient was prepped and draped in the usual sterile fashion using ChloraPrep and sterile towels.  The levels were determined under fluoroscopic guidance and then marked.  Local anesthetic was given by raising a wheal at the skin over each site and then infiltrated approximately 2cm deeper.  A 20-gauge  100 mm RF needle was introduced to the anatomic location of the right L3, L4, and L5 medial branch nerves. Motor stimulation up to 2 Volts at each level confirmed no motor nerve involvement.  Impedance was less than 800 ohms at each level.  1ml of 2% lidocaine was instilled prior to lesioning.  Ablation was performed per level utilizing radiofrequency generator 80°C for 105 seconds.  Needles were then rotated 90° and a second lesion was performed.  The above noted medication was then injected slowly. The patient tolerated the procedure well.     The patient was monitored after the procedure.  Patient was given post procedure and discharge instructions to follow at home.  The patient was discharged in a stable condition

## 2024-05-21 NOTE — H&P
"FOLLOW UP NOTE:     CHIEF COMPLAINT: back pain    INTERVAL HISTORY OF PRESENT ILLNESS: Gregorio Guerra is a 69 y.o. male who presents for LUMBAR RFA RIGHT L4-5, L5-S1 . The patient denies of any significant changes in his health since his last appointment. The patient also denies of any changes in the character of his pain since his last appointment.     ROS:  Review of Systems   Constitutional:  Negative for chills and fever.   HENT:  Negative for tinnitus.    Eyes:  Negative for visual disturbance.   Respiratory:  Negative for shortness of breath.    Cardiovascular:  Negative for chest pain.   Gastrointestinal:  Negative for nausea and vomiting.   Genitourinary:  Negative for difficulty urinating.   Musculoskeletal:  Positive for arthralgias and back pain.   Skin:  Negative for rash.   Allergic/Immunologic: Negative for immunocompromised state.   Neurological:  Negative for syncope.   Hematological:  Does not bruise/bleed easily.   Psychiatric/Behavioral:  Negative for suicidal ideas.         MEDICAL, SURGICAL, FAMILY, SOCIAL HX: reviewed    MEDICATIONS/ALLERGIES: reviewed    PHYSICAL EXAM:    VITALS: Vitals reviewed.   Vitals:    05/21/24 0838   BP: (!) 154/81   Pulse: 68   Resp: 14   Temp: 97.6 °F (36.4 °C)   TempSrc: Temporal   SpO2: 96%   Weight: 117.9 kg (260 lb)   Height: 6' 1" (1.854 m)       Physical Exam  Vitals and nursing note reviewed.   Constitutional:       Appearance: Normal appearance. He is not toxic-appearing or diaphoretic.   HENT:      Head: Normocephalic and atraumatic.   Eyes:      General:         Right eye: No discharge.         Left eye: No discharge.      Extraocular Movements: Extraocular movements intact.      Conjunctiva/sclera: Conjunctivae normal.   Cardiovascular:      Rate and Rhythm: Normal rate.   Pulmonary:      Effort: Pulmonary effort is normal. No respiratory distress.      Breath sounds: Normal breath sounds.   Abdominal:      Palpations: Abdomen is soft.   Skin:     " General: Skin is warm and dry.      Findings: No rash.   Neurological:      Mental Status: He is alert and oriented to person, place, and time.   Psychiatric:         Mood and Affect: Mood and affect normal.         Cognition and Memory: Memory normal.         Judgment: Judgment normal.          EXTREMITIES:    Gen: No cyanosis, edema, varicosities, or tenderness to palpation BLE   Skin: Warm, pink, dry, no rashes, no lesions BLE   Strength: 5/5 motor strength BLE   ROM: hips, knees and ankles without pain or instability.     NEUROLOGICAL:    Gen: No clonus or spasticity.   Gait: Normal without antalgic lean   DTR's: 2+ in bilateral patellar, and ankle   BABINSKI: Absent bilaterally  Sensory: Intact to light touch and proprioception BLE    ASSESSMENT: Gregorio Guerra is a 69 y.o. male who presents for LUMBAR RFA RIGHT L4-5, L5-S1 .     PLAN:  Proceed with LUMBAR RFA RIGHT L4-5, L5-S1  as previously discussed.    This patient has been cleared for surgery in an ambulatory surgical facility    ASA 3,  Mallampatti Score 3  No history of anesthetic complications  Plan for RN IV sedation    Lita Billings MD  Pain Management

## 2024-05-23 ENCOUNTER — TELEPHONE (OUTPATIENT)
Dept: SMOKING CESSATION | Facility: CLINIC | Age: 69
End: 2024-05-23
Payer: MEDICARE

## 2024-05-30 ENCOUNTER — OFFICE VISIT (OUTPATIENT)
Dept: CARDIOLOGY | Facility: CLINIC | Age: 69
End: 2024-05-30
Payer: MEDICARE

## 2024-05-30 VITALS
WEIGHT: 258 LBS | OXYGEN SATURATION: 95 % | SYSTOLIC BLOOD PRESSURE: 152 MMHG | DIASTOLIC BLOOD PRESSURE: 94 MMHG | HEIGHT: 73 IN | BODY MASS INDEX: 34.19 KG/M2 | HEART RATE: 97 BPM

## 2024-05-30 DIAGNOSIS — G47.33 OSA (OBSTRUCTIVE SLEEP APNEA): ICD-10-CM

## 2024-05-30 DIAGNOSIS — K76.0 FATTY LIVER: ICD-10-CM

## 2024-05-30 DIAGNOSIS — F10.10 EXCESSIVE DRINKING ALCOHOL: ICD-10-CM

## 2024-05-30 DIAGNOSIS — I11.0 LVH (LEFT VENTRICULAR HYPERTROPHY) DUE TO HYPERTENSIVE DISEASE, WITH HEART FAILURE: ICD-10-CM

## 2024-05-30 DIAGNOSIS — Z79.01 CURRENT USE OF LONG TERM ANTICOAGULATION: ICD-10-CM

## 2024-05-30 DIAGNOSIS — I50.32 CHRONIC HEART FAILURE WITH PRESERVED EJECTION FRACTION: ICD-10-CM

## 2024-05-30 DIAGNOSIS — Z91.89 AT RISK FOR CARDIOVASCULAR EVENT: Primary | ICD-10-CM

## 2024-05-30 DIAGNOSIS — E66.09 CLASS 1 OBESITY DUE TO EXCESS CALORIES WITH SERIOUS COMORBIDITY AND BODY MASS INDEX (BMI) OF 34.0 TO 34.9 IN ADULT: ICD-10-CM

## 2024-05-30 DIAGNOSIS — E78.2 MIXED HYPERLIPIDEMIA: ICD-10-CM

## 2024-05-30 DIAGNOSIS — F11.20 UNCOMPLICATED OPIOID DEPENDENCE: ICD-10-CM

## 2024-05-30 DIAGNOSIS — Z87.891 PERSONAL HISTORY OF NICOTINE DEPENDENCE: ICD-10-CM

## 2024-05-30 DIAGNOSIS — Z91.89 SEDENTARY LIFESTYLE: ICD-10-CM

## 2024-05-30 DIAGNOSIS — I10 PRIMARY HYPERTENSION: ICD-10-CM

## 2024-05-30 DIAGNOSIS — I48.0 PAROXYSMAL ATRIAL FIBRILLATION: ICD-10-CM

## 2024-05-30 PROBLEM — E66.811 CLASS 1 OBESITY DUE TO EXCESS CALORIES WITH SERIOUS COMORBIDITY AND BODY MASS INDEX (BMI) OF 34.0 TO 34.9 IN ADULT: Status: ACTIVE | Noted: 2021-04-08

## 2024-05-30 PROCEDURE — 99215 OFFICE O/P EST HI 40 MIN: CPT | Mod: S$GLB,,, | Performed by: INTERNAL MEDICINE

## 2024-05-30 PROCEDURE — 3079F DIAST BP 80-89 MM HG: CPT | Mod: CPTII,S$GLB,, | Performed by: INTERNAL MEDICINE

## 2024-05-30 PROCEDURE — 4010F ACE/ARB THERAPY RXD/TAKEN: CPT | Mod: CPTII,S$GLB,, | Performed by: INTERNAL MEDICINE

## 2024-05-30 PROCEDURE — 1101F PT FALLS ASSESS-DOCD LE1/YR: CPT | Mod: CPTII,S$GLB,, | Performed by: INTERNAL MEDICINE

## 2024-05-30 PROCEDURE — 93000 ELECTROCARDIOGRAM COMPLETE: CPT | Mod: S$GLB,,, | Performed by: INTERNAL MEDICINE

## 2024-05-30 PROCEDURE — 1159F MED LIST DOCD IN RCRD: CPT | Mod: CPTII,S$GLB,, | Performed by: INTERNAL MEDICINE

## 2024-05-30 PROCEDURE — 3288F FALL RISK ASSESSMENT DOCD: CPT | Mod: CPTII,S$GLB,, | Performed by: INTERNAL MEDICINE

## 2024-05-30 PROCEDURE — 3008F BODY MASS INDEX DOCD: CPT | Mod: CPTII,S$GLB,, | Performed by: INTERNAL MEDICINE

## 2024-05-30 PROCEDURE — 3077F SYST BP >= 140 MM HG: CPT | Mod: CPTII,S$GLB,, | Performed by: INTERNAL MEDICINE

## 2024-05-30 PROCEDURE — 1125F AMNT PAIN NOTED PAIN PRSNT: CPT | Mod: CPTII,S$GLB,, | Performed by: INTERNAL MEDICINE

## 2024-05-30 PROCEDURE — 99999 PR PBB SHADOW E&M-EST. PATIENT-LVL IV: CPT | Mod: PBBFAC,,, | Performed by: INTERNAL MEDICINE

## 2024-05-30 RX ORDER — SEMAGLUTIDE 0.68 MG/ML
0.25 INJECTION, SOLUTION SUBCUTANEOUS
Qty: 3 ML | Refills: 3 | Status: SHIPPED | OUTPATIENT
Start: 2024-05-30

## 2024-05-30 NOTE — PROGRESS NOTES
"Subjective:    Patient ID:  Gregorio Guerra is a 69 y.o. male who presents for evaluation of Follow-up (3 month)  Came on own for PAF, post DCC 12/13/2023, smoker down to 1 cpd, high ASCVD 10-year event risk of over 27% not on statin, 3-months review.  PCP: Girish Roberts MD    Prior cardiologist: Leonel Velasco MD , last seen 2/2023  Pain: Lita Billings MD, anticipating LUMBAR RFA LEFT L4-5, L5-S1 for 6/4/2024   Lives with wife, Scar, outdoor smoker  Retired [employer],     Social Determinate of Health: Do you have any problem with the following: none  Health Literacy (understanding): high  Vaccination: up to date yes, covid vaccine yes, covid infection 2022, no lasting effects  Activities: no working out, fatigue all the time for the past 2 years. Just got a boat, able to work on the boat, limited by CLBP  Nicotine: smoker  (1 pp week x 50 yrs), started age 13, now 1 cpd on Chantex,   Alcohol: 80 proof liquor, max 4 shots in any 24 hours, 3 times weekly.  Illicit Drugs: now and then THC gummies, Rx opioid tid for over 2 years.  Cardiac Symptoms: fatigue, felt lot better after successful DCC  Home BP: no  Medication Compliance: yes, do not skipped Eliquis now  Diet: Regular, use salt   Caffeine: Coffee 1/2 cup and a Coke per day  Labs:   Lab Results   Component Value Date    TSH 2.930 11/09/2023      No results found for: "LABA1C", "HGBA1C"    Lab Results   Component Value Date    WBC 8.43 11/09/2023    HGB 17.5 11/09/2023    HCT 50.9 11/09/2023     (H) 11/09/2023     (L) 11/09/2023       CMP  Sodium   Date Value Ref Range Status   11/09/2023 137 136 - 145 mmol/L Final   06/16/2021 137 136 - 145 mmol/L Final   06/15/2021 136 136 - 145 mmol/L Final   10/17/2018 138 134 - 144 mmol/L      Potassium   Date Value Ref Range Status   11/09/2023 4.6 3.5 - 5.1 mmol/L Final   06/16/2021 3.9 3.5 - 5.1 mmol/L Final   06/15/2021 3.8 3.5 - 5.1 mmol/L Final     Chloride "   Date Value Ref Range Status   11/09/2023 104 95 - 110 mmol/L Final   06/16/2021 103 95 - 110 mmol/L Final   06/15/2021 104 95 - 110 mmol/L Final   10/17/2018 105 98 - 110 mmol/L      CO2   Date Value Ref Range Status   11/09/2023 21 (L) 23 - 29 mmol/L Final   06/16/2021 25 23 - 29 mmol/L Final   06/15/2021 23 23 - 29 mmol/L Final     Glucose   Date Value Ref Range Status   11/09/2023 139 (H) 70 - 110 mg/dL Final   06/16/2021 108 70 - 110 mg/dL Final   06/15/2021 119 (H) 70 - 110 mg/dL Final   10/17/2018 98 70 - 99 mg/dL      BUN   Date Value Ref Range Status   11/09/2023 12 8 - 23 mg/dL Final   06/16/2021 11 8 - 23 mg/dL Final   06/15/2021 14 8 - 23 mg/dL Final     Creatinine   Date Value Ref Range Status   11/09/2023 1.1 0.5 - 1.4 mg/dL Final   06/16/2021 0.9 0.5 - 1.4 mg/dL Final   06/15/2021 0.9 0.5 - 1.4 mg/dL Final   10/17/2018 1.06 0.60 - 1.40 mg/dL      Calcium   Date Value Ref Range Status   11/09/2023 9.3 8.7 - 10.5 mg/dL Final   06/16/2021 8.5 (L) 8.7 - 10.5 mg/dL Final   06/15/2021 8.3 (L) 8.7 - 10.5 mg/dL Final     Total Protein   Date Value Ref Range Status   11/09/2023 7.4 6.0 - 8.4 g/dL Final   06/16/2021 6.6 6.0 - 8.4 g/dL Final   06/15/2021 6.4 6.0 - 8.4 g/dL Final     Albumin   Date Value Ref Range Status   11/09/2023 3.9 3.5 - 5.2 g/dL Final   06/16/2021 3.5 3.5 - 5.2 g/dL Final   06/15/2021 3.3 (L) 3.5 - 5.2 g/dL Final   10/17/2018 4.2 3.1 - 4.7 g/dL      Total Bilirubin   Date Value Ref Range Status   11/09/2023 1.1 (H) 0.1 - 1.0 mg/dL Final     Comment:     For infants and newborns, interpretation of results should be based  on gestational age, weight and in agreement with clinical  observations.    Premature Infant recommended reference ranges:  Up to 24 hours.............<8.0 mg/dL  Up to 48 hours............<12.0 mg/dL  3-5 days..................<15.0 mg/dL  6-29 days.................<15.0 mg/dL     06/16/2021 1.1 (H) 0.1 - 1.0 mg/dL Final     Comment:     For infants and newborns,  interpretation of results should be based  on gestational age, weight and in agreement with clinical  observations.    Premature Infant recommended reference ranges:  Up to 24 hours.............<8.0 mg/dL  Up to 48 hours............<12.0 mg/dL  3-5 days..................<15.0 mg/dL  6-29 days.................<15.0 mg/dL     06/15/2021 1.6 (H) 0.1 - 1.0 mg/dL Final     Comment:     For infants and newborns, interpretation of results should be based  on gestational age, weight and in agreement with clinical  observations.    Premature Infant recommended reference ranges:  Up to 24 hours.............<8.0 mg/dL  Up to 48 hours............<12.0 mg/dL  3-5 days..................<15.0 mg/dL  6-29 days.................<15.0 mg/dL       Alkaline Phosphatase   Date Value Ref Range Status   11/09/2023 82 55 - 135 U/L Final   06/16/2021 70 55 - 135 U/L Final   06/15/2021 69 55 - 135 U/L Final     AST   Date Value Ref Range Status   11/09/2023 46 (H) 10 - 40 U/L Final   06/16/2021 22 10 - 40 U/L Final   06/15/2021 23 10 - 40 U/L Final     ALT   Date Value Ref Range Status   11/09/2023 38 10 - 44 U/L Final   06/16/2021 32 10 - 44 U/L Final   06/15/2021 33 10 - 44 U/L Final     Anion Gap   Date Value Ref Range Status   11/09/2023 12 8 - 16 mmol/L Final   06/16/2021 9 8 - 16 mmol/L Final   06/15/2021 9 8 - 16 mmol/L Final     eGFR   Date Value Ref Range Status   11/09/2023 >60.0 >60 mL/min/1.73 m^2 Final     @labrcntip(troponini)@    BNP   Date Value Ref Range Status   11/09/2023 193 (H) 0 - 99 pg/mL Final     Comment:     Values of less than 100 pg/ml are consistent with non-CHF populations.   }   Lab Results   Component Value Date    CHOL 240 (H) 11/09/2023    CHOL 174 08/16/2005     Lab Results   Component Value Date    HDL 56 11/09/2023    HDL 52.0 08/16/2005     Lab Results   Component Value Date    LDLCALC 157.4 11/09/2023    LDLCALC 106.2 08/16/2005     Lab Results   Component Value Date    TRIG 133 11/09/2023    TRIG 79  "08/16/2005     Lab Results   Component Value Date    CHOLHDL 23.3 11/09/2023    CHOLHDL 29.9 08/16/2005      Last DCC: 12/13/2023  Last Echo: 4/2021  Last stress test: Lexiscan 4/2021  Cardiovascular angiogram: 2018, "no blockages"  ECG: NSR rate 85, low voltage, IRBBB, QTc 485 msec,   Fundoscopic exam: due    In 10/2023:  WM, came on own to establish CV care for PAF with chronic fatigue. Does not feel the AF and active smoking and heavy alcohol use due to chronic pains. Anticipating back procedure on 11/6/2023. Very limited with CLBP along with chronic SOB. Denies any CP had angiogram in 2018 and told of no blockages.    Leonel Velasco MD noted 2/2023 "67 y.o. male is here for follow-up.       1. Chronic heart failure with preserved ejection fraction   2. Paroxysmal atrial fibrillation   3. CAD in native artery   4. Current every day smoker   5. Morbid obesity   6. Essential hypertension   7. Mixed hyperlipidemia     Echo 4/2021 - Interpretation Summary  · The left ventricle is normal in size with concentric remodeling and normal systolic function.  · The estimated ejection fraction is 60%.  · Normal left ventricular diastolic function.  · Normal right ventricular size with normal right ventricular systolic function.  · Mild left atrial enlargement.  · Mild mitral regurgitation.    Lexiscan - Interpretation Summary  ·  Abnormal myocardial perfusion scan.  ·  There is a moderate to severe intensity, moderate to large sized, mostly fixed with some reversibilty defect in the basal to distal inferior wall(s).  ·  There is a second small sized, mild to moderate intensity,  in the apex and anterobasilar wall(s).  ·  The gated perfusion images showed an ejection fraction of 44% post stress. Normal ejection fraction is greater than 53%.  ·  The EKG portion of this study is negative for ischemia.  ·  The patient reported no chest pain during the stress test.    In 2/2024, return for follow up post DCC, positive for REED " and awaiting CPAP titration. Also with high ASCVD 10-year event risk of over 27% not on statin, baseline LDL-C 157. Denies any CV symptoms. Troubled by chronic pains.     HPI comments: in 5/2024, return for review. Started CPAP but problem with dryness and high pressure. Still with occasional AF but still heavy on the alcohol.     Abdominal US - Enlarged liver with fatty infiltration.  Borderline size of the spleen.  No ascites.     Minimally dilated common bile duct, correlate with laboratory values.  Follow-up MRCP can be performed if indicated.    Review of Systems   Constitutional: Positive for weight loss (down 12 lbs from 2/2024.). Negative for diaphoresis, fever and night sweats.   HENT:  Negative for nosebleeds and tinnitus.    Eyes:  Positive for blurred vision and redness. Negative for visual disturbance.   Cardiovascular:  Positive for dyspnea on exertion and palpitations. Negative for chest pain, claudication, cyanosis, irregular heartbeat, leg swelling, near-syncope, orthopnea and paroxysmal nocturnal dyspnea.   Respiratory:  Positive for snoring and wheezing. Negative for cough and sleep disturbances due to breathing.         Cambridge score 9 today a 3, awaken tired, have positive sleep apnea, on CPAP able to do about 4 hours.    Endocrine: Negative for polydipsia and polyuria.   Hematologic/Lymphatic: Bruises/bleeds easily.   Skin:  Positive for itching. Negative for color change, flushing, nail changes, poor wound healing and suspicious lesions.   Musculoskeletal:  Positive for back pain and joint pain. Negative for arthritis, falls, gout, joint swelling, muscle cramps, muscle weakness and myalgias.   Gastrointestinal:  Positive for abdominal pain and nausea. Negative for heartburn, hematemesis, hematochezia and melena.   Neurological:  Negative for disturbances in coordination, excessive daytime sleepiness, dizziness, focal weakness, headaches, light-headedness, loss of balance, numbness, vertigo and  "weakness.   Psychiatric/Behavioral:  Negative for depression and substance abuse. The patient does not have insomnia and is not nervous/anxious.         Objective:    Physical Exam  Constitutional:       Appearance: He is well-developed.      Comments: RA O2 sat 95%  Orthostatic VS: sitting 149/87, standing 152/94   HENT:      Head: Normocephalic.   Eyes:      Conjunctiva/sclera: Conjunctivae normal.      Pupils: Pupils are equal, round, and reactive to light.   Neck:      Thyroid: No thyromegaly.      Vascular: No JVD.      Comments: Circumference 17.5"  Cardiovascular:      Rate and Rhythm: Normal rate. Rhythm irregularly irregular.      Pulses: Intact distal pulses.           Carotid pulses are 1+ on the right side and 1+ on the left side.       Radial pulses are 1+ on the right side and 1+ on the left side.        Dorsalis pedis pulses are 1+ on the right side and 1+ on the left side.        Posterior tibial pulses are 1+ on the right side and 1+ on the left side.      Heart sounds: Heart sounds are distant. No murmur heard.     No friction rub. No gallop.      Comments: Spider veins bilaterally  Pulmonary:      Effort: Pulmonary effort is normal.      Breath sounds: Normal breath sounds. No rales.   Chest:      Chest wall: No tenderness.   Abdominal:      General: Bowel sounds are normal.      Palpations: Abdomen is soft.      Tenderness: There is no abdominal tenderness.      Comments: Waist 48" down to 46.5"   Musculoskeletal:         General: Normal range of motion.      Cervical back: Normal range of motion and neck supple.      Right lower leg: No edema.      Left lower leg: No edema.   Lymphadenopathy:      Cervical: No cervical adenopathy.   Skin:     General: Skin is warm and dry.      Findings: No rash.   Neurological:      Mental Status: He is alert and oriented to person, place, and time.           Assessment:       1. At risk for cardiovascular event, ASCVD 10-year risk 27.2%, 11/2023    2. " Paroxysmal atrial fibrillation    3. Excessive drinking alcohol    4. Uncomplicated opioid dependence, oxycodone 2-3 times daily for over 2 years    5. REED (obstructive sleep apnea), CPAP about 4 hours nightly    6. Sedentary lifestyle    7. Personal history of nicotine dependence    8. Current use of long term anticoagulation    9. Chronic heart failure with preserved ejection fraction    10. Class 1 obesity due to excess calories with serious comorbidity and body mass index (BMI) of 34.0 to 34.9 in adult    11. Primary hypertension, dx 2018    12. LVH (left ventricular hypertrophy) due to hypertensive disease, with heart failure    13. Fatty liver    14. Mixed hyperlipidemia         Plan:       Gregorio was seen today for follow-up.    Diagnoses and all orders for this visit:    At risk for cardiovascular event, ASCVD 10-year risk 27.2%, 11/2023  -     semaglutide (OZEMPIC) 0.25 mg or 0.5 mg (2 mg/3 mL) pen injector; Inject 0.25 mg into the skin every 7 days.    Paroxysmal atrial fibrillation  -     IN OFFICE EKG 12-LEAD (to Muse)    Excessive drinking alcohol    Uncomplicated opioid dependence, oxycodone 2-3 times daily for over 2 years    REED (obstructive sleep apnea), CPAP about 4 hours nightly    Sedentary lifestyle    Personal history of nicotine dependence    Current use of long term anticoagulation    Chronic heart failure with preserved ejection fraction  -     semaglutide (OZEMPIC) 0.25 mg or 0.5 mg (2 mg/3 mL) pen injector; Inject 0.25 mg into the skin every 7 days.    Class 1 obesity due to excess calories with serious comorbidity and body mass index (BMI) of 34.0 to 34.9 in adult  -     semaglutide (OZEMPIC) 0.25 mg or 0.5 mg (2 mg/3 mL) pen injector; Inject 0.25 mg into the skin every 7 days.    Primary hypertension, dx 2018  -     semaglutide (OZEMPIC) 0.25 mg or 0.5 mg (2 mg/3 mL) pen injector; Inject 0.25 mg into the skin every 7 days.    LVH (left ventricular hypertrophy) due to hypertensive  "disease, with heart failure    Fatty liver  -     semaglutide (OZEMPIC) 0.25 mg or 0.5 mg (2 mg/3 mL) pen injector; Inject 0.25 mg into the skin every 7 days.    Mixed hyperlipidemia  -     Comprehensive Metabolic Panel; Future  -     Lipid Panel; Future     - All medical issues reviewed, willing to try Ozempic  - Warning signs of MI and stroke given, if symptoms last more than 5 minutes, stop immediately and call 911, then chew 2-4 low-dose ASA (81 mg).   - Refer to "Chronic Pain" article in Consumer Report 6/2019 issue.   - Need annual fundoscopic examination   - CV status and all medications reviewed, patient acknowledge good understanding.  - Recommend healthy living: no nicotine, moderate alcohol, healthy diet and regular exercise aiming for fitness, restorative sleep and weight control  - Discussed healthy daily limit of 1 oz of pure alcohol in any 24 hours (roughly 2 12-oz beers, 10 oz of wine (8%-12% alcohol), or 1.5 oz of liquor (80 proof)), can not save up.   - Need good exercise program, 4 key elements: 1. Aerobic (walking, swimming, dancing, jogging, biking, etc, 2. Muscle strengthening / resistance exercise, need to do 2-3 times weekly, 3. Stretching daily, good stretch takes a whole  total minute. 4. Balance exercise daily.   - Encourage activities as much as tolerated. Any activity is better than none!   - Instruction for Mediterranean diet and heart healthy exercise given.  - Weigh twice weekly, try to lose 1-2 lbs per week. Target weight loss of 5%-10%.  - Highly recommend 30-60 minutes of exercise / activities daily, can have Sunday off, with 2-3 sessions of muscle strengthening weekly. A  would be very helpful.  - Will follow up in 3 months to check efficacy  - Phone review / encourage use of [facility]       Total time spend including review of record prior to face-to-face visit is 40 minutes. Greater than 50% of the time was spent in counseling and coordination of care. The above " assessment and plan have been discussed at length. Referring provider's note reviewed. Labs and procedure over the last 6 months reviewed. Problem List updated. Asked to bring in all active medications / pills bottles with next visit. Will send note to referring / PCP.

## 2024-05-31 ENCOUNTER — PATIENT MESSAGE (OUTPATIENT)
Dept: FAMILY MEDICINE | Facility: CLINIC | Age: 69
End: 2024-05-31
Payer: MEDICARE

## 2024-06-04 ENCOUNTER — HOSPITAL ENCOUNTER (OUTPATIENT)
Facility: HOSPITAL | Age: 69
Discharge: HOME | End: 2024-06-04
Attending: ANESTHESIOLOGY | Admitting: ANESTHESIOLOGY
Payer: MEDICARE

## 2024-06-04 VITALS
HEIGHT: 73 IN | TEMPERATURE: 98 F | OXYGEN SATURATION: 95 % | BODY MASS INDEX: 33.13 KG/M2 | SYSTOLIC BLOOD PRESSURE: 112 MMHG | RESPIRATION RATE: 15 BRPM | HEART RATE: 70 BPM | DIASTOLIC BLOOD PRESSURE: 61 MMHG | WEIGHT: 250 LBS

## 2024-06-04 DIAGNOSIS — M47.896 OTHER SPONDYLOSIS, LUMBAR REGION: Primary | ICD-10-CM

## 2024-06-04 PROCEDURE — 63600175 PHARM REV CODE 636 W HCPCS: Performed by: ANESTHESIOLOGY

## 2024-06-04 PROCEDURE — 25000003 PHARM REV CODE 250: Performed by: ANESTHESIOLOGY

## 2024-06-04 PROCEDURE — 64635 DESTROY LUMB/SAC FACET JNT: CPT | Mod: LT | Performed by: ANESTHESIOLOGY

## 2024-06-04 PROCEDURE — 99152 MOD SED SAME PHYS/QHP 5/>YRS: CPT | Performed by: ANESTHESIOLOGY

## 2024-06-04 PROCEDURE — 64636 DESTROY L/S FACET JNT ADDL: CPT | Mod: LT | Performed by: ANESTHESIOLOGY

## 2024-06-04 RX ORDER — DEXAMETHASONE SODIUM PHOSPHATE 4 MG/ML
INJECTION, SOLUTION INTRA-ARTICULAR; INTRALESIONAL; INTRAMUSCULAR; INTRAVENOUS; SOFT TISSUE
Status: DISCONTINUED | OUTPATIENT
Start: 2024-06-04 | End: 2024-06-04 | Stop reason: HOSPADM

## 2024-06-04 RX ORDER — BUPIVACAINE HYDROCHLORIDE 5 MG/ML
INJECTION, SOLUTION EPIDURAL; INTRACAUDAL
Status: DISCONTINUED | OUTPATIENT
Start: 2024-06-04 | End: 2024-06-04 | Stop reason: HOSPADM

## 2024-06-04 RX ORDER — SODIUM CHLORIDE, SODIUM LACTATE, POTASSIUM CHLORIDE, CALCIUM CHLORIDE 600; 310; 30; 20 MG/100ML; MG/100ML; MG/100ML; MG/100ML
INJECTION, SOLUTION INTRAVENOUS CONTINUOUS
Status: DISCONTINUED | OUTPATIENT
Start: 2024-06-04 | End: 2024-06-04 | Stop reason: HOSPADM

## 2024-06-04 RX ORDER — MIDAZOLAM HYDROCHLORIDE 5 MG/ML
INJECTION INTRAMUSCULAR; INTRAVENOUS
Status: DISCONTINUED | OUTPATIENT
Start: 2024-06-04 | End: 2024-06-04 | Stop reason: HOSPADM

## 2024-06-04 RX ORDER — LIDOCAINE HYDROCHLORIDE 20 MG/ML
INJECTION, SOLUTION INFILTRATION; PERINEURAL
Status: DISCONTINUED | OUTPATIENT
Start: 2024-06-04 | End: 2024-06-04 | Stop reason: HOSPADM

## 2024-06-04 RX ORDER — FENTANYL CITRATE 50 UG/ML
INJECTION, SOLUTION INTRAMUSCULAR; INTRAVENOUS
Status: DISCONTINUED | OUTPATIENT
Start: 2024-06-04 | End: 2024-06-04 | Stop reason: HOSPADM

## 2024-06-04 NOTE — DISCHARGE SUMMARY
[facility] - [facility]  Discharge Note  Short Stay    Procedure(s) (LRB):  LUMBAR RFA LEFT L4-5, L5-S1 (Left)      OUTCOME: Patient tolerated treatment/procedure well without complication and is now ready for discharge.    DISPOSITION: Home or Self Care    FINAL DIAGNOSIS:  Other spondylosis, lumbar    FOLLOWUP: In clinic    DISCHARGE INSTRUCTIONS:    Discharge Procedure Orders   Diet general     Call MD for:  temperature >100.4     Call MD for:  persistent nausea and vomiting     Call MD for:  severe uncontrolled pain     Call MD for:  difficulty breathing, headache or visual disturbances     Call MD for:  redness, tenderness, or signs of infection (pain, swelling, redness, odor or green/yellow discharge around incision site)     Call MD for:  hives     Call MD for:  persistent dizziness or light-headedness     Call MD for:  extreme fatigue        TIME SPENT ON DISCHARGE: 30 minutes

## 2024-06-04 NOTE — OP NOTE
PROCEDURE DATE: 6/4/2024    PROCEDURE:  Radiofrequency ablation of the L3, L4, and L5 medial branch nerves on the left-side utilizing fluoroscopy    DIAGNOSIS:  Other lumbar spondylosis  Post op Diagnosis: Same    PHYSICIAN: Lita Billings MD    MEDICATIONS INJECTED:  From a mixture of 2 ml of 0.5% bupivicaine and 10 mg of dexamethasone,  1ml of this solution was injected at each level.    LOCAL ANESTHETIC USED: Lidocaine 1%, 2 ml given at each site.    SEDATION MEDICATIONS: RN IV sedation    ESTIMATED BLOOD LOSS:  none    COMPLICATIONS:  none    TECHNIQUE:  A time out was taken to identify patient and procedure side prior to starting the procedure. Laying in a prone position, the patient was prepped and draped in the usual sterile fashion using ChloraPrep and sterile towels.  The levels were determined under fluoroscopic guidance and then marked.  Local anesthetic was given by raising a wheal at the skin over each site and then infiltrated approximately 2cm deeper.  A 20-gauge  100 mm RF needle was introduced to the anatomic location of the left L3, L4, and L5 medial branch nerves. Motor stimulation up to 2 Volts at each level confirmed no motor nerve involvement.  Impedance was less than 800 ohms at each level.  1ml of 2% lidocaine was instilled prior to lesioning.  Ablation was performed per level utilizing radiofrequency generator 80°C for 105 seconds.  Needles were then rotated 90° and a second lesion was performed.  The above noted medication was then injected slowly. The patient tolerated the procedure well.     The patient was monitored after the procedure.  Patient was given post procedure and discharge instructions to follow at home.  The patient was discharged in a stable condition

## 2024-06-04 NOTE — H&P
"FOLLOW UP NOTE:     CHIEF COMPLAINT: LUMBAR RFA LEFT L4-5, L5-S1     INTERVAL HISTORY OF PRESENT ILLNESS: Gregorio Guerra is a 69 y.o. male who presents for LUMBAR RFA LEFT L4-5, L5-S1 . The patient denies of any significant changes in his health since his last appointment. The patient also denies of any changes in the character of his pain since his last appointment.     ROS:  Review of Systems   Constitutional:  Negative for chills and fever.   HENT:  Negative for tinnitus.    Eyes:  Negative for visual disturbance.   Respiratory:  Negative for shortness of breath.    Cardiovascular:  Negative for chest pain.   Gastrointestinal:  Negative for nausea and vomiting.   Genitourinary:  Negative for difficulty urinating.   Musculoskeletal:  Positive for back pain.   Skin:  Negative for rash.   Allergic/Immunologic: Negative for immunocompromised state.   Neurological:  Negative for syncope.   Hematological:  Does not bruise/bleed easily.   Psychiatric/Behavioral:  Negative for suicidal ideas.         MEDICAL, SURGICAL, FAMILY, SOCIAL HX: reviewed    MEDICATIONS/ALLERGIES: reviewed    PHYSICAL EXAM:    VITALS: Vitals reviewed.   Vitals:    06/04/24 0746   BP: 138/75   Pulse: 72   Resp: 14   Temp: 97.9 °F (36.6 °C)   TempSrc: Temporal   SpO2: 95%   Weight: 113.4 kg (250 lb)   Height: 6' 1" (1.854 m)       Physical Exam  Vitals and nursing note reviewed.   Constitutional:       Appearance: Normal appearance. He is not toxic-appearing or diaphoretic.   HENT:      Head: Normocephalic and atraumatic.   Eyes:      General:         Right eye: No discharge.         Left eye: No discharge.      Extraocular Movements: Extraocular movements intact.      Conjunctiva/sclera: Conjunctivae normal.   Cardiovascular:      Rate and Rhythm: Normal rate.   Pulmonary:      Effort: Pulmonary effort is normal. No respiratory distress.      Breath sounds: Normal breath sounds.   Abdominal:      Palpations: Abdomen is soft.   Skin:     " General: Skin is warm and dry.      Findings: No rash.   Neurological:      Mental Status: He is alert and oriented to person, place, and time.   Psychiatric:         Mood and Affect: Mood and affect normal.         Cognition and Memory: Memory normal.         Judgment: Judgment normal.          EXTREMITIES:    Gen: No cyanosis, edema, varicosities, or tenderness to palpation BLE   Skin: Warm, pink, dry, no rashes, no lesions BLE   Strength: 5/5 motor strength BLE   ROM: hips, knees and ankles without pain or instability.     NEUROLOGICAL:    Gen: No clonus or spasticity.   Gait: Normal without antalgic lean   DTR's: 2+ in bilateral patellar, and ankle   BABINSKI: Absent bilaterally  Sensory: Intact to light touch and proprioception BLE    ASSESSMENT: Gregorio Guerra is a 69 y.o. male who presents for LUMBAR RFA LEFT L4-5, L5-S1 .     PLAN:  Proceed with LUMBAR RFA LEFT L4-5, L5-S1  as previously discussed.    This patient has been cleared for surgery in an ambulatory surgical facility    ASA 3,  Mallampatti Score 3  No history of anesthetic complications  Plan for RN IV sedation    Lita Billings MD  Pain Management

## 2024-06-05 LAB
OHS QRS DURATION: 116 MS
OHS QTC CALCULATION: 485 MS

## 2024-06-12 ENCOUNTER — PATIENT MESSAGE (OUTPATIENT)
Dept: FAMILY MEDICINE | Facility: CLINIC | Age: 69
End: 2024-06-12
Payer: MEDICARE

## 2024-06-13 ENCOUNTER — LAB VISIT (OUTPATIENT)
Dept: LAB | Facility: CLINIC | Age: 69
End: 2024-06-13
Payer: MEDICARE

## 2024-06-13 ENCOUNTER — OFFICE VISIT (OUTPATIENT)
Dept: FAMILY MEDICINE | Facility: CLINIC | Age: 69
End: 2024-06-13
Payer: MEDICARE

## 2024-06-13 VITALS
SYSTOLIC BLOOD PRESSURE: 138 MMHG | DIASTOLIC BLOOD PRESSURE: 74 MMHG | OXYGEN SATURATION: 96 % | WEIGHT: 256 LBS | HEART RATE: 87 BPM | HEIGHT: 73 IN | BODY MASS INDEX: 33.93 KG/M2

## 2024-06-13 DIAGNOSIS — Z12.5 PROSTATE CANCER SCREENING: ICD-10-CM

## 2024-06-13 DIAGNOSIS — Z12.11 COLON CANCER SCREENING: ICD-10-CM

## 2024-06-13 DIAGNOSIS — R79.89 OTHER SPECIFIED ABNORMAL FINDINGS OF BLOOD CHEMISTRY: ICD-10-CM

## 2024-06-13 DIAGNOSIS — I50.32 CHRONIC HEART FAILURE WITH PRESERVED EJECTION FRACTION: ICD-10-CM

## 2024-06-13 DIAGNOSIS — E66.09 CLASS 1 OBESITY DUE TO EXCESS CALORIES WITH SERIOUS COMORBIDITY AND BODY MASS INDEX (BMI) OF 34.0 TO 34.9 IN ADULT: ICD-10-CM

## 2024-06-13 DIAGNOSIS — I48.0 PAROXYSMAL ATRIAL FIBRILLATION: Primary | ICD-10-CM

## 2024-06-13 DIAGNOSIS — I48.0 PAROXYSMAL ATRIAL FIBRILLATION: ICD-10-CM

## 2024-06-13 LAB
ALBUMIN SERPL BCP-MCNC: 4 G/DL (ref 3.5–5.2)
ALP SERPL-CCNC: 114 U/L (ref 55–135)
ALT SERPL W/O P-5'-P-CCNC: 57 U/L (ref 10–44)
ANION GAP SERPL CALC-SCNC: 11 MMOL/L (ref 8–16)
AST SERPL-CCNC: 60 U/L (ref 10–40)
BILIRUB SERPL-MCNC: 1 MG/DL (ref 0.1–1)
BUN SERPL-MCNC: 13 MG/DL (ref 8–23)
CALCIUM SERPL-MCNC: 9.5 MG/DL (ref 8.7–10.5)
CHLORIDE SERPL-SCNC: 104 MMOL/L (ref 95–110)
CHOLEST SERPL-MCNC: 170 MG/DL (ref 120–199)
CHOLEST/HDLC SERPL: 2.7 {RATIO} (ref 2–5)
CO2 SERPL-SCNC: 24 MMOL/L (ref 23–29)
COMPLEXED PSA SERPL-MCNC: 0.59 NG/ML (ref 0–4)
CREAT SERPL-MCNC: 1 MG/DL (ref 0.5–1.4)
EST. GFR  (NO RACE VARIABLE): >60 ML/MIN/1.73 M^2
ESTIMATED AVG GLUCOSE: 111 MG/DL (ref 68–131)
GLUCOSE SERPL-MCNC: 107 MG/DL (ref 70–110)
HBA1C MFR BLD: 5.5 % (ref 4–5.6)
HDLC SERPL-MCNC: 64 MG/DL (ref 40–75)
HDLC SERPL: 37.6 % (ref 20–50)
LDLC SERPL CALC-MCNC: 67 MG/DL (ref 63–159)
NONHDLC SERPL-MCNC: 106 MG/DL
POTASSIUM SERPL-SCNC: 4.1 MMOL/L (ref 3.5–5.1)
PROT SERPL-MCNC: 7.7 G/DL (ref 6–8.4)
SODIUM SERPL-SCNC: 139 MMOL/L (ref 136–145)
TRIGL SERPL-MCNC: 195 MG/DL (ref 30–150)

## 2024-06-13 PROCEDURE — 4010F ACE/ARB THERAPY RXD/TAKEN: CPT | Mod: CPTII,S$GLB,, | Performed by: STUDENT IN AN ORGANIZED HEALTH CARE EDUCATION/TRAINING PROGRAM

## 2024-06-13 PROCEDURE — 84153 ASSAY OF PSA TOTAL: CPT | Performed by: STUDENT IN AN ORGANIZED HEALTH CARE EDUCATION/TRAINING PROGRAM

## 2024-06-13 PROCEDURE — 80061 LIPID PANEL: CPT | Performed by: STUDENT IN AN ORGANIZED HEALTH CARE EDUCATION/TRAINING PROGRAM

## 2024-06-13 PROCEDURE — 3008F BODY MASS INDEX DOCD: CPT | Mod: CPTII,S$GLB,, | Performed by: STUDENT IN AN ORGANIZED HEALTH CARE EDUCATION/TRAINING PROGRAM

## 2024-06-13 PROCEDURE — 99214 OFFICE O/P EST MOD 30 MIN: CPT | Mod: S$GLB,,, | Performed by: STUDENT IN AN ORGANIZED HEALTH CARE EDUCATION/TRAINING PROGRAM

## 2024-06-13 PROCEDURE — 3078F DIAST BP <80 MM HG: CPT | Mod: CPTII,S$GLB,, | Performed by: STUDENT IN AN ORGANIZED HEALTH CARE EDUCATION/TRAINING PROGRAM

## 2024-06-13 PROCEDURE — 80053 COMPREHEN METABOLIC PANEL: CPT | Performed by: STUDENT IN AN ORGANIZED HEALTH CARE EDUCATION/TRAINING PROGRAM

## 2024-06-13 PROCEDURE — 36415 COLL VENOUS BLD VENIPUNCTURE: CPT | Mod: ,,, | Performed by: STUDENT IN AN ORGANIZED HEALTH CARE EDUCATION/TRAINING PROGRAM

## 2024-06-13 PROCEDURE — 1159F MED LIST DOCD IN RCRD: CPT | Mod: CPTII,S$GLB,, | Performed by: STUDENT IN AN ORGANIZED HEALTH CARE EDUCATION/TRAINING PROGRAM

## 2024-06-13 PROCEDURE — 1125F AMNT PAIN NOTED PAIN PRSNT: CPT | Mod: CPTII,S$GLB,, | Performed by: STUDENT IN AN ORGANIZED HEALTH CARE EDUCATION/TRAINING PROGRAM

## 2024-06-13 PROCEDURE — 83036 HEMOGLOBIN GLYCOSYLATED A1C: CPT | Performed by: STUDENT IN AN ORGANIZED HEALTH CARE EDUCATION/TRAINING PROGRAM

## 2024-06-13 PROCEDURE — 3075F SYST BP GE 130 - 139MM HG: CPT | Mod: CPTII,S$GLB,, | Performed by: STUDENT IN AN ORGANIZED HEALTH CARE EDUCATION/TRAINING PROGRAM

## 2024-06-13 NOTE — PROGRESS NOTES
"  [facility]  [address], [address]  [place] [address]    Subjective     Patient ID: Gregorio Guerra is a 69 y.o. male who comes to the clinic for a follow up visit.    Chief Complaint: Follow-up and Arm Pain    Afib - sees Dr. He, is s/p DC cardioversion in Dec 2023, is doing great now, in sinus rhythm. 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 Takes lasix 20mg daily, also on losartan, metoprolol succinate, and aldactone. Has never been hospitalized for HF exacerbation    Chronic back pain - on percocet 10mg TID, written by Dr. Billings. Also on gabapentin for neuropathy    HLD - ASCVD risk is at 27%, continue crestor 20mg daily    Thrombocytopenia - platelets in the 110-130 range, also has macrocytosis. Liver u/s in March 2024 showed hepatomegaly w/ fatty infiltration but signs of cirrhosis    Fatty Liver - see thrombocytopenia above    Left elbow pain - felt a popping sensation about a week ago, has significant bruising and pain    ROS negative unless stated above       Objective     Vitals:    06/13/24 1418   BP: 138/74   Pulse: 87   SpO2: 96%   Weight: 116.1 kg (256 lb)   Height: 6' 1" (1.854 m)        Wt Readings from Last 3 Encounters:   06/13/24 1418 116.1 kg (256 lb)   06/04/24 0746 113.4 kg (250 lb)   05/30/24 1430 117 kg (258 lb)        Physical Exam  Vitals reviewed.   Constitutional:       Appearance: Normal appearance.   HENT:      Head: Normocephalic and atraumatic.   Eyes:      Extraocular Movements: Extraocular movements intact.      Pupils: Pupils are equal, round, and reactive to light.   Cardiovascular:      Rate and Rhythm: Normal rate.      Pulses: Normal pulses.      Heart sounds: Normal heart sounds.   Pulmonary:      Effort: Pulmonary effort is normal. No respiratory distress.      Breath sounds: Normal breath sounds.   Musculoskeletal:         General: " Normal range of motion.      Cervical back: Normal range of motion and neck supple.   Skin:     General: Skin is dry.      Capillary Refill: Capillary refill takes less than 2 seconds.   Neurological:      General: No focal deficit present.      Mental Status: He is alert and oriented to person, place, and time. Mental status is at baseline.   Psychiatric:         Mood and Affect: Mood normal.         Behavior: Behavior normal.         Thought Content: Thought content normal.         Current Outpatient Medications   Medication Instructions    albuterol (VENTOLIN HFA) 90 mcg/actuation inhaler 2 puffs, Inhalation, Every 6 hours PRN, Rescue    apixaban (ELIQUIS) 5 mg, Oral, 2 times daily    colchicine (COLCRYS) 0.6 mg tablet Take 2 tablets at first sign of flare, then 0.6mg one hour after. On subsequent days, take 0.6mg daily until flare resolves    cyclobenzaprine (FLEXERIL) 10 MG tablet No dose, route, or frequency recorded.    furosemide (LASIX) 20 mg, Oral, Daily    gabapentin (NEURONTIN) 300 MG capsule No dose, route, or frequency recorded.    HYDROcodone-acetaminophen (NORCO)  mg per tablet Oral    ipratropium (ATROVENT) 500 mcg, Nebulization, Every 6 hours PRN, Rescue    losartan (COZAAR) 25 mg, Oral, Daily    metoprolol succinate (TOPROL-XL) 50 mg, Oral, Daily    oxyCODONE-acetaminophen (PERCOCET)  mg per tablet 1 tablet, Oral    oxyCODONE-acetaminophen (PERCOCET) 7.5-325 mg per tablet 1 tablet, Oral    OZEMPIC 0.25 mg, Subcutaneous, Every 7 days    rosuvastatin (CRESTOR) 20 mg, Oral, Daily    sildenafiL (VIAGRA) 50 MG tablet TAKE 1 TABLET BY MOUTH AS NEEDED FOR ERECTILE DYSFUNCTION    spironolactone (ALDACTONE) 25 mg, Oral, Daily    varenicline (CHANTIX) 1 mg, Oral, 2 times daily           Assessment and Plan     1. Paroxysmal atrial fibrillation  -     Lipid panel; Future; Expected date: 06/13/2024  -     Comprehensive metabolic panel; Future; Expected date: 06/13/2024    2. Chronic heart failure  with preserved ejection fraction    3. Class 1 obesity due to excess calories with serious comorbidity and body mass index (BMI) of 34.0 to 34.9 in adult  -     Hemoglobin A1c; Future; Expected date: 06/13/2024    4. Colon cancer screening  -     Cologuard Screening (Multitarget Stool DNA); Future; Expected date: 06/13/2024    5. Prostate cancer screening  -     PSA, Screening; Future; Expected date: 06/13/2024    6. Other specified abnormal findings of blood chemistry  -     Lipid panel; Future; Expected date: 06/13/2024  -     Hemoglobin A1c; Future; Expected date: 06/13/2024        Here for follow up    For afib, continue current regimen    For obesity, congratulated on 10lb weight loss, continue trying to get ozempic through cards    For HTN, continue regimen as above    For other chronic conditions, continue regimen as listed above    Declined vaccines    Reordering cologuard    RTC in 6 months         I encouraged the patient to take all medications as prescribed and to keep follow up appointments with their providers. Patient stated they had no other concerns. Questions were invited and answered. Follow up sooner if needed.     Girish Roberts MD  06/13/2024 2:03 PM

## 2024-07-11 ENCOUNTER — OFFICE VISIT (OUTPATIENT)
Dept: FAMILY MEDICINE | Facility: CLINIC | Age: 69
End: 2024-07-11
Payer: MEDICARE

## 2024-07-11 VITALS
WEIGHT: 254 LBS | SYSTOLIC BLOOD PRESSURE: 130 MMHG | OXYGEN SATURATION: 98 % | HEIGHT: 73 IN | DIASTOLIC BLOOD PRESSURE: 70 MMHG | HEART RATE: 78 BPM | BODY MASS INDEX: 33.66 KG/M2

## 2024-07-11 DIAGNOSIS — Z01.818 PREOP EXAMINATION: Primary | ICD-10-CM

## 2024-07-11 PROCEDURE — 1160F RVW MEDS BY RX/DR IN RCRD: CPT | Mod: CPTII,S$GLB,, | Performed by: STUDENT IN AN ORGANIZED HEALTH CARE EDUCATION/TRAINING PROGRAM

## 2024-07-11 PROCEDURE — 3008F BODY MASS INDEX DOCD: CPT | Mod: CPTII,S$GLB,, | Performed by: STUDENT IN AN ORGANIZED HEALTH CARE EDUCATION/TRAINING PROGRAM

## 2024-07-11 PROCEDURE — 99214 OFFICE O/P EST MOD 30 MIN: CPT | Mod: S$GLB,,, | Performed by: STUDENT IN AN ORGANIZED HEALTH CARE EDUCATION/TRAINING PROGRAM

## 2024-07-11 PROCEDURE — 3044F HG A1C LEVEL LT 7.0%: CPT | Mod: CPTII,S$GLB,, | Performed by: STUDENT IN AN ORGANIZED HEALTH CARE EDUCATION/TRAINING PROGRAM

## 2024-07-11 PROCEDURE — 4010F ACE/ARB THERAPY RXD/TAKEN: CPT | Mod: CPTII,S$GLB,, | Performed by: STUDENT IN AN ORGANIZED HEALTH CARE EDUCATION/TRAINING PROGRAM

## 2024-07-11 PROCEDURE — 3075F SYST BP GE 130 - 139MM HG: CPT | Mod: CPTII,S$GLB,, | Performed by: STUDENT IN AN ORGANIZED HEALTH CARE EDUCATION/TRAINING PROGRAM

## 2024-07-11 PROCEDURE — 3078F DIAST BP <80 MM HG: CPT | Mod: CPTII,S$GLB,, | Performed by: STUDENT IN AN ORGANIZED HEALTH CARE EDUCATION/TRAINING PROGRAM

## 2024-07-11 PROCEDURE — 1126F AMNT PAIN NOTED NONE PRSNT: CPT | Mod: CPTII,S$GLB,, | Performed by: STUDENT IN AN ORGANIZED HEALTH CARE EDUCATION/TRAINING PROGRAM

## 2024-07-11 PROCEDURE — 1159F MED LIST DOCD IN RCRD: CPT | Mod: CPTII,S$GLB,, | Performed by: STUDENT IN AN ORGANIZED HEALTH CARE EDUCATION/TRAINING PROGRAM

## 2024-07-11 NOTE — LETTER
July 11, 2024    Gregorio Guerra  [address]  [place] [address]             [facility]  [address] [place] [address]  Phone: [phone] To Whom It May Concern,    Patient has been informed of the risks of surgery (including infection, bleeding, blood clots/PE, stroke, heart attack, dependence of ventilator, and even death). Patient understands these risks and would like to proceed w/ surgery.        If you have any questions or concerns, please don't hesitate to call.    Sincerely,        Girish Roberts MD

## 2024-07-11 NOTE — PROGRESS NOTES
"  [facility]  [address], [address]  [place] [address]    Subjective     Patient ID: Gregorio Guerra is a 69 y.o. male who comes to the clinic for an acute visit.    Chief Complaint: Pre-op Exam (Patient is here for a pre-op. Patient is having right hip surgery with Dr. Aguayo.)    Needs preop visit, just had EKG done and it was normal. Labs as well. Is going to have left total hip replacement    ROS negative unless stated above       Objective     Vitals:    07/11/24 1456   BP: 130/70   Pulse: 78   SpO2: 98%   Weight: 115.2 kg (254 lb)   Height: 6' 1" (1.854 m)       Wt Readings from Last 3 Encounters:   07/11/24 1456 115.2 kg (254 lb)   06/13/24 1418 116.1 kg (256 lb)   06/04/24 0746 113.4 kg (250 lb)        Physical Exam  Vitals reviewed.   Constitutional:       Appearance: Normal appearance.   HENT:      Head: Normocephalic and atraumatic.   Eyes:      Extraocular Movements: Extraocular movements intact.      Pupils: Pupils are equal, round, and reactive to light.   Cardiovascular:      Rate and Rhythm: Normal rate.      Pulses: Normal pulses.      Heart sounds: Normal heart sounds.   Pulmonary:      Effort: Pulmonary effort is normal. No respiratory distress.      Breath sounds: Normal breath sounds.   Musculoskeletal:         General: Normal range of motion.      Cervical back: Normal range of motion and neck supple.   Skin:     General: Skin is dry.      Capillary Refill: Capillary refill takes less than 2 seconds.   Neurological:      General: No focal deficit present.      Mental Status: He is alert and oriented to person, place, and time. Mental status is at baseline.   Psychiatric:         Mood and Affect: Mood normal.         Behavior: Behavior normal.         Thought Content: Thought content normal.         Current Outpatient Medications   Medication Instructions    albuterol (VENTOLIN HFA) 90 mcg/actuation inhaler 2 puffs, " Inhalation, Every 6 hours PRN, Rescue    apixaban (ELIQUIS) 5 mg, Oral, 2 times daily    colchicine (COLCRYS) 0.6 mg tablet Take 2 tablets at first sign of flare, then 0.6mg one hour after. On subsequent days, take 0.6mg daily until flare resolves    cyclobenzaprine (FLEXERIL) 10 MG tablet No dose, route, or frequency recorded.    furosemide (LASIX) 20 mg, Oral, Daily    gabapentin (NEURONTIN) 300 MG capsule No dose, route, or frequency recorded.    HYDROcodone-acetaminophen (NORCO)  mg per tablet Take by mouth.    ipratropium (ATROVENT) 500 mcg, Nebulization, Every 6 hours PRN, Rescue    losartan (COZAAR) 25 mg, Oral, Daily    metoprolol succinate (TOPROL-XL) 50 mg, Oral, Daily    oxyCODONE-acetaminophen (PERCOCET)  mg per tablet 1 tablet, Oral    oxyCODONE-acetaminophen (PERCOCET) 7.5-325 mg per tablet 1 tablet, Oral    OZEMPIC 0.25 mg, Subcutaneous, Every 7 days    rosuvastatin (CRESTOR) 20 mg, Oral, Daily    sildenafiL (VIAGRA) 50 MG tablet TAKE 1 TABLET BY MOUTH AS NEEDED FOR ERECTILE DYSFUNCTION    spironolactone (ALDACTONE) 25 mg, Oral, Daily           Assessment and Plan     1. Preop examination        Here for an acute visit    Patient has been informed of the risks of surgery (including infection, bleeding, blood clots/PE, stroke, heart attack, dependence of ventilator, and even death). Patient understands these risks and would like to proceed w/ surgery.      Will hold AC 1 week out from surgery, no NSAID 1 week prior to surgery, no meds on day of surgery    RTC at regular visit         I encouraged the patient to take all medications as prescribed and to keep follow up appointments with their providers. Patient stated they had no other concerns. Questions were invited and answered. Follow up sooner if need. ED precautions given.    Girish Roberts MD  07/11/2024 3:02 PM

## 2024-07-27 ENCOUNTER — PATIENT MESSAGE (OUTPATIENT)
Dept: ADMINISTRATIVE | Facility: HOSPITAL | Age: 69
End: 2024-07-27
Payer: MEDICARE

## 2024-08-29 ENCOUNTER — OFFICE VISIT (OUTPATIENT)
Dept: CARDIOLOGY | Facility: CLINIC | Age: 69
End: 2024-08-29
Payer: MEDICARE

## 2024-08-29 VITALS
DIASTOLIC BLOOD PRESSURE: 82 MMHG | BODY MASS INDEX: 34.79 KG/M2 | HEIGHT: 73 IN | SYSTOLIC BLOOD PRESSURE: 135 MMHG | OXYGEN SATURATION: 96 % | WEIGHT: 262.5 LBS | HEART RATE: 87 BPM

## 2024-08-29 DIAGNOSIS — I11.0 LVH (LEFT VENTRICULAR HYPERTROPHY) DUE TO HYPERTENSIVE DISEASE, WITH HEART FAILURE: ICD-10-CM

## 2024-08-29 DIAGNOSIS — K76.0 FATTY LIVER: ICD-10-CM

## 2024-08-29 DIAGNOSIS — G47.33 OSA (OBSTRUCTIVE SLEEP APNEA): ICD-10-CM

## 2024-08-29 DIAGNOSIS — I48.0 PAROXYSMAL ATRIAL FIBRILLATION: Primary | ICD-10-CM

## 2024-08-29 DIAGNOSIS — F12.10 TETRAHYDROCANNABINOL (THC) USE DISORDER, MILD, ABUSE: ICD-10-CM

## 2024-08-29 DIAGNOSIS — Z91.89 AT RISK FOR CARDIOVASCULAR EVENT: ICD-10-CM

## 2024-08-29 DIAGNOSIS — Z79.01 CURRENT USE OF LONG TERM ANTICOAGULATION: ICD-10-CM

## 2024-08-29 DIAGNOSIS — F17.200 SMOKER: ICD-10-CM

## 2024-08-29 DIAGNOSIS — I50.32 CHRONIC HEART FAILURE WITH PRESERVED EJECTION FRACTION: ICD-10-CM

## 2024-08-29 DIAGNOSIS — G47.19 EXCESSIVE DAYTIME SLEEPINESS: ICD-10-CM

## 2024-08-29 DIAGNOSIS — I10 PRIMARY HYPERTENSION: ICD-10-CM

## 2024-08-29 DIAGNOSIS — R07.2 PRECORDIAL CHEST PAIN: ICD-10-CM

## 2024-08-29 PROCEDURE — 99999 PR PBB SHADOW E&M-EST. PATIENT-LVL IV: CPT | Mod: PBBFAC,,, | Performed by: INTERNAL MEDICINE

## 2024-08-29 RX ORDER — ONDANSETRON 4 MG/1
4 TABLET, FILM COATED ORAL EVERY 8 HOURS PRN
COMMUNITY
Start: 2024-07-31

## 2024-08-29 NOTE — PROGRESS NOTES
Subjective:    Patient ID:  Gregorio Guerra is a 69 y.o. male who presents for evaluation of Follow-up (AFib)  Came on own for PAF, post DCC 12/13/2023, smoker down to 5 cpd, high ASCVD 10-year event risk of over 27% on statin, 3-months review. Insurance denied Ozempic  PCP: Girish Roberts MD    Prior cardiologist: Leonel Velasco MD , last seen 2/2023  Pain: Lita Billings MD   Lives with wife, Scar, outdoor smoker  Retired [email or web address],     Social Determinate of Health: Do you have any problem with the following: none  Health Literacy (understanding): high  Vaccination: up to date yes, covid vaccine yes, covid infection 2022, no lasting effects  Activities: more active in PT 3 times weekly post THR at end of 7/2024, limited by CLBP  Nicotine: smoker  (1 pp week x 50 yrs), started age 13, now 5 cpd off Chantex,   Alcohol: 80 proof liquor, max 4 shots in any 24 hours, 3 times weekly.  Illicit Drugs: now and then THC gummies, Rx opioid tid for over 2 years.  Cardiac Symptoms: CP  Home BP: 130/70s  Medication Compliance: yes, do not skipped Eliquis now  Diet: Regular, use salt   Caffeine: Coffee 1 and 1/2 cup   Labs:   Lab Results   Component Value Date    TSH 2.930 11/09/2023        Lab Results   Component Value Date    HGBA1C 5.5 06/13/2024       Lab Results   Component Value Date    WBC 7.1 07/25/2024    HGB 11.9 (L) 07/30/2024    HCT 34.9 (L) 07/30/2024    MCV 99.8 (H) 07/25/2024     (L) 07/25/2024       CMP  Sodium   Date Value Ref Range Status   06/13/2024 139 136 - 145 mmol/L Final   11/09/2023 137 136 - 145 mmol/L Final   06/16/2021 137 136 - 145 mmol/L Final   10/17/2018 138 134 - 144 mmol/L      Potassium   Date Value Ref Range Status   06/13/2024 4.1 3.5 - 5.1 mmol/L Final   11/09/2023 4.6 3.5 - 5.1 mmol/L Final   06/16/2021 3.9 3.5 - 5.1 mmol/L Final     Chloride   Date Value Ref Range Status   06/13/2024 104 95 - 110 mmol/L Final   11/09/2023 104 95 - 110  mmol/L Final   06/16/2021 103 95 - 110 mmol/L Final   10/17/2018 105 98 - 110 mmol/L      CO2   Date Value Ref Range Status   06/13/2024 24 23 - 29 mmol/L Final   11/09/2023 21 (L) 23 - 29 mmol/L Final   06/16/2021 25 23 - 29 mmol/L Final     Glucose   Date Value Ref Range Status   06/13/2024 107 70 - 110 mg/dL Final   11/09/2023 139 (H) 70 - 110 mg/dL Final   06/16/2021 108 70 - 110 mg/dL Final   10/17/2018 98 70 - 99 mg/dL      BUN   Date Value Ref Range Status   06/13/2024 13 8 - 23 mg/dL Final   11/09/2023 12 8 - 23 mg/dL Final   06/16/2021 11 8 - 23 mg/dL Final     Creatinine   Date Value Ref Range Status   06/13/2024 1.0 0.5 - 1.4 mg/dL Final   11/09/2023 1.1 0.5 - 1.4 mg/dL Final   06/16/2021 0.9 0.5 - 1.4 mg/dL Final   10/17/2018 1.06 0.60 - 1.40 mg/dL      Calcium   Date Value Ref Range Status   06/13/2024 9.5 8.7 - 10.5 mg/dL Final   11/09/2023 9.3 8.7 - 10.5 mg/dL Final   06/16/2021 8.5 (L) 8.7 - 10.5 mg/dL Final     Total Protein   Date Value Ref Range Status   06/13/2024 7.7 6.0 - 8.4 g/dL Final   11/09/2023 7.4 6.0 - 8.4 g/dL Final   06/16/2021 6.6 6.0 - 8.4 g/dL Final     Albumin   Date Value Ref Range Status   06/13/2024 4.0 3.5 - 5.2 g/dL Final   11/09/2023 3.9 3.5 - 5.2 g/dL Final   06/16/2021 3.5 3.5 - 5.2 g/dL Final   10/17/2018 4.2 3.1 - 4.7 g/dL      Total Bilirubin   Date Value Ref Range Status   06/13/2024 1.0 0.1 - 1.0 mg/dL Final     Comment:     For infants and newborns, interpretation of results should be based  on gestational age, weight and in agreement with clinical  observations.    Premature Infant recommended reference ranges:  Up to 24 hours.............<8.0 mg/dL  Up to 48 hours............<12.0 mg/dL  3-5 days..................<15.0 mg/dL  6-29 days.................<15.0 mg/dL     11/09/2023 1.1 (H) 0.1 - 1.0 mg/dL Final     Comment:     For infants and newborns, interpretation of results should be based  on gestational age, weight and in agreement with  clinical  observations.    Premature Infant recommended reference ranges:  Up to 24 hours.............<8.0 mg/dL  Up to 48 hours............<12.0 mg/dL  3-5 days..................<15.0 mg/dL  6-29 days.................<15.0 mg/dL     06/16/2021 1.1 (H) 0.1 - 1.0 mg/dL Final     Comment:     For infants and newborns, interpretation of results should be based  on gestational age, weight and in agreement with clinical  observations.    Premature Infant recommended reference ranges:  Up to 24 hours.............<8.0 mg/dL  Up to 48 hours............<12.0 mg/dL  3-5 days..................<15.0 mg/dL  6-29 days.................<15.0 mg/dL       Alkaline Phosphatase   Date Value Ref Range Status   06/13/2024 114 55 - 135 U/L Final   11/09/2023 82 55 - 135 U/L Final   06/16/2021 70 55 - 135 U/L Final     AST   Date Value Ref Range Status   06/13/2024 60 (H) 10 - 40 U/L Final   11/09/2023 46 (H) 10 - 40 U/L Final   06/16/2021 22 10 - 40 U/L Final     ALT   Date Value Ref Range Status   06/13/2024 57 (H) 10 - 44 U/L Final   11/09/2023 38 10 - 44 U/L Final   06/16/2021 32 10 - 44 U/L Final     Anion Gap   Date Value Ref Range Status   06/13/2024 11 8 - 16 mmol/L Final   11/09/2023 12 8 - 16 mmol/L Final   06/16/2021 9 8 - 16 mmol/L Final     eGFR   Date Value Ref Range Status   06/13/2024 >60.0 >60 mL/min/1.73 m^2 Final   11/09/2023 >60.0 >60 mL/min/1.73 m^2 Final     @labrcntip(troponini)@    BNP   Date Value Ref Range Status   11/09/2023 193 (H) 0 - 99 pg/mL Final     Comment:     Values of less than 100 pg/ml are consistent with non-CHF populations.   }   Lab Results   Component Value Date    CHOL 170 06/13/2024    CHOL 240 (H) 11/09/2023    CHOL 174 08/16/2005     Lab Results   Component Value Date    HDL 64 06/13/2024    HDL 56 11/09/2023    HDL 52.0 08/16/2005     Lab Results   Component Value Date    LDLCALC 67.0 06/13/2024    LDLCALC 157.4 11/09/2023    LDLCALC 106.2 08/16/2005     Lab Results   Component Value Date     "TRIG 195 (H) 06/13/2024    TRIG 133 11/09/2023    TRIG 79 08/16/2005     Lab Results   Component Value Date    CHOLHDL 37.6 06/13/2024    CHOLHDL 23.3 11/09/2023    CHOLHDL 29.9 08/16/2005      Last DCC: 12/13/2023  Last Echo: 4/2021  Last stress test: Lexiscan 4/2021  Cardiovascular angiogram: 2018, "no blockages"  ECG: NSR rate 79, low voltage, IRBBB, QTc 481 msec,   Fundoscopic exam: due    In 10/2023:  WM, came on own to establish CV care for PAF with chronic fatigue. Does not feel the AF and active smoking and heavy alcohol use due to chronic pains. Anticipating back procedure on 11/6/2023. Very limited with CLBP along with chronic SOB. Denies any CP had angiogram in 2018 and told of no blockages.    Leonel Velasco MD noted 2/2023 "67 y.o. male is here for follow-up.       1. Chronic heart failure with preserved ejection fraction   2. Paroxysmal atrial fibrillation   3. CAD in native artery   4. Current every day smoker   5. Morbid obesity   6. Essential hypertension   7. Mixed hyperlipidemia     Echo 4/2021 - Interpretation Summary  · The left ventricle is normal in size with concentric remodeling and normal systolic function.  · The estimated ejection fraction is 60%.  · Normal left ventricular diastolic function.  · Normal right ventricular size with normal right ventricular systolic function.  · Mild left atrial enlargement.  · Mild mitral regurgitation.    Lexiscan - Interpretation Summary  ·  Abnormal myocardial perfusion scan.  ·  There is a moderate to severe intensity, moderate to large sized, mostly fixed with some reversibilty defect in the basal to distal inferior wall(s).  ·  There is a second small sized, mild to moderate intensity,  in the apex and anterobasilar wall(s).  ·  The gated perfusion images showed an ejection fraction of 44% post stress. Normal ejection fraction is greater than 53%.  ·  The EKG portion of this study is negative for ischemia.  ·  The patient reported no chest pain " during the stress test.    In 2/2024, return for follow up post DCC, positive for REED and awaiting CPAP titration. Also with high ASCVD 10-year event risk of over 27% not on statin, baseline LDL-C 157. Denies any CV symptoms. Troubled by chronic pains.     In 5/2024, return for review. Started CPAP but problem with dryness and high pressure. Still with occasional AF but still heavy on the alcohol.     Abdominal US - Enlarged liver with fatty infiltration.  Borderline size of the spleen.  No ascites.     Minimally dilated common bile duct, correlate with laboratory values.  Follow-up MRCP can be performed if indicated.    HPI comments: in 8/2024, return for review, post THR with no complications. Back to smoking about 5 cpd.     Review of Systems   Constitutional: Positive for weight gain (up about 4 lbs from 5/2024). Negative for diaphoresis, fever, malaise/fatigue, night sweats and weight loss.   HENT:  Negative for nosebleeds and tinnitus.    Eyes:  Positive for blurred vision. Negative for visual disturbance.   Cardiovascular:  Positive for chest pain and dyspnea on exertion. Negative for claudication, cyanosis, irregular heartbeat, leg swelling, near-syncope, orthopnea, palpitations and paroxysmal nocturnal dyspnea.   Respiratory:  Positive for snoring and wheezing. Negative for cough, shortness of breath and sleep disturbances due to breathing.         Townsend score 9 today a 6, awaken tired, have positive sleep apnea, not using CPAP. Bothered by the pressure. Willing to try again.   Endocrine: Negative for polydipsia and polyuria.   Hematologic/Lymphatic: Bruises/bleeds easily.   Skin:  Positive for itching. Negative for color change, flushing, nail changes, poor wound healing and suspicious lesions.   Musculoskeletal:  Positive for back pain and joint pain. Negative for arthritis, falls, gout, joint swelling, muscle cramps, muscle weakness and myalgias.   Gastrointestinal:  Positive for nausea. Negative for  "heartburn, hematemesis, hematochezia and melena.   Neurological:  Negative for disturbances in coordination, excessive daytime sleepiness, dizziness, focal weakness, headaches, light-headedness, loss of balance, numbness, vertigo and weakness.   Psychiatric/Behavioral:  Negative for depression and substance abuse. The patient does not have insomnia and is not nervous/anxious.         Objective:    Physical Exam  Constitutional:       Appearance: He is well-developed.      Comments: RA O2 sat 96%  Orthostatic VS: sitting 134/75, standing 135/82   HENT:      Head: Normocephalic.   Eyes:      Conjunctiva/sclera: Conjunctivae normal.      Pupils: Pupils are equal, round, and reactive to light.   Neck:      Thyroid: No thyromegaly.      Vascular: No JVD.      Comments: Circumference 17.5"  Cardiovascular:      Rate and Rhythm: Normal rate. Rhythm irregularly irregular.      Pulses: Intact distal pulses.           Carotid pulses are 1+ on the right side and 1+ on the left side.       Radial pulses are 1+ on the right side and 1+ on the left side.        Dorsalis pedis pulses are 1+ on the right side and 1+ on the left side.        Posterior tibial pulses are 1+ on the right side and 1+ on the left side.      Heart sounds: Heart sounds are distant. No murmur heard.     No friction rub. No gallop.      Comments: Spider veins bilaterally  Pulmonary:      Effort: Pulmonary effort is normal.      Breath sounds: Normal breath sounds. No rales.   Chest:      Chest wall: No tenderness.   Abdominal:      General: Bowel sounds are normal.      Palpations: Abdomen is soft.      Tenderness: There is no abdominal tenderness.      Comments: Waist 48" down to 46.5"   Musculoskeletal:         General: Normal range of motion.      Cervical back: Normal range of motion and neck supple.      Right lower leg: No edema.      Left lower leg: No edema.   Lymphadenopathy:      Cervical: No cervical adenopathy.   Skin:     General: Skin is warm " "and dry.      Findings: No rash.   Neurological:      Mental Status: He is alert and oriented to person, place, and time.           Assessment:       1. Paroxysmal atrial fibrillation    2. Primary hypertension, dx 2018    3. LVH (left ventricular hypertrophy) due to hypertensive disease, with heart failure    4. Precordial chest pain    5. REED (obstructive sleep apnea), off CPAP post THR 7/2024    6. At risk for cardiovascular event, ASCVD 10-year risk 27.2%, 11/2023    7. Tetrahydrocannabinol (THC) use disorder, mild, abuse, gummies, one a day since 2022    8. Smoker, started age 13, 50+ year, max 1 pack per day, pack per week for past 3 years. Now just 5 cpd off Chantex    9. Fatty liver    10. Excessive daytime sleepiness         Plan:       Gregorio was seen today for follow-up.    Diagnoses and all orders for this visit:    Paroxysmal atrial fibrillation  -     IN OFFICE EKG 12-LEAD (to Muse)    Primary hypertension, dx 2018  -     Hypertension [employer] [facility] ([facility]) Enrollment Order    LVH (left ventricular hypertrophy) due to hypertensive disease, with heart failure  -     Hypertension [employer] [facility] ([facility]) Enrollment Order    Precordial chest pain    REED (obstructive sleep apnea), off CPAP post THR 7/2024    At risk for cardiovascular event, ASCVD 10-year risk 27.2%, 11/2023    Tetrahydrocannabinol (THC) use disorder, mild, abuse, gummies, one a day since 2022    Smoker, started age 13, 50+ year, max 1 pack per day, pack per week for past 3 years. Now just 5 cpd off Chantex    Fatty liver    Excessive daytime sleepiness     - All medical issues reviewed, will check into getting Ozempic or Jardiance, declined smoking cessation. Willing to proceed with Smartsy for HTN  - Warning signs of MI and stroke given, if symptoms last more than 5 minutes, stop immediately and call 911, then chew 2-4 low-dose ASA (81 mg).   - CBT, cognitive behavior therapy for sleep disorder.   - Refer to "Chronic Pain" article " in Consumer Report 6/2019 issue.   - Need to work on CPAP  - Need annual fundoscopic examination   - CV status and all medications reviewed, patient acknowledge good understanding.  - Recommend healthy living: no nicotine, moderate alcohol, healthy diet and regular exercise aiming for fitness, restorative sleep and weight control  - Discussed healthy daily limit of 1 oz of pure alcohol in any 24 hours (roughly 2 12-oz beers, 10 oz of wine (8%-12% alcohol), or 1.5 oz of liquor (80 proof)), can not save up.   - Need good exercise program, 4 key elements: 1. Aerobic (walking, swimming, dancing, jogging, biking, etc, 2. Muscle strengthening / resistance exercise, need to do 2-3 times weekly, 3. Stretching daily, good stretch takes a whole  total minute. 4. Balance exercise daily.   - Encourage activities as much as tolerated. Any activity is better than none!   - Instruction for Mediterranean diet and heart healthy exercise given.  - Weigh twice weekly, try to lose 1-2 lbs per week. Target weight loss of 5%-10%.  - Highly recommend 30-60 minutes of exercise / activities daily, can have Sunday off, with 2-3 sessions of muscle strengthening weekly. A  would be very helpful.  - Recommend at least biannual cardiovascular evaluation in view of patient's significant risk factors. Patient's preference.      Total time spend including review of record prior to face-to-face visit is 40 minutes. Greater than 50% of the time was spent in counseling and coordination of care. The above assessment and plan have been discussed at length. Referring provider's note reviewed. Labs and procedure over the last 6 months reviewed. Problem List updated. Asked to bring in all active medications / pills bottles with next visit. Will send note to referring / PCP.

## 2024-08-30 ENCOUNTER — PATIENT MESSAGE (OUTPATIENT)
Dept: CARDIOLOGY | Facility: CLINIC | Age: 69
End: 2024-08-30
Payer: MEDICARE

## 2024-08-30 DIAGNOSIS — R60.0 BILATERAL LOWER EXTREMITY EDEMA: ICD-10-CM

## 2024-08-30 LAB
OHS QRS DURATION: 118 MS
OHS QTC CALCULATION: 481 MS

## 2024-08-30 RX ORDER — FUROSEMIDE 20 MG/1
20 TABLET ORAL DAILY
Qty: 90 TABLET | Refills: 3 | Status: SHIPPED | OUTPATIENT
Start: 2024-08-30

## 2024-09-12 ENCOUNTER — TELEPHONE (OUTPATIENT)
Dept: CARDIOLOGY | Facility: CLINIC | Age: 69
End: 2024-09-12
Payer: MEDICARE

## 2024-10-09 ENCOUNTER — PATIENT MESSAGE (OUTPATIENT)
Dept: ADMINISTRATIVE | Facility: HOSPITAL | Age: 69
End: 2024-10-09
Payer: MEDICARE

## 2024-11-18 RX ORDER — METOPROLOL SUCCINATE 50 MG/1
50 TABLET, EXTENDED RELEASE ORAL DAILY
Qty: 90 TABLET | Refills: 3 | Status: SHIPPED | OUTPATIENT
Start: 2024-11-18

## 2024-11-18 RX ORDER — SPIRONOLACTONE 25 MG/1
25 TABLET ORAL DAILY
Qty: 90 TABLET | Refills: 3 | Status: SHIPPED | OUTPATIENT
Start: 2024-11-18

## 2024-11-20 ENCOUNTER — TELEPHONE (OUTPATIENT)
Dept: SMOKING CESSATION | Facility: CLINIC | Age: 69
End: 2024-11-20
Payer: MEDICARE

## 2024-12-10 ENCOUNTER — OFFICE VISIT (OUTPATIENT)
Dept: FAMILY MEDICINE | Facility: CLINIC | Age: 69
End: 2024-12-10
Payer: MEDICARE

## 2024-12-10 VITALS
DIASTOLIC BLOOD PRESSURE: 70 MMHG | RESPIRATION RATE: 20 BRPM | HEIGHT: 73 IN | OXYGEN SATURATION: 96 % | BODY MASS INDEX: 34.99 KG/M2 | HEART RATE: 88 BPM | WEIGHT: 264 LBS | SYSTOLIC BLOOD PRESSURE: 123 MMHG

## 2024-12-10 DIAGNOSIS — E66.09 CLASS 1 OBESITY DUE TO EXCESS CALORIES WITH SERIOUS COMORBIDITY AND BODY MASS INDEX (BMI) OF 34.0 TO 34.9 IN ADULT: ICD-10-CM

## 2024-12-10 DIAGNOSIS — J40 BRONCHITIS: ICD-10-CM

## 2024-12-10 DIAGNOSIS — I48.0 PAROXYSMAL ATRIAL FIBRILLATION: ICD-10-CM

## 2024-12-10 DIAGNOSIS — E78.2 MIXED HYPERLIPIDEMIA: ICD-10-CM

## 2024-12-10 DIAGNOSIS — E66.811 CLASS 1 OBESITY DUE TO EXCESS CALORIES WITH SERIOUS COMORBIDITY AND BODY MASS INDEX (BMI) OF 34.0 TO 34.9 IN ADULT: ICD-10-CM

## 2024-12-10 DIAGNOSIS — I10 PRIMARY HYPERTENSION: Primary | ICD-10-CM

## 2024-12-10 PROBLEM — Z87.891 PERSONAL HISTORY OF NICOTINE DEPENDENCE: Status: RESOLVED | Noted: 2024-03-01 | Resolved: 2024-12-10

## 2024-12-10 PROBLEM — Z91.89 SEDENTARY LIFESTYLE: Status: RESOLVED | Noted: 2024-02-29 | Resolved: 2024-12-10

## 2024-12-10 PROBLEM — Z79.899 POLYPHARMACY: Status: RESOLVED | Noted: 2024-02-29 | Resolved: 2024-12-10

## 2024-12-10 PROBLEM — Z91.89 AT RISK FOR CARDIOVASCULAR EVENT: Status: RESOLVED | Noted: 2024-02-29 | Resolved: 2024-12-10

## 2024-12-10 PROBLEM — I11.0 LVH (LEFT VENTRICULAR HYPERTROPHY) DUE TO HYPERTENSIVE DISEASE, WITH HEART FAILURE: Status: RESOLVED | Noted: 2024-02-06 | Resolved: 2024-12-10

## 2024-12-10 PROBLEM — G47.19 EXCESSIVE DAYTIME SLEEPINESS: Status: RESOLVED | Noted: 2023-10-26 | Resolved: 2024-12-10

## 2024-12-10 PROCEDURE — 1160F RVW MEDS BY RX/DR IN RCRD: CPT | Mod: CPTII,S$GLB,, | Performed by: STUDENT IN AN ORGANIZED HEALTH CARE EDUCATION/TRAINING PROGRAM

## 2024-12-10 PROCEDURE — 3044F HG A1C LEVEL LT 7.0%: CPT | Mod: CPTII,S$GLB,, | Performed by: STUDENT IN AN ORGANIZED HEALTH CARE EDUCATION/TRAINING PROGRAM

## 2024-12-10 PROCEDURE — 1125F AMNT PAIN NOTED PAIN PRSNT: CPT | Mod: CPTII,S$GLB,, | Performed by: STUDENT IN AN ORGANIZED HEALTH CARE EDUCATION/TRAINING PROGRAM

## 2024-12-10 PROCEDURE — 3078F DIAST BP <80 MM HG: CPT | Mod: CPTII,S$GLB,, | Performed by: STUDENT IN AN ORGANIZED HEALTH CARE EDUCATION/TRAINING PROGRAM

## 2024-12-10 PROCEDURE — 99214 OFFICE O/P EST MOD 30 MIN: CPT | Mod: S$GLB,,, | Performed by: STUDENT IN AN ORGANIZED HEALTH CARE EDUCATION/TRAINING PROGRAM

## 2024-12-10 PROCEDURE — 4010F ACE/ARB THERAPY RXD/TAKEN: CPT | Mod: CPTII,S$GLB,, | Performed by: STUDENT IN AN ORGANIZED HEALTH CARE EDUCATION/TRAINING PROGRAM

## 2024-12-10 PROCEDURE — 3074F SYST BP LT 130 MM HG: CPT | Mod: CPTII,S$GLB,, | Performed by: STUDENT IN AN ORGANIZED HEALTH CARE EDUCATION/TRAINING PROGRAM

## 2024-12-10 PROCEDURE — G2211 COMPLEX E/M VISIT ADD ON: HCPCS | Mod: S$GLB,,, | Performed by: STUDENT IN AN ORGANIZED HEALTH CARE EDUCATION/TRAINING PROGRAM

## 2024-12-10 PROCEDURE — 1159F MED LIST DOCD IN RCRD: CPT | Mod: CPTII,S$GLB,, | Performed by: STUDENT IN AN ORGANIZED HEALTH CARE EDUCATION/TRAINING PROGRAM

## 2024-12-10 PROCEDURE — 3008F BODY MASS INDEX DOCD: CPT | Mod: CPTII,S$GLB,, | Performed by: STUDENT IN AN ORGANIZED HEALTH CARE EDUCATION/TRAINING PROGRAM

## 2024-12-10 PROCEDURE — 3288F FALL RISK ASSESSMENT DOCD: CPT | Mod: CPTII,S$GLB,, | Performed by: STUDENT IN AN ORGANIZED HEALTH CARE EDUCATION/TRAINING PROGRAM

## 2024-12-10 PROCEDURE — 1101F PT FALLS ASSESS-DOCD LE1/YR: CPT | Mod: CPTII,S$GLB,, | Performed by: STUDENT IN AN ORGANIZED HEALTH CARE EDUCATION/TRAINING PROGRAM

## 2024-12-10 RX ORDER — SEMAGLUTIDE 0.25 MG/.5ML
0.25 INJECTION, SOLUTION SUBCUTANEOUS WEEKLY
Qty: 2 ML | Refills: 2 | Status: SHIPPED | OUTPATIENT
Start: 2024-12-10

## 2024-12-10 RX ORDER — ALBUTEROL SULFATE 90 UG/1
2 INHALANT RESPIRATORY (INHALATION) EVERY 6 HOURS PRN
Qty: 18 G | Refills: 0 | Status: SHIPPED | OUTPATIENT
Start: 2024-12-10

## 2024-12-10 RX ORDER — PREDNISONE 10 MG/1
TABLET ORAL
COMMUNITY
Start: 2024-11-25

## 2024-12-10 RX ORDER — LOSARTAN POTASSIUM 25 MG/1
25 TABLET ORAL DAILY
Qty: 90 TABLET | Refills: 3 | Status: SHIPPED | OUTPATIENT
Start: 2024-12-10

## 2024-12-10 NOTE — PROGRESS NOTES
"  [facility]    [facility]  [address]  [place] [address]    Subjective     Patient ID: Gregorio Guerra is a 69 y.o. male who comes to the clinic for a follow up visit.    Chief Complaint: Follow-up (6 mos follow up)    Afib - sees Dr. He, is s/p DC cardioversion in Dec 2023, is doing great now, in sinus rhythm. 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 Takes lasix 20mg daily, also on losartan, metoprolol succinate, and aldactone. Has never been hospitalized for HF exacerbation     Chronic back pain - on percocet 10mg TID, written by Dr. Billings. Also on gabapentin for neuropathy     HLD - ASCVD risk at 27%, takes crestor 20mg daily     Thrombocytopenia - platelets in the 110-130 range, also has macrocytosis. Liver u/s in March 2024 showed hepatomegaly w/ fatty infiltration but signs of cirrhosis    Fatty Liver - see thrombocytopenia above    Left hip pain - s/p replacement w/ Dr. Aguayo    ROS negative unless stated above       Objective     Vitals:    12/10/24 1401   BP: 123/70   BP Location: Left arm   Patient Position: Sitting   Pulse: 88   Resp: 20   SpO2: 96%   Weight: 119.7 kg (264 lb)   Height: 6' 1" (1.854 m)        Wt Readings from Last 3 Encounters:   12/10/24 1401 119.7 kg (264 lb)   08/29/24 1414 119.1 kg (262 lb 8 oz)   07/11/24 1456 115.2 kg (254 lb)        Physical Exam  Constitutional:       General: He is not in acute distress.     Appearance: Normal appearance. He is not ill-appearing.   HENT:      Head: Normocephalic and atraumatic.      Mouth/Throat:      Mouth: Mucous membranes are moist.   Eyes:      Extraocular Movements: Extraocular movements intact.      Pupils: Pupils are equal, round, and reactive to light.   Cardiovascular:      Rate and Rhythm: Normal rate.   Pulmonary:      Effort: Pulmonary effort is normal. No respiratory distress.   Musculoskeletal: "         General: Normal range of motion.      Cervical back: Normal range of motion and neck supple.   Skin:     General: Skin is warm and dry.   Neurological:      General: No focal deficit present.      Mental Status: He is alert and oriented to person, place, and time. Mental status is at baseline.   Psychiatric:         Mood and Affect: Mood normal.         Behavior: Behavior normal.         Thought Content: Thought content normal.         Current Outpatient Medications   Medication Instructions    albuterol (VENTOLIN HFA) 90 mcg/actuation inhaler 2 puffs, Inhalation, Every 6 hours PRN, Rescue    apixaban (ELIQUIS) 5 mg, Oral, 2 times daily    colchicine (COLCRYS) 0.6 mg tablet Take 2 tablets at first sign of flare, then 0.6mg one hour after. On subsequent days, take 0.6mg daily until flare resolves    furosemide (LASIX) 20 mg, Oral, Daily    gabapentin (NEURONTIN) 300 MG capsule No dose, route, or frequency recorded.    losartan (COZAAR) 25 mg, Oral, Daily    metoprolol succinate (TOPROL-XL) 50 mg, Oral, Daily    ondansetron (ZOFRAN) 4 mg, Every 8 hours PRN    oxyCODONE-acetaminophen (PERCOCET)  mg per tablet 1 tablet    predniSONE (DELTASONE) 10 MG tablet 4 tabs x 4 days then 3 tabs x 4 days then 2 tabs x 4 days then 1 tab x 4 days    rosuvastatin (CRESTOR) 20 mg, Oral, Daily    sildenafiL (VIAGRA) 50 MG tablet TAKE 1 TABLET BY MOUTH AS NEEDED FOR ERECTILE DYSFUNCTION    spironolactone (ALDACTONE) 25 mg, Oral, Daily    WEGOVY 0.25 mg, Subcutaneous, Weekly           Assessment and Plan     1. Primary hypertension  -     losartan (COZAAR) 25 MG tablet; Take 1 tablet (25 mg total) by mouth once daily.  Dispense: 90 tablet; Refill: 3    2. Mixed hyperlipidemia    3. Paroxysmal atrial fibrillation    4. Class 1 obesity due to excess calories with serious comorbidity and body mass index (BMI) of 34.0 to 34.9 in adult  -     semaglutide, weight loss, (WEGOVY) 0.25 mg/0.5 mL PnIj; Inject 0.25 mg into the skin  once a week.  Dispense: 2 mL; Refill: 2    5. Bronchitis  -     albuterol (VENTOLIN HFA) 90 mcg/actuation inhaler; Inhale 2 puffs into the lungs every 6 (six) hours as needed for Wheezing. Rescue  Dispense: 18 g; Refill: 0        Here for follow up.    For weight loss w/ comorbidities, writing wegovy, has tried and failed diet and exercise over 12 months    For HLD, continue statin    For HTN, continue losartan    Encouraged use of cologuard, he said he would    The visit today included increased complexity associated with the care of an episodic problem, namely HTN, that was addressed and managed via longitudinal care.    RTC in 1 month if wegovy is approved, 6months if not approved        I encouraged the patient to take all medications as prescribed and to keep follow up appointments with their providers. ED precautions given for more concerning issues. Questions were invited and answered. Patient stated they had no other concerns. Follow up sooner if needed.     Girish Roberts MD  12/10/2024 1:58 PM

## 2025-01-13 ENCOUNTER — OFFICE VISIT (OUTPATIENT)
Dept: FAMILY MEDICINE | Facility: CLINIC | Age: 70
End: 2025-01-13
Payer: MEDICARE

## 2025-01-13 VITALS
WEIGHT: 265.31 LBS | SYSTOLIC BLOOD PRESSURE: 130 MMHG | HEART RATE: 90 BPM | BODY MASS INDEX: 35.16 KG/M2 | DIASTOLIC BLOOD PRESSURE: 82 MMHG | HEIGHT: 73 IN | OXYGEN SATURATION: 95 %

## 2025-01-13 DIAGNOSIS — F11.20 UNCOMPLICATED OPIOID DEPENDENCE: ICD-10-CM

## 2025-01-13 DIAGNOSIS — E66.812 CLASS 2 SEVERE OBESITY DUE TO EXCESS CALORIES WITH SERIOUS COMORBIDITY AND BODY MASS INDEX (BMI) OF 35.0 TO 35.9 IN ADULT: Primary | ICD-10-CM

## 2025-01-13 DIAGNOSIS — I48.0 PAROXYSMAL ATRIAL FIBRILLATION: ICD-10-CM

## 2025-01-13 DIAGNOSIS — M25.571 ACUTE RIGHT ANKLE PAIN: ICD-10-CM

## 2025-01-13 DIAGNOSIS — E66.01 CLASS 2 SEVERE OBESITY DUE TO EXCESS CALORIES WITH SERIOUS COMORBIDITY AND BODY MASS INDEX (BMI) OF 35.0 TO 35.9 IN ADULT: Primary | ICD-10-CM

## 2025-01-13 DIAGNOSIS — I11.0 LVH (LEFT VENTRICULAR HYPERTROPHY) DUE TO HYPERTENSIVE DISEASE, WITH HEART FAILURE: ICD-10-CM

## 2025-01-13 PROCEDURE — 1125F AMNT PAIN NOTED PAIN PRSNT: CPT | Mod: CPTII,S$GLB,, | Performed by: STUDENT IN AN ORGANIZED HEALTH CARE EDUCATION/TRAINING PROGRAM

## 2025-01-13 PROCEDURE — 99214 OFFICE O/P EST MOD 30 MIN: CPT | Mod: S$GLB,,, | Performed by: STUDENT IN AN ORGANIZED HEALTH CARE EDUCATION/TRAINING PROGRAM

## 2025-01-13 PROCEDURE — 1160F RVW MEDS BY RX/DR IN RCRD: CPT | Mod: CPTII,S$GLB,, | Performed by: STUDENT IN AN ORGANIZED HEALTH CARE EDUCATION/TRAINING PROGRAM

## 2025-01-13 PROCEDURE — 3075F SYST BP GE 130 - 139MM HG: CPT | Mod: CPTII,S$GLB,, | Performed by: STUDENT IN AN ORGANIZED HEALTH CARE EDUCATION/TRAINING PROGRAM

## 2025-01-13 PROCEDURE — 1159F MED LIST DOCD IN RCRD: CPT | Mod: CPTII,S$GLB,, | Performed by: STUDENT IN AN ORGANIZED HEALTH CARE EDUCATION/TRAINING PROGRAM

## 2025-01-13 PROCEDURE — 3079F DIAST BP 80-89 MM HG: CPT | Mod: CPTII,S$GLB,, | Performed by: STUDENT IN AN ORGANIZED HEALTH CARE EDUCATION/TRAINING PROGRAM

## 2025-01-13 PROCEDURE — G2211 COMPLEX E/M VISIT ADD ON: HCPCS | Mod: S$GLB,,, | Performed by: STUDENT IN AN ORGANIZED HEALTH CARE EDUCATION/TRAINING PROGRAM

## 2025-01-13 PROCEDURE — 1101F PT FALLS ASSESS-DOCD LE1/YR: CPT | Mod: CPTII,S$GLB,, | Performed by: STUDENT IN AN ORGANIZED HEALTH CARE EDUCATION/TRAINING PROGRAM

## 2025-01-13 PROCEDURE — 3288F FALL RISK ASSESSMENT DOCD: CPT | Mod: CPTII,S$GLB,, | Performed by: STUDENT IN AN ORGANIZED HEALTH CARE EDUCATION/TRAINING PROGRAM

## 2025-01-13 PROCEDURE — 3008F BODY MASS INDEX DOCD: CPT | Mod: CPTII,S$GLB,, | Performed by: STUDENT IN AN ORGANIZED HEALTH CARE EDUCATION/TRAINING PROGRAM

## 2025-01-13 NOTE — PROGRESS NOTES
"  [facility]    [facility]  [address] [address]  [place] [address]    Subjective     Patient ID: Gregorio Guerra is a 69 y.o. male who comes to the clinic for a follow up visit.    Chief Complaint: Follow-up    Afib - sees Dr. He, is s/p DC cardioversion in Dec 2023, is doing great now, in sinus rhythm. 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 Takes lasix 20mg daily, also on losartan, metoprolol succinate, and aldactone. Has never been hospitalized for HF exacerbation    Chronic back pain - on percocet 10mg TID, written by Dr. Billings. Also on gabapentin for neuropathy    HLD - ASCVD risk at 27%, takes crestor 20mg daily     Thrombocytopenia - platelets in the 110-130 range, also has macrocytosis. Liver u/s in March 2024 showed hepatomegaly w/ fatty infiltration but signs of cirrhosis    Fatty Liver - see thrombocytopenia above     Left hip pain - s/p replacement w/ Dr. gAuayo    Ankle pain - did not improve w/ colchicine, can't remember any trauma    ROS negative unless stated above       Objective     Vitals:    01/13/25 1303   BP: 130/82   BP Location: Left arm   Patient Position: Sitting   Pulse: 90   SpO2: 95%   Weight: 120.3 kg (265 lb 4.8 oz)   Height: 6' 1" (1.854 m)        Wt Readings from Last 3 Encounters:   01/13/25 1303 120.3 kg (265 lb 4.8 oz)   12/10/24 1401 119.7 kg (264 lb)   08/29/24 1414 119.1 kg (262 lb 8 oz)        Physical Exam  Constitutional:       General: He is not in acute distress.     Appearance: Normal appearance. He is not ill-appearing.   HENT:      Head: Normocephalic and atraumatic.      Mouth/Throat:      Mouth: Mucous membranes are moist.   Eyes:      Extraocular Movements: Extraocular movements intact.      Pupils: Pupils are equal, round, and reactive to light.   Cardiovascular:      Rate and Rhythm: Normal rate.   Pulmonary:      Effort: Pulmonary " effort is normal. No respiratory distress.   Musculoskeletal:         General: Normal range of motion.      Cervical back: Normal range of motion and neck supple.   Skin:     General: Skin is warm and dry.   Neurological:      General: No focal deficit present.      Mental Status: He is alert and oriented to person, place, and time. Mental status is at baseline.   Psychiatric:         Mood and Affect: Mood normal.         Behavior: Behavior normal.         Thought Content: Thought content normal.         Current Outpatient Medications   Medication Instructions    albuterol (VENTOLIN HFA) 90 mcg/actuation inhaler 2 puffs, Inhalation, Every 6 hours PRN, Rescue    apixaban (ELIQUIS) 5 mg, Oral, 2 times daily    colchicine (COLCRYS) 0.6 mg tablet Take 2 tablets at first sign of flare, then 0.6mg one hour after. On subsequent days, take 0.6mg daily until flare resolves    furosemide (LASIX) 20 mg, Oral, Daily    gabapentin (NEURONTIN) 300 MG capsule No dose, route, or frequency recorded.    losartan (COZAAR) 25 mg, Oral, Daily    metoprolol succinate (TOPROL-XL) 50 mg, Oral, Daily    ondansetron (ZOFRAN) 4 mg, Every 8 hours PRN    oxyCODONE-acetaminophen (PERCOCET)  mg per tablet 1 tablet    rosuvastatin (CRESTOR) 20 mg, Oral, Daily    sildenafiL (VIAGRA) 50 MG tablet TAKE 1 TABLET BY MOUTH AS NEEDED FOR ERECTILE DYSFUNCTION    spironolactone (ALDACTONE) 25 mg, Oral, Daily    WEGOVY 0.25 mg, Subcutaneous, Weekly           Assessment and Plan     1. Class 2 severe obesity due to excess calories with serious comorbidity and body mass index (BMI) of 35.0 to 35.9 in adult    2. Uncomplicated opioid dependence    3. LVH (left ventricular hypertrophy) due to hypertensive disease, with heart failure    4. Paroxysmal atrial fibrillation    5. Acute right ankle pain  -     X-Ray Ankle Complete Right; Future; Expected date: 01/13/2025        Here for follow up.    For uncomplicated opioid dependence, stable, continue  Percocet and follow up with pain management    For obesity, stable, continue we Wegovy    For LVH and heart failure, continue follow up with Cardiology, stable    For AFib, regular rhythm today, continue medications and follow up with Cardiology    For hypertension, at goal, continue regimen    I have again instructed him to do his cologuard, he promised me last time he would, he told me again today that he would    The visit today included increased complexity associated with the care of an episodic problem, namely HTN, that was addressed and managed via longitudinal care.    RTC in 2 months        I encouraged the patient to take all medications as prescribed and to keep follow up appointments with their providers. ED precautions given for more concerning issues. Questions were invited and answered. Patient stated they had no other concerns. Follow up sooner if needed.     Girish Roberts MD  01/13/2025 1:02 PM

## 2025-01-14 ENCOUNTER — HOSPITAL ENCOUNTER (OUTPATIENT)
Dept: RADIOLOGY | Facility: HOSPITAL | Age: 70
Discharge: HOME | End: 2025-01-14
Attending: STUDENT IN AN ORGANIZED HEALTH CARE EDUCATION/TRAINING PROGRAM
Payer: MEDICARE

## 2025-01-14 DIAGNOSIS — M25.571 ACUTE RIGHT ANKLE PAIN: ICD-10-CM

## 2025-01-14 PROCEDURE — 73610 X-RAY EXAM OF ANKLE: CPT | Mod: 26,RT,, | Performed by: RADIOLOGY

## 2025-01-14 PROCEDURE — 73610 X-RAY EXAM OF ANKLE: CPT | Mod: TC,RT

## 2025-01-16 DIAGNOSIS — M25.571 ACUTE RIGHT ANKLE PAIN: Primary | ICD-10-CM

## 2025-01-17 ENCOUNTER — OFFICE VISIT (OUTPATIENT)
Dept: PODIATRY | Facility: CLINIC | Age: 70
End: 2025-01-17
Payer: MEDICARE

## 2025-01-17 VITALS — WEIGHT: 268.38 LBS | HEIGHT: 73 IN | BODY MASS INDEX: 35.57 KG/M2

## 2025-01-17 DIAGNOSIS — L60.9 DISEASE OF NAIL: ICD-10-CM

## 2025-01-17 DIAGNOSIS — M25.571 ACUTE RIGHT ANKLE PAIN: ICD-10-CM

## 2025-01-17 DIAGNOSIS — G60.9 IDIOPATHIC PERIPHERAL NEUROPATHY: ICD-10-CM

## 2025-01-17 DIAGNOSIS — M72.2 PLANTAR FASCIITIS: Primary | ICD-10-CM

## 2025-01-17 DIAGNOSIS — I73.9 PERIPHERAL VASCULAR DISEASE: ICD-10-CM

## 2025-01-17 DIAGNOSIS — M79.671 RIGHT FOOT PAIN: ICD-10-CM

## 2025-01-17 PROCEDURE — 3008F BODY MASS INDEX DOCD: CPT | Mod: CPTII,S$GLB,, | Performed by: PODIATRIST

## 2025-01-17 PROCEDURE — 1125F AMNT PAIN NOTED PAIN PRSNT: CPT | Mod: CPTII,S$GLB,, | Performed by: PODIATRIST

## 2025-01-17 PROCEDURE — 1160F RVW MEDS BY RX/DR IN RCRD: CPT | Mod: CPTII,S$GLB,, | Performed by: PODIATRIST

## 2025-01-17 PROCEDURE — 99203 OFFICE O/P NEW LOW 30 MIN: CPT | Mod: 25,S$GLB,, | Performed by: PODIATRIST

## 2025-01-17 PROCEDURE — 11721 DEBRIDE NAIL 6 OR MORE: CPT | Mod: Q9,S$GLB,, | Performed by: PODIATRIST

## 2025-01-17 PROCEDURE — 1159F MED LIST DOCD IN RCRD: CPT | Mod: CPTII,S$GLB,, | Performed by: PODIATRIST

## 2025-01-17 NOTE — PROGRESS NOTES
Subjective:     Patient ID: Gregorio Guerra is a 69 y.o. male    Chief Complaint: Foot Pain       Gregorio is a 69 y.o. male who presents to the podiatry clinic  with complaint of  {right/left/bi:40631} foot pain. Onset of the symptoms was {onset:93806}. Precipitating event: {causes:73917}. Current symptoms include: {symptoms:82040}. Aggravating factors: {aggravating factors:61931}. Symptoms have {symptom progression:05978}. Patient {has had:17182} prior foot problems. Evaluation to date: {evaluation:14090}. Treatment to date: {treatment:14089}. Patients rates pain {GEN PAIN SCALE HI:18096} on pain scale.    ROS     Objective:   Physical Exam   Foot Exam     Assessment:         1. Acute right ankle pain       Plan:     Gregorio Guerra was seen today for   Chief Complaint   Patient presents with    Foot Pain       Assessment & Plan           Procedures     {PROCEDURES:15091}      Radha Romano DPM  [address] [place] [address]  [phone]      This note was created using [employer] direct voice recognition software. Note may have occasional typographical errors that may not have been identified and edited despite initial review prior to signing.

## 2025-01-28 ENCOUNTER — HOSPITAL ENCOUNTER (OUTPATIENT)
Facility: HOSPITAL | Age: 70
Discharge: HOME | End: 2025-01-28
Attending: ANESTHESIOLOGY | Admitting: ANESTHESIOLOGY
Payer: MEDICARE

## 2025-01-28 VITALS
WEIGHT: 268 LBS | DIASTOLIC BLOOD PRESSURE: 80 MMHG | RESPIRATION RATE: 12 BRPM | HEART RATE: 71 BPM | BODY MASS INDEX: 35.52 KG/M2 | OXYGEN SATURATION: 95 % | HEIGHT: 73 IN | SYSTOLIC BLOOD PRESSURE: 126 MMHG | TEMPERATURE: 98 F

## 2025-01-28 DIAGNOSIS — M53.3 SACROILIAC JOINT DYSFUNCTION: Primary | ICD-10-CM

## 2025-01-28 PROCEDURE — 25500020 PHARM REV CODE 255: Performed by: ANESTHESIOLOGY

## 2025-01-28 PROCEDURE — 99152 MOD SED SAME PHYS/QHP 5/>YRS: CPT | Performed by: ANESTHESIOLOGY

## 2025-01-28 PROCEDURE — 27096 INJECT SACROILIAC JOINT: CPT | Mod: LT | Performed by: ANESTHESIOLOGY

## 2025-01-28 PROCEDURE — 63600175 PHARM REV CODE 636 W HCPCS: Mod: JZ,TB | Performed by: ANESTHESIOLOGY

## 2025-01-28 RX ORDER — LIDOCAINE HYDROCHLORIDE 10 MG/ML
INJECTION, SOLUTION INFILTRATION; PERINEURAL
Status: DISCONTINUED | OUTPATIENT
Start: 2025-01-28 | End: 2025-01-28 | Stop reason: HOSPADM

## 2025-01-28 RX ORDER — MIDAZOLAM HYDROCHLORIDE 5 MG/ML
INJECTION INTRAMUSCULAR; INTRAVENOUS
Status: DISCONTINUED | OUTPATIENT
Start: 2025-01-28 | End: 2025-01-28 | Stop reason: HOSPADM

## 2025-01-28 RX ORDER — SODIUM CHLORIDE, SODIUM LACTATE, POTASSIUM CHLORIDE, CALCIUM CHLORIDE 600; 310; 30; 20 MG/100ML; MG/100ML; MG/100ML; MG/100ML
INJECTION, SOLUTION INTRAVENOUS CONTINUOUS
Status: DISCONTINUED | OUTPATIENT
Start: 2025-01-28 | End: 2025-01-28 | Stop reason: HOSPADM

## 2025-01-28 RX ORDER — BUPIVACAINE HYDROCHLORIDE 5 MG/ML
INJECTION, SOLUTION EPIDURAL; INTRACAUDAL
Status: DISCONTINUED | OUTPATIENT
Start: 2025-01-28 | End: 2025-01-28 | Stop reason: HOSPADM

## 2025-01-28 RX ORDER — METHYLPREDNISOLONE ACETATE 80 MG/ML
INJECTION, SUSPENSION INTRA-ARTICULAR; INTRALESIONAL; INTRAMUSCULAR; SOFT TISSUE
Status: DISCONTINUED | OUTPATIENT
Start: 2025-01-28 | End: 2025-01-28 | Stop reason: HOSPADM

## 2025-01-28 RX ORDER — FENTANYL CITRATE 50 UG/ML
INJECTION, SOLUTION INTRAMUSCULAR; INTRAVENOUS
Status: DISCONTINUED | OUTPATIENT
Start: 2025-01-28 | End: 2025-01-28 | Stop reason: HOSPADM

## 2025-01-28 NOTE — DISCHARGE SUMMARY
[facility]  Discharge Note  Short Stay    Procedure(s) (LRB):  SI JOINT INJECTION LEFT (Left)      OUTCOME: Patient tolerated treatment/procedure well without complication and is now ready for discharge.    DISPOSITION: Home or Self Care    FINAL DIAGNOSIS: Sacroiliac joint dysfunction    FOLLOWUP: In clinic    DISCHARGE INSTRUCTIONS:    Discharge Procedure Orders   Diet general     Call MD for:  temperature >100.4     Call MD for:  persistent nausea and vomiting     Call MD for:  severe uncontrolled pain     Call MD for:  difficulty breathing, headache or visual disturbances     Call MD for:  redness, tenderness, or signs of infection (pain, swelling, redness, odor or green/yellow discharge around incision site)     Call MD for:  hives     Call MD for:  persistent dizziness or light-headedness     Call MD for:  extreme fatigue        TIME SPENT ON DISCHARGE: 30 minutes

## 2025-01-28 NOTE — DISCHARGE INSTRUCTIONS
Dr. Billings [phone] extension [phone]  [facility] [phone]  [facility] [phone]      You may remove the dressing tomorrow.  You may shower after the dressing is removed.  Do not submerge in any water for the next 48 hours. (Bath, swimming, hot tub etc.)

## 2025-01-28 NOTE — OP NOTE
Procedure Date: 1/28/2025    PROCEDURE:  Left sacroiliac joint injection utilizing fluoroscopy.    DIAGNOSIS: Left sided sacroiliitis.  Post op diagnosis: same    PHYSICIAN: Lita Billings MD    MEDICATIONS INJECTED:  Methylprednisone 80 mg and 2 ml Bupivacaine 0.5%.    LOCAL ANESTHETIC USED: Lidocaine 1%,5 ml total.     SEDATION MEDICATIONS: RN IV sedation    ESTIMATED BLOOD LOSS: none    COMPLICATIONS: none    TECHNIQUE:   Time-out taken to identify patient and procedure side prior to starting the procedure. After placing the patient in the prone position, the patient was prepped and draped in the usual sterile fashion using ChloraPrep and sterile towels.  The area was determined under fluoroscopy in the AP view.  Lidocaine was injected by raising a wheal and going down to the periosteum using a 25-gauge 1.5 inch needle.  The 3.5 inch 22-gauge spinal needle was introduced into inferior opening of the left sacroiliac joint.  Negative pressure applied to confirm no intravascular placement.  2 ml of contrast dye was injected to confirm placement and to confirm that there was no vascular uptake. The medication was then injected slowly. The patient tolerated the procedure well.    The patient was monitored after the procedure.  The patient was given post procedure and discharge instructions to follow at home. The patient was discharged in a stable condition

## 2025-01-28 NOTE — H&P
"FOLLOW UP NOTE:     CHIEF COMPLAINT: back and hip pain    INTERVAL HISTORY OF PRESENT ILLNESS: Gregorio Guerra is a 69 y.o. male who presents for SI JOINT INJECTION LEFT . The patient denies of any significant changes in his health since his last appointment. The patient also denies of any changes in the character of his pain since his last appointment.     ROS:  Review of Systems   Constitutional:  Negative for chills and fever.   HENT:  Negative for tinnitus.    Eyes:  Negative for visual disturbance.   Respiratory:  Negative for shortness of breath.    Cardiovascular:  Negative for chest pain.   Gastrointestinal:  Negative for nausea and vomiting.   Genitourinary:  Negative for difficulty urinating.   Musculoskeletal:  Positive for arthralgias and back pain.   Skin:  Negative for rash.   Allergic/Immunologic: Negative for immunocompromised state.   Neurological:  Negative for syncope.   Hematological:  Does not bruise/bleed easily.   Psychiatric/Behavioral:  Negative for suicidal ideas.         MEDICAL, SURGICAL, FAMILY, SOCIAL HX: reviewed    MEDICATIONS/ALLERGIES: reviewed    PHYSICAL EXAM:    VITALS: Vitals reviewed.   Vitals:    01/28/25 1035   BP: (!) 170/84   Resp: 18   TempSrc: Temporal   SpO2: 97%   Weight: 121.6 kg (268 lb)   Height: 6' 1" (1.854 m)       Physical Exam  Vitals and nursing note reviewed.   Constitutional:       Appearance: Normal appearance. He is not toxic-appearing or diaphoretic.   HENT:      Head: Normocephalic and atraumatic.   Eyes:      General:         Right eye: No discharge.         Left eye: No discharge.      Extraocular Movements: Extraocular movements intact.      Conjunctiva/sclera: Conjunctivae normal.   Cardiovascular:      Rate and Rhythm: Normal rate.   Pulmonary:      Effort: Pulmonary effort is normal. No respiratory distress.      Breath sounds: Normal breath sounds.   Abdominal:      Palpations: Abdomen is soft.   Skin:     General: Skin is warm and dry.      " Findings: No rash.   Neurological:      Mental Status: He is alert and oriented to person, place, and time.   Psychiatric:         Mood and Affect: Mood and affect normal.         Cognition and Memory: Memory normal.         Judgment: Judgment normal.          EXTREMITIES:    Gen: No cyanosis, edema, varicosities, or tenderness to palpation BLE   Skin: Warm, pink, dry, no rashes, no lesions BLE   Strength: 5/5 motor strength BLE   ROM: hips, knees and ankles without pain or instability.     NEUROLOGICAL:    Gen: No clonus or spasticity.   Gait: Normal without antalgic lean   DTR's: 2+ in bilateral patellar, and ankle   BABINSKI: Absent bilaterally  Sensory: Intact to light touch and proprioception BLE    ASSESSMENT: Gregorio Guerra is a 69 y.o. male who presents for SI JOINT INJECTION LEFT .     PLAN:  Proceed with SI JOINT INJECTION LEFT  as previously discussed.    This patient has been cleared for surgery in an ambulatory surgical facility    ASA 3,  Mallampatti Score 3  No history of anesthetic complications  Plan for RN IV sedation    Lita Billings MD  Pain Management

## 2025-02-03 NOTE — PROGRESS NOTES
Subjective:     Patient ID: Gregorio Guerra is a 69 y.o. male    Chief Complaint: Foot Pain       Gregorio is a 69 y.o. male who presents to the clinic for evaluation and treatment of high risk feet. Gregorio has a past medical history of A-fib, Abdominal obesity (10/26/2023), Arthritis, At risk for cardiovascular event, ASCVD 10-year risk 27.2%, 11/2023 (02/29/2024), Chronic fatigue (03/23/2021), Current use of long term anticoagulation (02/01/2019), Elevated AST (SGOT) (02/29/2024), Elevated blood sugar (02/29/2024), Excessive daytime sleepiness (10/26/2023), Hypertension, LVH (left ventricular hypertrophy) due to hypertensive disease, with heart failure (02/06/2024), Nonspecific abnormal electrocardiogram (ECG) (EKG) (03/23/2021), Paroxysmal A-fib (06/26/2020), Personal history of nicotine dependence (03/01/2024), Polypharmacy (02/29/2024), Postoperative state (11/20/2018), Primary osteoarthritis of right hip (10/05/2018), Sedentary lifestyle (02/29/2024), Septic olecranon bursitis, left (06/14/2021), Sleep apnea, Snoring (10/26/2023), and SOB (shortness of breath) (10/26/2023). The patient's chief complaint is long, thick toenails. This patient has documented high risk feet requiring routine maintenance secondary to peripheral vascular disease.  Patient also complains of pain and tenderness in right foot after periods of inactivity.    PCP: Girish Roberts MD    Date Last Seen by PCP:  01/13/2025    Current shoe gear:  Affected Foot: Tennis shoes     Unaffected Foot: Tennis shoes    Last encounter in this department: Visit date not found    Hemoglobin A1C   Date Value Ref Range Status   06/13/2024 5.5 4.0 - 5.6 % Final     Comment:     ADA Screening Guidelines:  5.7-6.4%  Consistent with prediabetes  >or=6.5%  Consistent with diabetes    High levels of fetal hemoglobin interfere with the HbA1C  assay. Heterozygous hemoglobin variants (HbS, HgC, etc)do  not significantly interfere with this assay.   However,  presence of multiple variants may affect accuracy.         Review of Systems   Constitutional: Negative.  Negative for chills and fever.   Respiratory:  Negative for cough and shortness of breath.    Cardiovascular:  Positive for leg swelling. Negative for chest pain.   Gastrointestinal:  Negative for diarrhea, nausea and vomiting.   Neurological:  Positive for tingling.        Objective:   Physical Exam  Vitals reviewed.   Constitutional:       General: He is not in acute distress.     Appearance: Normal appearance. He is not ill-appearing.   HENT:      Head: Normocephalic.      Nose: Nose normal.   Cardiovascular:      Pulses:           Dorsalis pedis pulses are 1+ on the right side and 1+ on the left side.        Posterior tibial pulses are 1+ on the right side and 1+ on the left side.   Pulmonary:      Effort: Pulmonary effort is normal. No respiratory distress.   Skin:     Capillary Refill: Capillary refill takes 2 to 3 seconds.   Neurological:      Mental Status: He is alert and oriented to person, place, and time.   Psychiatric:         Mood and Affect: Mood normal.         Behavior: Behavior normal.         Thought Content: Thought content normal.        Foot Exam    General  General Appearance: appears stated age and healthy   Orientation: alert and oriented to person, place, and time       Right Foot/Ankle     Inspection and Palpation  Tenderness: plantar fascia   Swelling: plantar fascia   Skin Exam: skin changes and abnormal color;     Neurovascular  Dorsalis pedis: 1+  Posterior tibial: 1+  Tibial nerve sensation: diminished  Superficial peroneal nerve sensation: diminished  Deep peroneal nerve sensation: diminished      Left Foot/Ankle      Inspection and Palpation  Skin Exam: skin changes and abnormal color;     Neurovascular  Dorsalis pedis: 1+  Posterior tibial: 1+      Dermatologic: Nails 1 through 5 bilateral thick elongated discolored subungual debris   Neurologic:  Positive paresthesias  reported, positive burning reported right side greater than left   Vascular: +1 nonpitting edema noted bilateral ankle right greater than left, pedal hair growth is absent bilateral lower extremity, soft tissue atrophic skin changes noted including hyperpigmentation      Assessment:         1. Plantar fasciitis    2. Acute right ankle pain    3. Right foot pain    4. Peripheral vascular disease    5. Disease of nail    6. Idiopathic peripheral neuropathy       Plan:     Gregorio Guerra was seen today for   Chief Complaint   Patient presents with    Foot Pain       Assessment & Plan           Routine Foot Care    Date/Time: 1/17/2025 2:30 PM    Performed by: Casey Romano DPM  Authorized by: Casey Romano DPM    Consent Done?:  Yes (Verbal)    Nail Care Type:  Debride  Location(s): All  (Left 1st Toe, Left 3rd Toe, Left 2nd Toe, Left 4th Toe, Left 5th Toe, Right 1st Toe, Right 2nd Toe, Right 3rd Toe, Right 4th Toe and Right 5th Toe)  Patient tolerance:  Patient tolerated the procedure well with no immediate complications       1. Patient was examined and evaluated.    2. Discussed with patient etiology of peripheral vascular disease.  Patient was advised elevate lower extremity rest consider daily use of compression stockings.  Patient will monitor dietary salt intake and water consumption.    3. Discussed with patient the etiology of nail fungus and as possible secondary issue to prior nail trauma.  Discussed with patient OTC topical conservative treatments such as Vicks vapor rub, tea tree oil as well as coconut oil.  Discussed with patient risks and benefits oral Lamisil therapy.   4. Discussed with patient etiology of plantar fasciitis.  Conservative treatment options were discussed in detail with the patient.  Stretching instructions were demonstrated for the patient.  Stretching instructions were then printed and dispensed to the patient for home reference.  Discussed with patient possible  benefits of local corticosteroid injection or oral Medrol Dosepak should pain complaints worsen over time.  Patient was advised to adjunct with OTC analgesics/NSAIDs for pain relief.  Patient was advised to ice and elevate the lower extremity at end of days.  5. Discussed with patient nerve related changes to the right lower extremity.  Patient made aware that it maybe related to the current hip pathology in prior lumbar radiculopathy.  Patient will continue with oral Neurontin.  6. Patient will follow up in 3-4 weeks or p.r.n. for complaints      Radha Romano DPM  [address] [address] [address]  [phone]      This note was created using [employer] direct voice recognition software. Note may have occasional typographical errors that may not have been identified and edited despite initial review prior to signing.

## 2025-02-12 ENCOUNTER — PATIENT MESSAGE (OUTPATIENT)
Dept: FAMILY MEDICINE | Facility: CLINIC | Age: 70
End: 2025-02-12
Payer: MEDICARE

## 2025-02-27 ENCOUNTER — OFFICE VISIT (OUTPATIENT)
Dept: PODIATRY | Facility: CLINIC | Age: 70
End: 2025-02-27
Payer: MEDICARE

## 2025-02-27 VITALS — WEIGHT: 267.63 LBS | BODY MASS INDEX: 35.47 KG/M2 | HEIGHT: 73 IN

## 2025-02-27 DIAGNOSIS — M72.2 PLANTAR FASCIITIS: Primary | ICD-10-CM

## 2025-02-27 DIAGNOSIS — I73.9 PERIPHERAL VASCULAR DISEASE: ICD-10-CM

## 2025-02-27 DIAGNOSIS — R26.2 DIFFICULTY WALKING: ICD-10-CM

## 2025-02-27 DIAGNOSIS — M79.671 RIGHT FOOT PAIN: ICD-10-CM

## 2025-02-27 RX ORDER — DEXAMETHASONE SODIUM PHOSPHATE 4 MG/ML
4 INJECTION, SOLUTION INTRA-ARTICULAR; INTRALESIONAL; INTRAMUSCULAR; INTRAVENOUS; SOFT TISSUE
Status: DISCONTINUED | OUTPATIENT
Start: 2025-02-27 | End: 2025-02-27 | Stop reason: HOSPADM

## 2025-02-27 RX ORDER — LIDOCAINE HYDROCHLORIDE 10 MG/ML
1 INJECTION, SOLUTION INFILTRATION; PERINEURAL
Status: DISCONTINUED | OUTPATIENT
Start: 2025-02-27 | End: 2025-02-27 | Stop reason: HOSPADM

## 2025-02-27 RX ADMIN — LIDOCAINE HYDROCHLORIDE 1 ML: 10 INJECTION, SOLUTION INFILTRATION; PERINEURAL at 02:02

## 2025-02-27 RX ADMIN — DEXAMETHASONE SODIUM PHOSPHATE 4 MG: 4 INJECTION, SOLUTION INTRA-ARTICULAR; INTRALESIONAL; INTRAMUSCULAR; INTRAVENOUS; SOFT TISSUE at 02:02

## 2025-02-27 NOTE — PROGRESS NOTES
Subjective:     Patient ID: Gregorio Guerra is a 69 y.o. male    Chief Complaint: Foot Pain       Gregorio is a 69 y.o. male who presents to the clinic complaining of heel pain in the right foot, especially with the first step in the morning. The pain is described as Grabbing, Tight, and Variable. The onset of the pain was gradual and has improved in some ways and worsened in others over the past several weeks. Gregorio rates the pain as 6/10. He denies a history of trauma. Prior treatments include stretching and icing and local corticosteroid injection.    Review of Systems   Constitutional: Negative.  Negative for chills and fever.   Respiratory:  Negative for cough and shortness of breath.    Cardiovascular:  Positive for leg swelling. Negative for chest pain.   Gastrointestinal:  Negative for diarrhea, nausea and vomiting.   Neurological:  Positive for tingling.        Objective:   Physical Exam  Vitals reviewed.   Constitutional:       General: He is not in acute distress.     Appearance: Normal appearance. He is not ill-appearing.   HENT:      Head: Normocephalic.      Nose: Nose normal.   Cardiovascular:      Pulses:           Dorsalis pedis pulses are 2+ on the right side and 2+ on the left side.        Posterior tibial pulses are 1+ on the right side and 1+ on the left side.   Pulmonary:      Effort: Pulmonary effort is normal. No respiratory distress.   Skin:     Capillary Refill: Capillary refill takes 2 to 3 seconds.   Neurological:      Mental Status: He is alert and oriented to person, place, and time.   Psychiatric:         Mood and Affect: Mood normal.         Behavior: Behavior normal.         Thought Content: Thought content normal.        Foot Exam    General  General Appearance: appears stated age and healthy   Orientation: alert and oriented to person, place, and time   Affect: appropriate   Gait: antalgic       Right Foot/Ankle     Inspection and Palpation  Tenderness: plantar fascia    Swelling: dorsum and plantar fascia   Skin Exam: skin changes and abnormal color;     Neurovascular  Dorsalis pedis: 2+  Posterior tibial: 1+    Muscle Strength  Ankle dorsiflexion: 5  Ankle plantar flexion: 5  Ankle inversion: 5  Ankle eversion: 5  Great toe extension: 5  Great toe flexion: 5      Left Foot/Ankle      Inspection and Palpation  Swelling: dorsum   Skin Exam: skin changes and abnormal color;     Neurovascular  Dorsalis pedis: 2+  Posterior tibial: 1+    Muscle Strength  Ankle dorsiflexion: 5  Ankle plantar flexion: 5  Ankle inversion: 5  Ankle eversion: 5  Great toe extension: 5  Great toe flexion: 5           Assessment:         1. Plantar fasciitis    2. Right foot pain    3. Peripheral vascular disease    4. Difficulty walking       Plan:     Gregorio LICONA Charlie was seen today for   Chief Complaint   Patient presents with    Foot Pain       Assessment & Plan           Tendon Sheath: R plantar fascia    Date/Time: 2/27/2025 2:00 PM    Performed by: Casey Romano DPM  Authorized by: Casey Romano DPM    Consent Done?:  Yes (Verbal)  Indications:  Pain  Location:  Foot  Site:  R plantar fascia  Ultrasonic guidance for needle placement?: No    Needle size:  25 G  Approach:  Medial  Medications:  1 mL LIDOcaine HCL 10 mg/ml (1%) 10 mg/mL (1 %); 4 mg dexAMETHasone 4 mg/mL  Patient tolerance:  Patient tolerated the procedure well with no immediate complications       1. Patient was examined and evaluated.    2. Discussed with patient etiology of peripheral vascular disease.  Patient was advised elevate lower extremity rest consider daily use of compression stockings.  Patient will monitor dietary salt intake and water consumption.    3. Again discussed with patient current plantar fasciitis symptoms.  Patient was advised to continue with the stretching and icing and daily use of comfortable shoe gear.  Discussed with patient potential benefits of repeat local corticosteroid injection oral  Medrol Dosepak pain relief.  Patient was advised to adjunct with OTC analgesics/NSAIDs for pain relief if appropriate.  Patient will decrease over amount of activity.  Patient will limit the use of slides flip-flops and barefoot walking.  4. Patient will follow-up in 2-3 weeks or p.r.n. for complaints      Radha Romano DPM  [address] [place] [address]  [phone]      This note was created using [employer] direct voice recognition software. Note may have occasional typographical errors that may not have been identified and edited despite initial review prior to signing.

## 2025-03-03 DIAGNOSIS — Z12.11 COLON CANCER SCREENING: Primary | ICD-10-CM

## 2025-03-06 ENCOUNTER — OFFICE VISIT (OUTPATIENT)
Dept: CARDIOLOGY | Facility: CLINIC | Age: 70
End: 2025-03-06
Payer: MEDICARE

## 2025-03-06 VITALS
DIASTOLIC BLOOD PRESSURE: 69 MMHG | HEIGHT: 73 IN | SYSTOLIC BLOOD PRESSURE: 117 MMHG | WEIGHT: 269.69 LBS | OXYGEN SATURATION: 97 % | HEART RATE: 79 BPM | BODY MASS INDEX: 35.74 KG/M2

## 2025-03-06 DIAGNOSIS — Z79.01 CURRENT USE OF LONG TERM ANTICOAGULATION: ICD-10-CM

## 2025-03-06 DIAGNOSIS — I10 PRIMARY HYPERTENSION: ICD-10-CM

## 2025-03-06 DIAGNOSIS — I50.32 CHRONIC HEART FAILURE WITH PRESERVED EJECTION FRACTION: ICD-10-CM

## 2025-03-06 DIAGNOSIS — F10.10 EXCESSIVE DRINKING ALCOHOL: ICD-10-CM

## 2025-03-06 DIAGNOSIS — I48.0 PAROXYSMAL ATRIAL FIBRILLATION: ICD-10-CM

## 2025-03-06 DIAGNOSIS — F11.20 UNCOMPLICATED OPIOID DEPENDENCE: ICD-10-CM

## 2025-03-06 DIAGNOSIS — E66.01 CLASS 2 SEVERE OBESITY DUE TO EXCESS CALORIES WITH SERIOUS COMORBIDITY AND BODY MASS INDEX (BMI) OF 35.0 TO 35.9 IN ADULT: ICD-10-CM

## 2025-03-06 DIAGNOSIS — R94.39 ABNORMAL NUCLEAR STRESS TEST: ICD-10-CM

## 2025-03-06 DIAGNOSIS — R07.2 PRECORDIAL CHEST PAIN: Primary | ICD-10-CM

## 2025-03-06 DIAGNOSIS — E78.2 MIXED HYPERLIPIDEMIA: ICD-10-CM

## 2025-03-06 DIAGNOSIS — G47.33 OSA (OBSTRUCTIVE SLEEP APNEA): ICD-10-CM

## 2025-03-06 DIAGNOSIS — F12.10 TETRAHYDROCANNABINOL (THC) USE DISORDER, MILD, ABUSE: ICD-10-CM

## 2025-03-06 DIAGNOSIS — E66.812 CLASS 2 SEVERE OBESITY DUE TO EXCESS CALORIES WITH SERIOUS COMORBIDITY AND BODY MASS INDEX (BMI) OF 35.0 TO 35.9 IN ADULT: ICD-10-CM

## 2025-03-06 DIAGNOSIS — K76.0 FATTY LIVER: ICD-10-CM

## 2025-03-06 DIAGNOSIS — F17.200 TOBACCO USE DISORDER: ICD-10-CM

## 2025-03-06 PROCEDURE — 99999 PR PBB SHADOW E&M-EST. PATIENT-LVL III: CPT | Mod: PBBFAC,,, | Performed by: INTERNAL MEDICINE

## 2025-03-06 PROCEDURE — 1101F PT FALLS ASSESS-DOCD LE1/YR: CPT | Mod: CPTII,S$GLB,, | Performed by: INTERNAL MEDICINE

## 2025-03-06 PROCEDURE — 3288F FALL RISK ASSESSMENT DOCD: CPT | Mod: CPTII,S$GLB,, | Performed by: INTERNAL MEDICINE

## 2025-03-06 PROCEDURE — 1125F AMNT PAIN NOTED PAIN PRSNT: CPT | Mod: CPTII,S$GLB,, | Performed by: INTERNAL MEDICINE

## 2025-03-06 PROCEDURE — 3008F BODY MASS INDEX DOCD: CPT | Mod: CPTII,S$GLB,, | Performed by: INTERNAL MEDICINE

## 2025-03-06 PROCEDURE — 1159F MED LIST DOCD IN RCRD: CPT | Mod: CPTII,S$GLB,, | Performed by: INTERNAL MEDICINE

## 2025-03-06 PROCEDURE — 3074F SYST BP LT 130 MM HG: CPT | Mod: CPTII,S$GLB,, | Performed by: INTERNAL MEDICINE

## 2025-03-06 PROCEDURE — 1160F RVW MEDS BY RX/DR IN RCRD: CPT | Mod: CPTII,S$GLB,, | Performed by: INTERNAL MEDICINE

## 2025-03-06 PROCEDURE — 3078F DIAST BP <80 MM HG: CPT | Mod: CPTII,S$GLB,, | Performed by: INTERNAL MEDICINE

## 2025-03-06 PROCEDURE — 99215 OFFICE O/P EST HI 40 MIN: CPT | Mod: S$GLB,,, | Performed by: INTERNAL MEDICINE

## 2025-03-06 NOTE — PROGRESS NOTES
Subjective:    Patient ID:  Gregorio Guerra is a 69 y.o. male who presents for evaluation of No chief complaint on file.  Came on own for PAF, post DCC 12/13/2023, smoker down to 3 cpd, high ASCVD 10-year event risk of over 27% on statin, 3-months review. Now on Wegovy since 12/2024, used 1 month and cost increased to $850 a month.  PCP: Girish Roberts MD    Prior cardiologist: Leonel Velasco MD , last seen 2/2023  Pain: Lita Billings MD   Lives with wife, Scar, outdoor smoker of marijuana  Retired [employer],     Social Determinate of Health: Do you have any problem with the following: none, 40 minutes drive from [employer]  Health Literacy (understanding): high  Vaccination: up to date yes, covid vaccine yes, covid infection 2022, no lasting effects  Activities: more active post THR, limited by CLBP, left buttock  Nicotine: smoker  (1 pp week x 50 yrs), started age 13, now a pack a week, off Chantex,   Alcohol: 80 proof liquor, max 4 shots in any 24 hours, 3 times weekly.  Illicit Drugs: now and then THC gummies, Rx opioid tid for over 2 years.  Cardiac Symptoms: CP  Home BP: 120 to 130/60s to 70s  Medication Compliance: yes, do not skipped Eliquis now  Diet: Regular, use salt   Caffeine: Coffee 1 and 1/2 cup   Labs:   Lab Results   Component Value Date    TSH 2.930 11/09/2023        Lab Results   Component Value Date    HGBA1C 5.5 06/13/2024       Lab Results   Component Value Date    WBC 7.1 07/25/2024    HGB 11.9 (L) 07/30/2024    HCT 34.9 (L) 07/30/2024    MCV 99.8 (H) 07/25/2024     (L) 07/25/2024       CMP  Sodium   Date Value Ref Range Status   06/13/2024 139 136 - 145 mmol/L Final   11/09/2023 137 136 - 145 mmol/L Final   06/16/2021 137 136 - 145 mmol/L Final   10/17/2018 138 134 - 144 mmol/L      Potassium   Date Value Ref Range Status   06/13/2024 4.1 3.5 - 5.1 mmol/L Final   11/09/2023 4.6 3.5 - 5.1 mmol/L Final   06/16/2021 3.9 3.5 - 5.1 mmol/L Final      Chloride   Date Value Ref Range Status   06/13/2024 104 95 - 110 mmol/L Final   11/09/2023 104 95 - 110 mmol/L Final   06/16/2021 103 95 - 110 mmol/L Final   10/17/2018 105 98 - 110 mmol/L      CO2   Date Value Ref Range Status   06/13/2024 24 23 - 29 mmol/L Final   11/09/2023 21 (L) 23 - 29 mmol/L Final   06/16/2021 25 23 - 29 mmol/L Final     Glucose   Date Value Ref Range Status   06/13/2024 107 70 - 110 mg/dL Final   11/09/2023 139 (H) 70 - 110 mg/dL Final   06/16/2021 108 70 - 110 mg/dL Final   10/17/2018 98 70 - 99 mg/dL      BUN   Date Value Ref Range Status   06/13/2024 13 8 - 23 mg/dL Final   11/09/2023 12 8 - 23 mg/dL Final   06/16/2021 11 8 - 23 mg/dL Final     Creatinine   Date Value Ref Range Status   06/13/2024 1.0 0.5 - 1.4 mg/dL Final   11/09/2023 1.1 0.5 - 1.4 mg/dL Final   06/16/2021 0.9 0.5 - 1.4 mg/dL Final   10/17/2018 1.06 0.60 - 1.40 mg/dL      Calcium   Date Value Ref Range Status   06/13/2024 9.5 8.7 - 10.5 mg/dL Final   11/09/2023 9.3 8.7 - 10.5 mg/dL Final   06/16/2021 8.5 (L) 8.7 - 10.5 mg/dL Final     Total Protein   Date Value Ref Range Status   06/13/2024 7.7 6.0 - 8.4 g/dL Final   11/09/2023 7.4 6.0 - 8.4 g/dL Final   06/16/2021 6.6 6.0 - 8.4 g/dL Final     Albumin   Date Value Ref Range Status   06/13/2024 4.0 3.5 - 5.2 g/dL Final   11/09/2023 3.9 3.5 - 5.2 g/dL Final   06/16/2021 3.5 3.5 - 5.2 g/dL Final   10/17/2018 4.2 3.1 - 4.7 g/dL      Total Bilirubin   Date Value Ref Range Status   06/13/2024 1.0 0.1 - 1.0 mg/dL Final     Comment:     For infants and newborns, interpretation of results should be based  on gestational age, weight and in agreement with clinical  observations.    Premature Infant recommended reference ranges:  Up to 24 hours.............<8.0 mg/dL  Up to 48 hours............<12.0 mg/dL  3-5 days..................<15.0 mg/dL  6-29 days.................<15.0 mg/dL     11/09/2023 1.1 (H) 0.1 - 1.0 mg/dL Final     Comment:     For infants and newborns,  interpretation of results should be based  on gestational age, weight and in agreement with clinical  observations.    Premature Infant recommended reference ranges:  Up to 24 hours.............<8.0 mg/dL  Up to 48 hours............<12.0 mg/dL  3-5 days..................<15.0 mg/dL  6-29 days.................<15.0 mg/dL     06/16/2021 1.1 (H) 0.1 - 1.0 mg/dL Final     Comment:     For infants and newborns, interpretation of results should be based  on gestational age, weight and in agreement with clinical  observations.    Premature Infant recommended reference ranges:  Up to 24 hours.............<8.0 mg/dL  Up to 48 hours............<12.0 mg/dL  3-5 days..................<15.0 mg/dL  6-29 days.................<15.0 mg/dL       Alkaline Phosphatase   Date Value Ref Range Status   06/13/2024 114 55 - 135 U/L Final   11/09/2023 82 55 - 135 U/L Final   06/16/2021 70 55 - 135 U/L Final     AST   Date Value Ref Range Status   06/13/2024 60 (H) 10 - 40 U/L Final   11/09/2023 46 (H) 10 - 40 U/L Final   06/16/2021 22 10 - 40 U/L Final     ALT   Date Value Ref Range Status   06/13/2024 57 (H) 10 - 44 U/L Final   11/09/2023 38 10 - 44 U/L Final   06/16/2021 32 10 - 44 U/L Final     Anion Gap   Date Value Ref Range Status   06/13/2024 11 8 - 16 mmol/L Final   11/09/2023 12 8 - 16 mmol/L Final   06/16/2021 9 8 - 16 mmol/L Final     eGFR   Date Value Ref Range Status   06/13/2024 >60.0 >60 mL/min/1.73 m^2 Final   11/09/2023 >60.0 >60 mL/min/1.73 m^2 Final     @labrcntip(troponini)@    BNP   Date Value Ref Range Status   11/09/2023 193 (H) 0 - 99 pg/mL Final     Comment:     Values of less than 100 pg/ml are consistent with non-CHF populations.   }   Lab Results   Component Value Date    CHOL 170 06/13/2024    CHOL 240 (H) 11/09/2023    CHOL 174 08/16/2005     Lab Results   Component Value Date    HDL 64 06/13/2024    HDL 56 11/09/2023    HDL 52.0 08/16/2005     Lab Results   Component Value Date    LDLCALC 67.0 06/13/2024     "LDLCALC 157.4 11/09/2023    LDLCALC 106.2 08/16/2005     Lab Results   Component Value Date    TRIG 195 (H) 06/13/2024    TRIG 133 11/09/2023    TRIG 79 08/16/2005     Lab Results   Component Value Date    CHOLHDL 37.6 06/13/2024    CHOLHDL 23.3 11/09/2023    CHOLHDL 29.9 08/16/2005      Last DCC: 12/13/2023  Last Echo: 4/2021  Last stress test: Lexiscan 4/2021  Cardiovascular angiogram: 2018, "no blockages"  ECG: NSR rate 79, low voltage, RBBB, QTc 481 msec,   Fundoscopic exam: due    In 10/2023:  WM, came on own to establish CV care for PAF with chronic fatigue. Does not feel the AF and active smoking and heavy alcohol use due to chronic pains. Anticipating back procedure on 11/6/2023. Very limited with CLBP along with chronic SOB. Denies any CP had angiogram in 2018 and told of no blockages.    Leonel Velasco MD noted 2/2023 "67 y.o. male is here for follow-up.       1. Chronic heart failure with preserved ejection fraction   2. Paroxysmal atrial fibrillation   3. CAD in native artery   4. Current every day smoker   5. Morbid obesity   6. Essential hypertension   7. Mixed hyperlipidemia     Echo 4/2021 - Interpretation Summary  · The left ventricle is normal in size with concentric remodeling and normal systolic function.  · The estimated ejection fraction is 60%.  · Normal left ventricular diastolic function.  · Normal right ventricular size with normal right ventricular systolic function.  · Mild left atrial enlargement.  · Mild mitral regurgitation.    Lexiscan - Interpretation Summary  ·  Abnormal myocardial perfusion scan.  ·  There is a moderate to severe intensity, moderate to large sized, mostly fixed with some reversibilty defect in the basal to distal inferior wall(s).  ·  There is a second small sized, mild to moderate intensity,  in the apex and anterobasilar wall(s).  ·  The gated perfusion images showed an ejection fraction of 44% post stress. Normal ejection fraction is greater than 53%.  ·  The " EKG portion of this study is negative for ischemia.  ·  The patient reported no chest pain during the stress test.    In 2/2024, return for follow up post DCC, positive for REED and awaiting CPAP titration. Also with high ASCVD 10-year event risk of over 27% not on statin, baseline LDL-C 157. Denies any CV symptoms. Troubled by chronic pains.     In 5/2024, return for review. Started CPAP but problem with dryness and high pressure. Still with occasional AF but still heavy on the alcohol.     Abdominal US - Enlarged liver with fatty infiltration.  Borderline size of the spleen.  No ascites.     Minimally dilated common bile duct, correlate with laboratory values.  Follow-up MRCP can be performed if indicated.    In 8/2024, return for review, post THR with no complications. Back to smoking about 5 cpd.     HPI comments: in 3/2025, return for review. No heart issues, still some CP, achy ness with also jaw discomfort while just sitting there. No other associated symptoms, last up to 10 minutes, max intensity 3/10. Still smoking.     Review of Systems   Constitutional: Positive for malaise/fatigue and weight gain (up about 4 lbs from 5/2024, additional 8 lbs from 8/2024.). Negative for diaphoresis, fever, night sweats and weight loss.   HENT:  Negative for hearing loss, nosebleeds and tinnitus.    Eyes:  Negative for discharge and visual disturbance.   Cardiovascular:  Positive for chest pain and dyspnea on exertion. Negative for claudication, cyanosis, irregular heartbeat, leg swelling, near-syncope, orthopnea, palpitations and paroxysmal nocturnal dyspnea.   Respiratory:  Positive for snoring and wheezing. Negative for cough, shortness of breath and sleep disturbances due to breathing.         Mansfield score 6 today a 2, awaken tired, have positive sleep apnea, not using CPAP. Bothered by the pressure.    Endocrine: Negative for polydipsia and polyuria.   Hematologic/Lymphatic: Bruises/bleeds easily.   Skin:  Positive  "for itching. Negative for color change, flushing, nail changes, poor wound healing and suspicious lesions.   Musculoskeletal:  Positive for back pain, joint pain and joint swelling. Negative for arthritis, falls, gout, muscle cramps, muscle weakness, myalgias and neck pain.   Gastrointestinal:  Positive for diarrhea and nausea. Negative for constipation, heartburn, hematemesis, hematochezia, melena and vomiting.   Genitourinary:  Positive for urgency. Negative for hematuria.   Neurological:  Negative for disturbances in coordination, excessive daytime sleepiness, dizziness, focal weakness, headaches, light-headedness, loss of balance, numbness, vertigo and weakness.   Psychiatric/Behavioral:  Negative for depression and substance abuse. The patient does not have insomnia and is not nervous/anxious.      Answers submitted by the patient for this visit:  Review of Systems Questionnaire (Submitted on 2/27/2025)  activity change: No  unexpected weight change: No  rhinorrhea: No  trouble swallowing: No  chest tightness: No  blood in stool: No  difficulty urinating: No  arthralgias: Yes  confusion: No  dysphoric mood: No     Objective:    Physical Exam  Constitutional:       Appearance: He is well-developed.      Comments: RA O2 sat 97%  Orthostatic VS: sitting 117/69, standing 131/76   HENT:      Head: Normocephalic.   Eyes:      Conjunctiva/sclera: Conjunctivae normal.      Pupils: Pupils are equal, round, and reactive to light.   Neck:      Thyroid: No thyromegaly.      Vascular: No JVD.      Comments: Circumference 17.5"  Cardiovascular:      Rate and Rhythm: Normal rate. Rhythm irregularly irregular.      Pulses: Intact distal pulses.           Carotid pulses are 1+ on the right side and 1+ on the left side.       Radial pulses are 1+ on the right side and 1+ on the left side.        Dorsalis pedis pulses are 1+ on the right side and 1+ on the left side.        Posterior tibial pulses are 1+ on the right side and 1+ " "on the left side.      Heart sounds: Heart sounds are distant. No murmur heard.     No friction rub. No gallop.      Comments: Spider veins bilaterally  Pulmonary:      Effort: Pulmonary effort is normal.      Breath sounds: Normal breath sounds. No rales.   Chest:      Chest wall: No tenderness.   Abdominal:      General: Bowel sounds are normal.      Palpations: Abdomen is soft.      Tenderness: There is no abdominal tenderness.      Comments: Waist 48" down to 47"   Musculoskeletal:         General: Normal range of motion.      Cervical back: Normal range of motion and neck supple.      Right lower leg: No edema.      Left lower leg: No edema.   Lymphadenopathy:      Cervical: No cervical adenopathy.   Skin:     General: Skin is warm and dry.      Findings: No rash.   Neurological:      Mental Status: He is alert and oriented to person, place, and time.           Assessment:       1. Precordial chest pain    2. Paroxysmal atrial fibrillation    3. Primary hypertension    4. Abnormal nuclear stress test    5. Excessive drinking alcohol    6. Fatty liver    7. Uncomplicated opioid dependence, oxycodone 2-3 times daily for over 2 years    8. Chronic heart failure with preserved ejection fraction    9. Class 2 severe obesity due to excess calories with serious comorbidity and body mass index (BMI) of 35.0 to 35.9 in adult    10. Current use of long term anticoagulation    11. Mixed hyperlipidemia    12. REED (obstructive sleep apnea)    13. Tetrahydrocannabinol (THC) use disorder, mild, abuse, gummies, one a day since 2022    14. Tobacco use disorder         Plan:       Diagnoses and all orders for this visit:    Precordial chest pain  -     Stress Echo Which stress agent will be used? Pharmacological; Color Flow Doppler? Yes; Future    Paroxysmal atrial fibrillation  -     IN OFFICE EKG 12-LEAD (to Muse)  -     Stress Echo Which stress agent will be used? Pharmacological; Color Flow Doppler? Yes; Future    Primary " "hypertension  -     IN OFFICE EKG 12-LEAD (to Muse)  -     Hypertension Digital Medicine (HDMP) Enrollment Order    Abnormal nuclear stress test  -     IN OFFICE EKG 12-LEAD (to Muse)  -     Stress Echo Which stress agent will be used? Pharmacological; Color Flow Doppler? Yes; Future    Excessive drinking alcohol    Fatty liver    Uncomplicated opioid dependence, oxycodone 2-3 times daily for over 2 years    Chronic heart failure with preserved ejection fraction    Class 2 severe obesity due to excess calories with serious comorbidity and body mass index (BMI) of 35.0 to 35.9 in adult    Current use of long term anticoagulation    Mixed hyperlipidemia    REED (obstructive sleep apnea)    Tetrahydrocannabinol (THC) use disorder, mild, abuse, gummies, one a day since 2022    Tobacco use disorder     - All medical issues reviewed, declined smoking cessation. Willing to proceed with [employer] for HTN  - Warning signs of MI and stroke given, if symptoms last more than 5 minutes, stop immediately and call 911, then chew 2-4 low-dose ASA (81 mg).   - CBT, cognitive behavior therapy for sleep disorder.   - Refer to "Chronic Pain" article in Consumer Report 6/2019 issue.   - Need to work on CPAP  - Need annual fundoscopic examination   - CV status and all medications reviewed, patient acknowledge good understanding.  - Recommend healthy living: no nicotine, moderate alcohol, healthy diet and regular exercise aiming for fitness, restorative sleep and weight control  - Discussed healthy daily limit of 1 oz of pure alcohol in any 24 hours (roughly 2 12-oz beers, 10 oz of wine (8%-12% alcohol), or 1.5 oz of liquor (80 proof)), can not save up.   - Need good exercise program, 4 key elements: 1. Aerobic (walking, swimming, dancing, jogging, biking, etc, 2. Muscle strengthening / resistance exercise, need to do 2-3 times weekly, 3. Stretching daily, good stretch takes a whole  total minute. 4. Balance exercise daily. "   - Encourage activities as much as tolerated. Any activity is better than none!   - Instruction for Mediterranean diet and heart healthy exercise given.  - Weigh twice weekly, try to lose 1-2 lbs per week. Target weight loss of 5%-10%.  - Highly recommend 30-60 minutes of exercise / activities daily, can have Sunday off, with 2-3 sessions of muscle strengthening weekly. A  would be very helpful.  - Recommend at least biannual cardiovascular evaluation in view of patient's significant risk factors. Patient's preference.      Total time spend including review of record prior to face-to-face visit is 40 minutes. Greater than 50% of the time was spent in counseling and coordination of care. The above assessment and plan have been discussed at length. Referring provider's note reviewed. Labs and procedure over the last 6 months reviewed. Problem List updated. Asked to bring in all active medications / pills bottles with next visit. Will send note to referring / PCP.

## 2025-03-20 ENCOUNTER — PATIENT MESSAGE (OUTPATIENT)
Dept: CARDIOLOGY | Facility: CLINIC | Age: 70
End: 2025-03-20
Payer: MEDICARE

## 2025-03-20 DIAGNOSIS — Z91.89 AT RISK FOR CARDIOVASCULAR EVENT: ICD-10-CM

## 2025-03-20 DIAGNOSIS — E78.00 HYPERCHOLESTEROLEMIA: ICD-10-CM

## 2025-03-20 RX ORDER — ROSUVASTATIN CALCIUM 20 MG/1
20 TABLET, COATED ORAL DAILY
Qty: 90 TABLET | Refills: 3 | Status: SHIPPED | OUTPATIENT
Start: 2025-03-20 | End: 2026-03-20

## 2025-03-24 DIAGNOSIS — Z79.01 CURRENT USE OF LONG TERM ANTICOAGULATION: ICD-10-CM

## 2025-03-24 DIAGNOSIS — I48.0 PAROXYSMAL ATRIAL FIBRILLATION: ICD-10-CM

## 2025-03-25 ENCOUNTER — OFFICE VISIT (OUTPATIENT)
Dept: PODIATRY | Facility: CLINIC | Age: 70
End: 2025-03-25
Payer: MEDICARE

## 2025-03-25 VITALS — WEIGHT: 272 LBS | HEIGHT: 73 IN | BODY MASS INDEX: 36.05 KG/M2

## 2025-03-25 DIAGNOSIS — M79.671 RIGHT FOOT PAIN: ICD-10-CM

## 2025-03-25 DIAGNOSIS — M72.2 PLANTAR FASCIITIS: Primary | ICD-10-CM

## 2025-03-25 PROCEDURE — 99499 UNLISTED E&M SERVICE: CPT | Mod: S$GLB,,, | Performed by: PODIATRIST

## 2025-03-25 PROCEDURE — 20550 NJX 1 TENDON SHEATH/LIGAMENT: CPT | Mod: RT,S$GLB,, | Performed by: PODIATRIST

## 2025-03-25 RX ADMIN — DEXAMETHASONE SODIUM PHOSPHATE 4 MG: 4 INJECTION, SOLUTION INTRA-ARTICULAR; INTRALESIONAL; INTRAMUSCULAR; INTRAVENOUS; SOFT TISSUE at 02:03

## 2025-03-25 RX ADMIN — LIDOCAINE HYDROCHLORIDE 1 ML: 10 INJECTION, SOLUTION INFILTRATION; PERINEURAL at 02:03

## 2025-03-27 ENCOUNTER — OFFICE VISIT (OUTPATIENT)
Dept: FAMILY MEDICINE | Facility: CLINIC | Age: 70
End: 2025-03-27
Payer: MEDICARE

## 2025-03-27 VITALS
DIASTOLIC BLOOD PRESSURE: 60 MMHG | WEIGHT: 270 LBS | HEIGHT: 73 IN | OXYGEN SATURATION: 95 % | HEART RATE: 74 BPM | BODY MASS INDEX: 35.78 KG/M2 | RESPIRATION RATE: 20 BRPM | SYSTOLIC BLOOD PRESSURE: 120 MMHG

## 2025-03-27 DIAGNOSIS — E78.2 MIXED HYPERLIPIDEMIA: ICD-10-CM

## 2025-03-27 DIAGNOSIS — R79.9 ABNORMAL FINDING OF BLOOD CHEMISTRY, UNSPECIFIED: ICD-10-CM

## 2025-03-27 DIAGNOSIS — I48.0 PAROXYSMAL ATRIAL FIBRILLATION: ICD-10-CM

## 2025-03-27 DIAGNOSIS — K76.0 FATTY LIVER: Primary | ICD-10-CM

## 2025-03-27 DIAGNOSIS — I50.32 CHRONIC HEART FAILURE WITH PRESERVED EJECTION FRACTION: ICD-10-CM

## 2025-03-27 PROCEDURE — 84443 ASSAY THYROID STIM HORMONE: CPT | Performed by: STUDENT IN AN ORGANIZED HEALTH CARE EDUCATION/TRAINING PROGRAM

## 2025-03-27 PROCEDURE — 85025 COMPLETE CBC W/AUTO DIFF WBC: CPT | Performed by: STUDENT IN AN ORGANIZED HEALTH CARE EDUCATION/TRAINING PROGRAM

## 2025-03-27 PROCEDURE — 3008F BODY MASS INDEX DOCD: CPT | Mod: CPTII,S$GLB,, | Performed by: STUDENT IN AN ORGANIZED HEALTH CARE EDUCATION/TRAINING PROGRAM

## 2025-03-27 PROCEDURE — 3078F DIAST BP <80 MM HG: CPT | Mod: CPTII,S$GLB,, | Performed by: STUDENT IN AN ORGANIZED HEALTH CARE EDUCATION/TRAINING PROGRAM

## 2025-03-27 PROCEDURE — 83036 HEMOGLOBIN GLYCOSYLATED A1C: CPT | Performed by: STUDENT IN AN ORGANIZED HEALTH CARE EDUCATION/TRAINING PROGRAM

## 2025-03-27 PROCEDURE — 1159F MED LIST DOCD IN RCRD: CPT | Mod: CPTII,S$GLB,, | Performed by: STUDENT IN AN ORGANIZED HEALTH CARE EDUCATION/TRAINING PROGRAM

## 2025-03-27 PROCEDURE — 82465 ASSAY BLD/SERUM CHOLESTEROL: CPT | Performed by: STUDENT IN AN ORGANIZED HEALTH CARE EDUCATION/TRAINING PROGRAM

## 2025-03-27 PROCEDURE — 80053 COMPREHEN METABOLIC PANEL: CPT | Performed by: STUDENT IN AN ORGANIZED HEALTH CARE EDUCATION/TRAINING PROGRAM

## 2025-03-27 PROCEDURE — 99214 OFFICE O/P EST MOD 30 MIN: CPT | Mod: S$GLB,,, | Performed by: STUDENT IN AN ORGANIZED HEALTH CARE EDUCATION/TRAINING PROGRAM

## 2025-03-27 PROCEDURE — G2211 COMPLEX E/M VISIT ADD ON: HCPCS | Mod: S$GLB,,, | Performed by: STUDENT IN AN ORGANIZED HEALTH CARE EDUCATION/TRAINING PROGRAM

## 2025-03-27 PROCEDURE — 3074F SYST BP LT 130 MM HG: CPT | Mod: CPTII,S$GLB,, | Performed by: STUDENT IN AN ORGANIZED HEALTH CARE EDUCATION/TRAINING PROGRAM

## 2025-03-27 PROCEDURE — 4010F ACE/ARB THERAPY RXD/TAKEN: CPT | Mod: CPTII,S$GLB,, | Performed by: STUDENT IN AN ORGANIZED HEALTH CARE EDUCATION/TRAINING PROGRAM

## 2025-03-27 PROCEDURE — 1125F AMNT PAIN NOTED PAIN PRSNT: CPT | Mod: CPTII,S$GLB,, | Performed by: STUDENT IN AN ORGANIZED HEALTH CARE EDUCATION/TRAINING PROGRAM

## 2025-03-27 NOTE — PROGRESS NOTES
"  [facility]    [facility]  [address]  [place] [address]    Subjective     Patient ID: Gregorio Guerra is a 69 y.o. male who comes to the clinic for a follow up visit.    Chief Complaint: managment of chronic condition (2 mos follow up)    Afib - sees Dr. He, is s/p DC cardioversion in Dec 2023, is doing great now, in sinus rhythm. 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 Takes lasix 20mg daily, also on losartan, metoprolol succinate, and aldactone. Has never been hospitalized for HF exacerbation     Chronic back pain - on percocet 10mg TID, written by Dr. Billings. Also on gabapentin for neuropathy     HLD - ASCVD risk at 27%, takes crestor 20mg daily     Thrombocytopenia - platelets in the 110-130 range, also has macrocytosis. Liver u/s in March 2024 showed hepatomegaly w/ fatty infiltration but signs of cirrhosis    Fatty Liver - see thrombocytopenia above     Left hip pain - s/p replacement w/ Dr. Aguayo    ROS negative unless stated above       Objective     Vitals:    03/27/25 1524   BP: 120/60   BP Location: Right arm   Patient Position: Sitting   Pulse: 74   Resp: 20   SpO2: 95%   Weight: 122.5 kg (270 lb)   Height: 6' 1" (1.854 m)        Wt Readings from Last 3 Encounters:   03/27/25 1524 122.5 kg (270 lb)   03/25/25 1441 123.4 kg (272 lb)   03/06/25 1503 122.3 kg (269 lb 11.2 oz)        Physical Exam  Constitutional:       General: He is not in acute distress.     Appearance: Normal appearance. He is obese. He is not ill-appearing.   HENT:      Head: Normocephalic and atraumatic.      Mouth/Throat:      Mouth: Mucous membranes are moist.   Eyes:      Extraocular Movements: Extraocular movements intact.      Pupils: Pupils are equal, round, and reactive to light.   Cardiovascular:      Rate and Rhythm: Normal rate.   Pulmonary:      Effort: Pulmonary effort is normal. No " respiratory distress.   Musculoskeletal:         General: Normal range of motion.      Cervical back: Normal range of motion and neck supple.   Skin:     General: Skin is warm and dry.   Neurological:      General: No focal deficit present.      Mental Status: He is alert and oriented to person, place, and time. Mental status is at baseline.   Psychiatric:         Mood and Affect: Mood normal.         Behavior: Behavior normal.         Thought Content: Thought content normal.         Current Outpatient Medications   Medication Instructions    albuterol (VENTOLIN HFA) 90 mcg/actuation inhaler 2 puffs, Inhalation, Every 6 hours PRN, Rescue    apixaban (ELIQUIS) 5 mg, Oral, 2 times daily    colchicine (COLCRYS) 0.6 mg tablet Take 2 tablets at first sign of flare, then 0.6mg one hour after. On subsequent days, take 0.6mg daily until flare resolves    furosemide (LASIX) 20 mg, Oral, Daily    gabapentin (NEURONTIN) 300 MG capsule No dose, route, or frequency recorded.    losartan (COZAAR) 25 mg, Oral, Daily    metoprolol succinate (TOPROL-XL) 50 mg, Oral, Daily    ondansetron (ZOFRAN) 4 mg, Every 8 hours PRN    oxyCODONE-acetaminophen (PERCOCET)  mg per tablet 1 tablet    rosuvastatin (CRESTOR) 20 mg, Oral, Daily    sildenafiL (VIAGRA) 50 MG tablet TAKE 1 TABLET BY MOUTH AS NEEDED FOR ERECTILE DYSFUNCTION    spironolactone (ALDACTONE) 25 mg, Oral, Daily    WEGOVY 0.25 mg, Subcutaneous, Weekly           Assessment and Plan     1. Fatty liver  -     CBC auto differential; Future; Expected date: 03/27/2025  -     Hemoglobin A1c; Future; Expected date: 03/27/2025    2. Mixed hyperlipidemia  -     Lipid panel; Future; Expected date: 03/27/2025    3. Chronic heart failure with preserved ejection fraction  -     Comprehensive Metabolic Panel; Future    4. Paroxysmal atrial fibrillation  -     TSH; Future; Expected date: 03/27/2025    5. Abnormal finding of blood chemistry, unspecified  -     Hemoglobin A1c; Future;  Expected date: 03/27/2025        Here for follow up.    For uncomplicated opioid dependence, stable, continue Percocet and follow up with pain management     For obesity, stable, continue we Wegovy     For LVH and heart failure, continue follow up with Cardiology, stable     For AFib, regular rhythm today, continue medications and follow up with Cardiology     For hypertension, at goal, continue regimen    The visit today included increased complexity associated with the care of an episodic problem, namely HTN, that was addressed and managed via longitudinal care.    RTC in 6 months        I encouraged the patient to take all medications as prescribed and to keep follow up appointments with their providers. ED precautions given for more concerning issues. Questions were invited and answered. Patient stated they had no other concerns. Follow up sooner if needed.     Girish Roberts MD  03/27/2025 3:29 PM

## 2025-03-31 ENCOUNTER — RESULTS FOLLOW-UP (OUTPATIENT)
Dept: FAMILY MEDICINE | Facility: CLINIC | Age: 70
End: 2025-03-31

## 2025-03-31 DIAGNOSIS — K76.0 FATTY LIVER: Primary | ICD-10-CM

## 2025-04-01 ENCOUNTER — HOSPITAL ENCOUNTER (OUTPATIENT)
Facility: HOSPITAL | Age: 70
Discharge: HOME | End: 2025-04-01
Attending: ANESTHESIOLOGY | Admitting: ANESTHESIOLOGY
Payer: MEDICARE

## 2025-04-01 VITALS
HEIGHT: 73 IN | WEIGHT: 270 LBS | TEMPERATURE: 97 F | SYSTOLIC BLOOD PRESSURE: 114 MMHG | OXYGEN SATURATION: 95 % | RESPIRATION RATE: 15 BRPM | DIASTOLIC BLOOD PRESSURE: 64 MMHG | BODY MASS INDEX: 35.78 KG/M2 | HEART RATE: 69 BPM

## 2025-04-01 DIAGNOSIS — M54.16 LUMBAR RADICULITIS: Primary | ICD-10-CM

## 2025-04-01 PROCEDURE — 64483 NJX AA&/STRD TFRM EPI L/S 1: CPT | Mod: LT | Performed by: ANESTHESIOLOGY

## 2025-04-01 PROCEDURE — 99152 MOD SED SAME PHYS/QHP 5/>YRS: CPT | Performed by: ANESTHESIOLOGY

## 2025-04-01 PROCEDURE — 25500020 PHARM REV CODE 255: Performed by: ANESTHESIOLOGY

## 2025-04-01 PROCEDURE — 99153 MOD SED SAME PHYS/QHP EA: CPT | Performed by: ANESTHESIOLOGY

## 2025-04-01 PROCEDURE — 64484 NJX AA&/STRD TFRM EPI L/S EA: CPT | Mod: LT | Performed by: ANESTHESIOLOGY

## 2025-04-01 PROCEDURE — 63600175 PHARM REV CODE 636 W HCPCS: Performed by: ANESTHESIOLOGY

## 2025-04-01 RX ORDER — DEXAMETHASONE SODIUM PHOSPHATE 4 MG/ML
INJECTION, SOLUTION INTRA-ARTICULAR; INTRALESIONAL; INTRAMUSCULAR; INTRAVENOUS; SOFT TISSUE
Status: DISCONTINUED | OUTPATIENT
Start: 2025-04-01 | End: 2025-04-01 | Stop reason: HOSPADM

## 2025-04-01 RX ORDER — FENTANYL CITRATE 50 UG/ML
INJECTION, SOLUTION INTRAMUSCULAR; INTRAVENOUS
Status: DISCONTINUED | OUTPATIENT
Start: 2025-04-01 | End: 2025-04-01 | Stop reason: HOSPADM

## 2025-04-01 RX ORDER — LIDOCAINE HYDROCHLORIDE 10 MG/ML
INJECTION, SOLUTION INFILTRATION; PERINEURAL
Status: DISCONTINUED | OUTPATIENT
Start: 2025-04-01 | End: 2025-04-01 | Stop reason: HOSPADM

## 2025-04-01 RX ORDER — BUPIVACAINE HYDROCHLORIDE 2.5 MG/ML
INJECTION, SOLUTION EPIDURAL; INFILTRATION; INTRACAUDAL; PERINEURAL
Status: DISCONTINUED | OUTPATIENT
Start: 2025-04-01 | End: 2025-04-01 | Stop reason: HOSPADM

## 2025-04-01 RX ORDER — MIDAZOLAM HYDROCHLORIDE 5 MG/ML
INJECTION INTRAMUSCULAR; INTRAVENOUS
Status: DISCONTINUED | OUTPATIENT
Start: 2025-04-01 | End: 2025-04-01 | Stop reason: HOSPADM

## 2025-04-01 RX ORDER — SODIUM CHLORIDE, SODIUM LACTATE, POTASSIUM CHLORIDE, CALCIUM CHLORIDE 600; 310; 30; 20 MG/100ML; MG/100ML; MG/100ML; MG/100ML
INJECTION, SOLUTION INTRAVENOUS CONTINUOUS
Status: DISCONTINUED | OUTPATIENT
Start: 2025-04-01 | End: 2025-04-01 | Stop reason: HOSPADM

## 2025-04-01 NOTE — DISCHARGE SUMMARY
[facility]  Discharge Note  Short Stay    Procedure(s) (LRB):  LUMBAR MYRNA TRANSFORAMINAL LEFT L2-L3, L3-L4 (Left)  INJECTION, SPINE, LUMBOSACRAL, TRANSFORAMINAL APPROACH, EACH ADD'L LEVEL (Left)      OUTCOME: Patient tolerated treatment/procedure well without complication and is now ready for discharge.    DISPOSITION: Home or Self Care    FINAL DIAGNOSIS: Lumbar radiculitis     FOLLOWUP: In clinic    DISCHARGE INSTRUCTIONS:    Discharge Procedure Orders   Diet general     Call MD for:  temperature >100.4     Call MD for:  persistent nausea and vomiting     Call MD for:  severe uncontrolled pain     Call MD for:  difficulty breathing, headache or visual disturbances     Call MD for:  redness, tenderness, or signs of infection (pain, swelling, redness, odor or green/yellow discharge around incision site)     Call MD for:  hives     Call MD for:  persistent dizziness or light-headedness     Call MD for:  extreme fatigue        TIME SPENT ON DISCHARGE: 30 minutes

## 2025-04-01 NOTE — DISCHARGE INSTRUCTIONS
Dr. Billings [phone] extension [phone]  [facility] [phone]  [facility] [phone]      You may remove the dressing tomorrow.  You may shower after the dressing is removed.  Do not submerge in any water for the next 48 hours. (Bath, swimming, hot tub etc.)

## 2025-04-01 NOTE — H&P
"FOLLOW UP NOTE:     CHIEF COMPLAINT: back and leg pain    INTERVAL HISTORY OF PRESENT ILLNESS: Gregorio Guerra is a 69 y.o. male who presents for LUMBAR MYRNA TRANSFORAMINAL LEFT L2-L3, L3-L4 . The patient denies of any significant changes in his health since his last appointment. The patient also denies of any changes in the character of his pain since his last appointment.     ROS:  Review of Systems   Constitutional:  Negative for chills and fever.   HENT:  Negative for tinnitus.    Eyes:  Negative for visual disturbance.   Respiratory:  Negative for shortness of breath.    Cardiovascular:  Negative for chest pain.   Gastrointestinal:  Negative for nausea and vomiting.   Genitourinary:  Negative for difficulty urinating.   Musculoskeletal:  Positive for arthralgias and back pain.   Skin:  Negative for rash.   Allergic/Immunologic: Negative for immunocompromised state.   Neurological:  Negative for syncope.   Hematological:  Does not bruise/bleed easily.   Psychiatric/Behavioral:  Negative for suicidal ideas.         MEDICAL, SURGICAL, FAMILY, SOCIAL HX: reviewed    MEDICATIONS/ALLERGIES: reviewed    PHYSICAL EXAM:    VITALS: Vitals reviewed.   Vitals:    04/01/25 0711   BP: 133/64   Pulse: 71   Resp: 12   Temp: 97.9 °F (36.6 °C)   TempSrc: Temporal   SpO2: 96%   Weight: 122.5 kg (270 lb)   Height: 6' 1" (1.854 m)       Physical Exam  Vitals and nursing note reviewed.   Constitutional:       Appearance: Normal appearance. He is not toxic-appearing or diaphoretic.   HENT:      Head: Normocephalic and atraumatic.   Eyes:      General:         Right eye: No discharge.         Left eye: No discharge.      Extraocular Movements: Extraocular movements intact.      Conjunctiva/sclera: Conjunctivae normal.   Cardiovascular:      Rate and Rhythm: Normal rate.   Pulmonary:      Effort: Pulmonary effort is normal. No respiratory distress.      Breath sounds: Normal breath sounds.   Abdominal:      Palpations: Abdomen is " soft.   Skin:     General: Skin is warm and dry.      Findings: No rash.   Neurological:      Mental Status: He is alert and oriented to person, place, and time.   Psychiatric:         Mood and Affect: Mood and affect normal.         Cognition and Memory: Memory normal.         Judgment: Judgment normal.          EXTREMITIES:    Gen: No cyanosis, edema, varicosities, or tenderness to palpation BLE   Skin: Warm, pink, dry, no rashes, no lesions BLE   Strength: 5/5 motor strength BLE   ROM: hips, knees and ankles without pain or instability.     NEUROLOGICAL:    Gen: No clonus or spasticity.   Gait: Normal without antalgic lean   DTR's: 2+ in bilateral patellar, and ankle   BABINSKI: Absent bilaterally  Sensory: Intact to light touch and proprioception BLE    ASSESSMENT: Gregorio Guerra is a 69 y.o. male who presents for LUMBAR MYRNA TRANSFORAMINAL LEFT L2-L3, L3-L4 .     PLAN:  Proceed with LUMBAR MYRNA TRANSFORAMINAL LEFT L2-L3, L3-L4  as previously discussed.    This patient has been cleared for surgery in an ambulatory surgical facility    ASA 3,  Mallampatti Score 3  No history of anesthetic complications  Plan for RN IV sedation    Lita Billings MD  Pain Management

## 2025-04-01 NOTE — OP NOTE
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     LOCAL ANESTHETIC INJECTED:  Lidocaine 1%. 4 ml per site.    SEDATION MEDICATIONS: RN IV sedation    ESTIMATED BLOOD LOSS:  none    COMPLICATIONS:  none    TECHNIQUE:   A time-out was taken to identify patient and procedure side prior to starting the procedure. The patient was placed in a prone position, prepped and draped in the usual sterile fashion using ChloraPrep and sterile towels.  The area to be injected was determined under fluoroscopic guidance in AP and oblique view.  Local anesthetic was given by raising a wheal and going down to the hub of a 25-gauge 1.5 inch needle.  In oblique view, a 5 inch 22-gauge bent-tip spinal needle was introduced towards 6 oclock position of the pedicle of each above named nerve root level.  The needle was walked medially then hinged into the neural foramen and position was confirmed in AP and lateral views.  4 mL contrast dye was injected to confirm appropriate placement and that there was no vascular uptake.  After negative aspiration for blood or CSF, the medication was then injected. This was performed at the L2-L3, L3-L4 level(s). The patient tolerated the procedure well.    The patient was monitored after the procedure.  Patient was given post procedure and discharge instructions to follow at home. The patient was discharged in a stable condition.

## 2025-04-14 RX ORDER — DEXAMETHASONE SODIUM PHOSPHATE 4 MG/ML
4 INJECTION, SOLUTION INTRA-ARTICULAR; INTRALESIONAL; INTRAMUSCULAR; INTRAVENOUS; SOFT TISSUE
Status: DISCONTINUED | OUTPATIENT
Start: 2025-03-25 | End: 2025-04-14 | Stop reason: HOSPADM

## 2025-04-14 RX ORDER — LIDOCAINE HYDROCHLORIDE 10 MG/ML
1 INJECTION, SOLUTION INFILTRATION; PERINEURAL
Status: DISCONTINUED | OUTPATIENT
Start: 2025-03-25 | End: 2025-04-14 | Stop reason: HOSPADM

## 2025-04-14 NOTE — PROGRESS NOTES
Subjective:     Patient ID: Gregorio Guerra is a 69 y.o. male    Chief Complaint: Foot Pain       Gregorio is a 69 y.o. male who presents to the clinic complaining of heel pain in the right foot, especially with the first step in the morning. The pain is described as Tight, Sharp, and Shooting. The onset of the pain was gradual and has worsened over the past several days. Gregorio rates the pain as 7/10. He denies a history of trauma. Prior treatments include local corticosteroid injection which was temporarily effective    Review of Systems   Constitutional: Negative.  Negative for chills and fever.   Respiratory:  Negative for cough and shortness of breath.    Cardiovascular:  Negative for chest pain and leg swelling.   Gastrointestinal:  Negative for diarrhea, nausea and vomiting.   Neurological:  Negative for tingling.        Objective:   Physical Exam  Vitals reviewed.   Constitutional:       General: He is not in acute distress.     Appearance: Normal appearance. He is not ill-appearing.   HENT:      Head: Normocephalic.      Nose: Nose normal.   Cardiovascular:      Pulses:           Dorsalis pedis pulses are 2+ on the right side and 2+ on the left side.        Posterior tibial pulses are 2+ on the right side and 2+ on the left side.   Pulmonary:      Effort: Pulmonary effort is normal. No respiratory distress.   Skin:     Capillary Refill: Capillary refill takes 2 to 3 seconds.   Neurological:      Mental Status: He is alert and oriented to person, place, and time.   Psychiatric:         Mood and Affect: Mood normal.         Behavior: Behavior normal.         Thought Content: Thought content normal.        Foot Exam    General  General Appearance: appears stated age and healthy   Orientation: alert and oriented to person, place, and time   Affect: appropriate   Gait: antalgic       Right Foot/Ankle     Inspection and Palpation  Tenderness: plantar fascia   Swelling: plantar fascia   Skin Exam: skin intact;      Neurovascular  Dorsalis pedis: 2+  Posterior tibial: 2+    Muscle Strength  Ankle dorsiflexion: 5  Ankle plantar flexion: 5  Ankle inversion: 5  Ankle eversion: 5  Great toe extension: 5  Great toe flexion: 5      Left Foot/Ankle      Inspection and Palpation  Skin Exam: skin intact;     Neurovascular  Dorsalis pedis: 2+  Posterior tibial: 2+    Muscle Strength  Ankle dorsiflexion: 5  Ankle plantar flexion: 5  Ankle inversion: 5  Ankle eversion: 5  Great toe extension: 5  Great toe flexion: 5           Assessment:         1. Plantar fasciitis    2. Right foot pain       Plan:     Gregorio Guerra was seen today for   Chief Complaint   Patient presents with    Foot Pain       Assessment & Plan           Tendon Sheath: R plantar fascia    Date/Time: 3/25/2025 2:30 PM    Performed by: Casey Romano DPM  Authorized by: Casey Romano DPM    Consent Done?:  Yes (Verbal)  Indications:  Pain  Location:  Foot  Site:  R plantar fascia  Ultrasonic guidance for needle placement?: No    Needle size:  25 G  Approach:  Medial  Medications:  1 mL LIDOcaine HCL 10 mg/ml (1%) 10 mg/mL (1 %); 4 mg dexAMETHasone 4 mg/mL  Patient tolerance:  Patient tolerated the procedure well with no immediate complications       1. Patient was examined and evaluated.    2. Patient made aware of continued plantar fasciitis symptoms to the right foot.  Patient will also made aware of potential relationship of lower lumbar dysfunction in symptoms to the feet.  Patient will continue with pain management and injection therapies.  Discussed with patient potential benefits of further local corticosteroid injection oral Medrol Dosepak for improvement of right foot plantar fasciitis symptoms.  Patient will continue with aggressive stretching and icing.  Patient will continue with comfortable shoe gear both inside and outside of the home.  Patient will decrease overall ambulation.    3. Patient will follow-up in 1-2 months or p.r.n. for  complaints      Radha Romano, DESTINY  [address] [address] [place] [address]  [phone]      This note was created using [employer] direct voice recognition software. Note may have occasional typographical errors that may not have been identified and edited despite initial review prior to signing.

## 2025-04-25 ENCOUNTER — PATIENT MESSAGE (OUTPATIENT)
Dept: ADMINISTRATIVE | Facility: HOSPITAL | Age: 70
End: 2025-04-25
Payer: MEDICARE

## 2025-04-29 ENCOUNTER — RESULTS FOLLOW-UP (OUTPATIENT)
Dept: FAMILY MEDICINE | Facility: CLINIC | Age: 70
End: 2025-04-29

## 2025-04-29 ENCOUNTER — HOSPITAL ENCOUNTER (OUTPATIENT)
Dept: RADIOLOGY | Facility: HOSPITAL | Age: 70
Discharge: HOME | End: 2025-04-29
Attending: STUDENT IN AN ORGANIZED HEALTH CARE EDUCATION/TRAINING PROGRAM
Payer: MEDICARE

## 2025-04-29 DIAGNOSIS — K76.0 FATTY LIVER: ICD-10-CM

## 2025-04-29 PROCEDURE — 76705 ECHO EXAM OF ABDOMEN: CPT | Mod: 26,,, | Performed by: RADIOLOGY

## 2025-04-29 PROCEDURE — 76705 ECHO EXAM OF ABDOMEN: CPT | Mod: TC

## 2025-04-30 ENCOUNTER — OFFICE VISIT (OUTPATIENT)
Dept: PODIATRY | Facility: CLINIC | Age: 70
End: 2025-04-30
Payer: MEDICARE

## 2025-04-30 VITALS — WEIGHT: 268.38 LBS | HEIGHT: 73 IN | BODY MASS INDEX: 35.57 KG/M2

## 2025-04-30 DIAGNOSIS — M79.671 RIGHT FOOT PAIN: ICD-10-CM

## 2025-04-30 DIAGNOSIS — M72.2 PLANTAR FASCIITIS: Primary | ICD-10-CM

## 2025-04-30 DIAGNOSIS — R26.2 DIFFICULTY WALKING: ICD-10-CM

## 2025-04-30 PROCEDURE — 3008F BODY MASS INDEX DOCD: CPT | Mod: CPTII,S$GLB,, | Performed by: PODIATRIST

## 2025-04-30 PROCEDURE — 1159F MED LIST DOCD IN RCRD: CPT | Mod: CPTII,S$GLB,, | Performed by: PODIATRIST

## 2025-04-30 PROCEDURE — 1126F AMNT PAIN NOTED NONE PRSNT: CPT | Mod: CPTII,S$GLB,, | Performed by: PODIATRIST

## 2025-04-30 PROCEDURE — 99213 OFFICE O/P EST LOW 20 MIN: CPT | Mod: S$GLB,,, | Performed by: PODIATRIST

## 2025-04-30 PROCEDURE — 4010F ACE/ARB THERAPY RXD/TAKEN: CPT | Mod: CPTII,S$GLB,, | Performed by: PODIATRIST

## 2025-04-30 PROCEDURE — 3044F HG A1C LEVEL LT 7.0%: CPT | Mod: CPTII,S$GLB,, | Performed by: PODIATRIST

## 2025-04-30 RX ORDER — CYCLOBENZAPRINE HCL 10 MG
10 TABLET ORAL 3 TIMES DAILY
COMMUNITY
Start: 2025-04-29

## 2025-05-12 NOTE — PROGRESS NOTES
Subjective:     Patient ID: Gregorio Guerra is a 70 y.o. male    Chief Complaint: Foot Pain       Gregorio is a 70 y.o. male who presents to the clinic complaining of heel pain in the right foot, especially with the first step in the morning. The pain is described as Dull and Variable. The onset of the pain was gradual and essentially resolved over the past several days. Gregorio rates the pain as 0/10. He denies a history of trauma. Prior treatments include prior local corticosteroid injection of the aggressive stretching and icing which will benefits of.    Review of Systems   Constitutional: Negative.  Negative for chills and fever.   Respiratory:  Negative for cough and shortness of breath.    Cardiovascular:  Negative for chest pain and leg swelling.   Gastrointestinal:  Negative for diarrhea, nausea and vomiting.   Neurological:  Negative for tingling.        Objective:   Physical Exam  Vitals reviewed.   Constitutional:       General: He is not in acute distress.     Appearance: Normal appearance. He is not ill-appearing.   HENT:      Head: Normocephalic.      Nose: Nose normal.   Cardiovascular:      Pulses:           Dorsalis pedis pulses are 2+ on the right side and 2+ on the left side.        Posterior tibial pulses are 1+ on the right side and 1+ on the left side.   Pulmonary:      Effort: Pulmonary effort is normal. No respiratory distress.   Skin:     Capillary Refill: Capillary refill takes 2 to 3 seconds.   Neurological:      Mental Status: He is alert and oriented to person, place, and time.   Psychiatric:         Mood and Affect: Mood normal.         Behavior: Behavior normal.         Thought Content: Thought content normal.        Foot Exam    General  General Appearance: appears stated age and healthy   Orientation: alert and oriented to person, place, and time   Affect: appropriate   Gait: unimpaired       Right Foot/Ankle     Inspection and Palpation  Tenderness: none      Neurovascular  Dorsalis pedis: 2+  Posterior tibial: 1+    Muscle Strength  Ankle dorsiflexion: 5  Ankle plantar flexion: 5  Ankle inversion: 5  Ankle eversion: 5  Great toe extension: 5  Great toe flexion: 5      Left Foot/Ankle      Inspection and Palpation  Tenderness: none     Neurovascular  Dorsalis pedis: 2+  Posterior tibial: 1+    Muscle Strength  Ankle dorsiflexion: 5  Ankle plantar flexion: 5  Ankle inversion: 5  Ankle eversion: 5  Great toe extension: 5  Great toe flexion: 5      Musculoskeletal: Improved tenderness and range of motion bilateral plantar fascia, no pain and tenderness with palpation right calcaneal medial tubercle of origin right plantar fascia   Neurologic: Positive paresthesias reported bilateral foot  Assessment:         1. Plantar fasciitis    2. Right foot pain    3. Difficulty walking       Plan:     Gregorio Guerra was seen today for   Chief Complaint   Patient presents with    Foot Pain       Assessment & Plan           Procedures     1. Patient was examined and evaluated.    2. Again discussed with patient plantar fasciitis.  Patient was advised to continue with current stretching and icing regimen.  Patient made aware of noted improvement of symptoms with prior local corticosteroid injection.  Patient continue to monitor for return of symptoms.  Patient will adjunct with OTC NSAIDs/analgesics for pain relief if necessary.  Patient will continue with current comfortable shoe gear.    3. Patient will follow-up p.r.n. for complaints      Radha Romano DPM  [id number] [address] [id number] [phone]      This note was created using [employer] direct voice recognition software. Note may have occasional typographical errors that may not have been identified and edited despite initial review prior to signing.

## 2025-08-05 ENCOUNTER — HOSPITAL ENCOUNTER (OUTPATIENT)
Facility: HOSPITAL | Age: 70
Discharge: HOME | End: 2025-08-05
Attending: ANESTHESIOLOGY | Admitting: ANESTHESIOLOGY
Payer: MEDICARE

## 2025-08-05 VITALS
DIASTOLIC BLOOD PRESSURE: 76 MMHG | WEIGHT: 270 LBS | HEIGHT: 73 IN | SYSTOLIC BLOOD PRESSURE: 128 MMHG | HEART RATE: 71 BPM | BODY MASS INDEX: 35.78 KG/M2 | OXYGEN SATURATION: 95 % | RESPIRATION RATE: 10 BRPM | TEMPERATURE: 98 F

## 2025-08-05 DIAGNOSIS — M53.3 SACROILIAC JOINT DYSFUNCTION: Primary | ICD-10-CM

## 2025-08-05 PROCEDURE — 27096 INJECT SACROILIAC JOINT: CPT | Mod: LT | Performed by: ANESTHESIOLOGY

## 2025-08-05 PROCEDURE — 25500020 PHARM REV CODE 255: Performed by: ANESTHESIOLOGY

## 2025-08-05 PROCEDURE — 63600175 PHARM REV CODE 636 W HCPCS: Performed by: ANESTHESIOLOGY

## 2025-08-05 RX ORDER — METHYLPREDNISOLONE ACETATE 80 MG/ML
INJECTION, SUSPENSION INTRA-ARTICULAR; INTRALESIONAL; INTRAMUSCULAR; SOFT TISSUE
Status: DISCONTINUED | OUTPATIENT
Start: 2025-08-05 | End: 2025-08-05 | Stop reason: HOSPADM

## 2025-08-05 RX ORDER — SODIUM CHLORIDE, SODIUM LACTATE, POTASSIUM CHLORIDE, CALCIUM CHLORIDE 600; 310; 30; 20 MG/100ML; MG/100ML; MG/100ML; MG/100ML
INJECTION, SOLUTION INTRAVENOUS CONTINUOUS
Status: DISCONTINUED | OUTPATIENT
Start: 2025-08-05 | End: 2025-08-05 | Stop reason: HOSPADM

## 2025-08-05 RX ORDER — BUPIVACAINE HYDROCHLORIDE 5 MG/ML
INJECTION, SOLUTION EPIDURAL; INTRACAUDAL; PERINEURAL
Status: DISCONTINUED | OUTPATIENT
Start: 2025-08-05 | End: 2025-08-05 | Stop reason: HOSPADM

## 2025-08-05 RX ORDER — LIDOCAINE HYDROCHLORIDE 10 MG/ML
INJECTION, SOLUTION INFILTRATION; PERINEURAL
Status: DISCONTINUED | OUTPATIENT
Start: 2025-08-05 | End: 2025-08-05 | Stop reason: HOSPADM

## 2025-08-05 RX ORDER — MIDAZOLAM HYDROCHLORIDE 5 MG/ML
INJECTION INTRAMUSCULAR; INTRAVENOUS
Status: DISCONTINUED | OUTPATIENT
Start: 2025-08-05 | End: 2025-08-05 | Stop reason: HOSPADM

## 2025-08-05 RX ORDER — FENTANYL CITRATE 50 UG/ML
INJECTION, SOLUTION INTRAMUSCULAR; INTRAVENOUS
Status: DISCONTINUED | OUTPATIENT
Start: 2025-08-05 | End: 2025-08-05 | Stop reason: HOSPADM

## 2025-08-05 NOTE — OP NOTE
Procedure Date: 8/5/2025    PROCEDURE:  Left sacroiliac joint injection utilizing fluoroscopy.    DIAGNOSIS: Left sided sacroiliitis.  Post op diagnosis: same    PHYSICIAN: Lita Billings MD    MEDICATIONS INJECTED:  Methylprednisone 80 mg and 2 ml Bupivacaine 0.5%.    LOCAL ANESTHETIC USED: Lidocaine 1%,5 ml total.     SEDATION MEDICATIONS: RN IV sedation    ESTIMATED BLOOD LOSS: none    COMPLICATIONS: none    TECHNIQUE:   Time-out taken to identify patient and procedure side prior to starting the procedure. After placing the patient in the prone position, the patient was prepped and draped in the usual sterile fashion using ChloraPrep and sterile towels.  The area was determined under fluoroscopy in the AP view.  Lidocaine was injected by raising a wheal and going down to the periosteum using a 25-gauge 1.5 inch needle.  The 3.5 inch 22-gauge spinal needle was introduced into inferior opening of the left sacroiliac joint.  Negative pressure applied to confirm no intravascular placement.  3 ml of contrast dye was injected to confirm placement and to confirm that there was no vascular uptake. The medication was then injected slowly. The patient tolerated the procedure well.    The patient was monitored after the procedure.  The patient was given post procedure and discharge instructions to follow at home. The patient was discharged in a stable condition

## 2025-08-05 NOTE — PLAN OF CARE
Discharge instructions reviewed with patient. No questions or concerns at this time. IV removed, catheter intact. Pt transported via wheelchair to [facility]. Pt holding a conversation, no noted distress.

## 2025-08-05 NOTE — H&P
"FOLLOW UP NOTE:     CHIEF COMPLAINT: back and hip pain    INTERVAL HISTORY OF PRESENT ILLNESS: Gregorio Guerra is a 70 y.o. male who presents for LEFT SI JOINT INJECTION . The patient denies of any significant changes in his health since his last appointment. The patient also denies of any changes in the character of his pain since his last appointment.     ROS:  Review of Systems   Constitutional:  Negative for chills and fever.   HENT:  Negative for tinnitus.    Eyes:  Negative for visual disturbance.   Respiratory:  Negative for shortness of breath.    Cardiovascular:  Negative for chest pain.   Gastrointestinal:  Negative for nausea and vomiting.   Genitourinary:  Negative for difficulty urinating.   Musculoskeletal:  Positive for arthralgias and back pain.   Skin:  Negative for rash.   Allergic/Immunologic: Negative for immunocompromised state.   Neurological:  Negative for syncope.   Hematological:  Does not bruise/bleed easily.   Psychiatric/Behavioral:  Negative for suicidal ideas.         MEDICAL, SURGICAL, FAMILY, SOCIAL HX: reviewed    MEDICATIONS/ALLERGIES: reviewed    PHYSICAL EXAM:    VITALS: Vitals reviewed.   Vitals:    08/05/25 0810   BP: (!) 145/69   Pulse: 69   Resp: 16   Temp: 98.2 °F (36.8 °C)   TempSrc: Temporal   SpO2: (!) 94%   Weight: 122.5 kg (270 lb)   Height: 6' 1" (1.854 m)       Physical Exam  Vitals and nursing note reviewed.   Constitutional:       Appearance: Normal appearance. He is not toxic-appearing or diaphoretic.   HENT:      Head: Normocephalic and atraumatic.   Eyes:      General:         Right eye: No discharge.         Left eye: No discharge.      Extraocular Movements: Extraocular movements intact.      Conjunctiva/sclera: Conjunctivae normal.   Cardiovascular:      Rate and Rhythm: Normal rate.   Pulmonary:      Effort: Pulmonary effort is normal. No respiratory distress.      Breath sounds: Normal breath sounds.   Abdominal:      Palpations: Abdomen is soft. "   Skin:     General: Skin is warm and dry.      Findings: No rash.   Neurological:      Mental Status: He is alert and oriented to person, place, and time.   Psychiatric:         Mood and Affect: Mood and affect normal.         Cognition and Memory: Memory normal.         Judgment: Judgment normal.          EXTREMITIES:    Gen: No cyanosis, edema, varicosities, or tenderness to palpation BLE   Skin: Warm, pink, dry, no rashes, no lesions BLE   Strength: 5/5 motor strength BLE   ROM: hips, knees and ankles without pain or instability.     NEUROLOGICAL:    Gen: No clonus or spasticity.   Gait: Normal without antalgic lean   DTR's: 2+ in bilateral patellar, and ankle   BABINSKI: Absent bilaterally  Sensory: Intact to light touch and proprioception BLE    ASSESSMENT: Gregorio Guerra is a 70 y.o. male who presents for LEFT SI JOINT INJECTION .     PLAN:  Proceed with LEFT SI JOINT INJECTION  as previously discussed.    This patient has been cleared for surgery in an ambulatory surgical facility    ASA 3,  Mallampatti Score 3  No history of anesthetic complications  Plan for RN IV sedation    Lita Billings MD  Pain Management

## 2025-08-05 NOTE — DISCHARGE INSTRUCTIONS
Dr. Billings [phone] extension [phone]  [facility] [phone]  [facility] [phone]      You may remove the dressing tomorrow.  You may shower after the dressing is removed.  Do not submerge in any water for the next 48 hours. (Bath, swimming, hot tub etc.)

## 2025-08-05 NOTE — DISCHARGE SUMMARY
[facility] Surgery  Discharge Note  Short Stay    Procedure(s) (LRB):  LEFT SI JOINT INJECTION (Left)      OUTCOME: Patient tolerated treatment/procedure well without complication and is now ready for discharge.    DISPOSITION: Home or Self Care    FINAL DIAGNOSIS: Sacroiliac joint dysfunction    FOLLOWUP: In clinic    DISCHARGE INSTRUCTIONS:    Discharge Procedure Orders   Diet general     Call MD for:  temperature >100.4     Call MD for:  persistent nausea and vomiting     Call MD for:  severe uncontrolled pain     Call MD for:  difficulty breathing, headache or visual disturbances     Call MD for:  redness, tenderness, or signs of infection (pain, swelling, redness, odor or green/yellow discharge around incision site)     Call MD for:  hives     Call MD for:  persistent dizziness or light-headedness     Call MD for:  extreme fatigue        TIME SPENT ON DISCHARGE: 30 minutes

## 2025-08-11 ENCOUNTER — OFFICE VISIT (OUTPATIENT)
Dept: PODIATRY | Facility: CLINIC | Age: 70
End: 2025-08-11
Payer: MEDICARE

## 2025-08-11 VITALS — WEIGHT: 265 LBS | HEIGHT: 73 IN | BODY MASS INDEX: 35.12 KG/M2

## 2025-08-11 DIAGNOSIS — L60.9 DISEASE OF NAIL: ICD-10-CM

## 2025-08-11 DIAGNOSIS — I73.9 PERIPHERAL VASCULAR DISEASE: ICD-10-CM

## 2025-08-11 DIAGNOSIS — G60.9 IDIOPATHIC PERIPHERAL NEUROPATHY: Primary | ICD-10-CM

## 2025-08-11 PROCEDURE — 11721 DEBRIDE NAIL 6 OR MORE: CPT | Mod: Q9,S$GLB,, | Performed by: PODIATRIST

## 2025-08-11 PROCEDURE — 99499 UNLISTED E&M SERVICE: CPT | Mod: S$GLB,,, | Performed by: PODIATRIST

## 2025-08-11 RX ORDER — METHOCARBAMOL 500 MG/1
TABLET, FILM COATED ORAL
COMMUNITY
Start: 2025-07-29

## 2025-08-12 DIAGNOSIS — R60.0 BILATERAL LOWER EXTREMITY EDEMA: ICD-10-CM

## 2025-08-12 RX ORDER — FUROSEMIDE 20 MG/1
20 TABLET ORAL DAILY
Qty: 90 TABLET | Refills: 2 | Status: SHIPPED | OUTPATIENT
Start: 2025-08-12

## (undated) DEVICE — CANNULA SUPERSOFT CO2 7FT

## (undated) DEVICE — SYR DISP LL 5CC

## (undated) DEVICE — NDL HYPODERMIC BLUNT 18G 1.5IN

## (undated) DEVICE — NDL BLUNT FILL PNK 18G 1-0.5IN

## (undated) DEVICE — SET IV PRIMARY

## (undated) DEVICE — NDL SPINAL 22GX7 SPINOCAN

## (undated) DEVICE — CANNULA RADIOPAQUE 20G CURVED

## (undated) DEVICE — SYR LUER-LOCK STERILE 3ML

## (undated) DEVICE — APPLICATOR CHLORAPREP CLR 10.5

## (undated) DEVICE — KIT NERVE BLOCK PREP BAPTIST

## (undated) DEVICE — NDL FLTR 5MCRN BLNT TIP 18GX1

## (undated) DEVICE — PAD PREPS ALCOHOL 2-PLY LARGE

## (undated) DEVICE — TRAY NERVE BLOCK

## (undated) DEVICE — STRAP OR TABLE 5IN X 72IN

## (undated) DEVICE — PAD GROUNDING DISPER ELECTRODE

## (undated) DEVICE — SYR 3CC LUER LOC

## (undated) DEVICE — PAD ALCOHOL PREP LG STRL 2PLY

## (undated) DEVICE — CHLORAPREP 10.5 ML APPLICATOR

## (undated) DEVICE — GLOVE SENSICARE PI SURG 7.5

## (undated) DEVICE — GLOVE SURG ULTRA TOUCH 7.5

## (undated) DEVICE — TOWEL OR DISP STRL BLUE 4/PK

## (undated) DEVICE — TUBING MINIBORE EXTENSION

## (undated) DEVICE — SYS LABEL CORRECT MED

## (undated) DEVICE — SYR 10CC LUER LOCK

## (undated) DEVICE — NDL SPINAL 22GA 3.5 IN QUINCKE

## (undated) DEVICE — NDL SPINAL 25GX3.5 SPINOCAN

## (undated) DEVICE — NDL BLUNT W/O FILTER 18GX1.5IN

## (undated) DEVICE — ELECTRODE RESUSCITATION 1 STEP

## (undated) DEVICE — SYR LUER-LOCK STERILE 5ML

## (undated) DEVICE — DRESSING LEUKOPLAST FLEX 1X3IN

## (undated) DEVICE — NDL SPINAL 22GX5

## (undated) DEVICE — NDL FILTER 19GA X 1-1/2

## (undated) DEVICE — SYR GLASS 5CC LUER LOK

## (undated) DEVICE — NDL SPINAL 18GX3.5 SPINOCAN

## (undated) DEVICE — NDL ECLIPSE SAFETY 18GX1-1/2IN

## (undated) DEVICE — MARKER SKIN STND TIP BLUE BARR

## (undated) DEVICE — Device

## (undated) DEVICE — NDL SAFETY 25G X 1.5 ECLIPSE

## (undated) DEVICE — NDL BLNT STD 1.5IN 18G

## (undated) DEVICE — BANDAGE CURAD ADH FABRIC 1X3IN

## (undated) DEVICE — SET IV ADMIN 15DROP 3 CARESITE

## (undated) DEVICE — BANDAGE ADHESIVE

## (undated) DEVICE — NDL TUOHY EPIDURAL 20G X 3.5